# Patient Record
Sex: FEMALE | Race: BLACK OR AFRICAN AMERICAN | NOT HISPANIC OR LATINO | Employment: FULL TIME | URBAN - METROPOLITAN AREA
[De-identification: names, ages, dates, MRNs, and addresses within clinical notes are randomized per-mention and may not be internally consistent; named-entity substitution may affect disease eponyms.]

---

## 2017-01-03 ENCOUNTER — OFFICE VISIT (OUTPATIENT)
Dept: INTERNAL MEDICINE | Facility: CLINIC | Age: 52
End: 2017-01-03
Attending: FAMILY MEDICINE
Payer: COMMERCIAL

## 2017-01-03 ENCOUNTER — TELEPHONE (OUTPATIENT)
Dept: SMOKING CESSATION | Facility: CLINIC | Age: 52
End: 2017-01-03

## 2017-01-03 VITALS
OXYGEN SATURATION: 98 % | BODY MASS INDEX: 31.89 KG/M2 | SYSTOLIC BLOOD PRESSURE: 130 MMHG | DIASTOLIC BLOOD PRESSURE: 87 MMHG | HEART RATE: 67 BPM | HEIGHT: 66 IN | WEIGHT: 198.44 LBS

## 2017-01-03 DIAGNOSIS — M54.50 CHRONIC BILATERAL LOW BACK PAIN WITHOUT SCIATICA: ICD-10-CM

## 2017-01-03 DIAGNOSIS — M54.2 NECK PAIN: Primary | ICD-10-CM

## 2017-01-03 DIAGNOSIS — Z12.11 SCREEN FOR COLON CANCER: ICD-10-CM

## 2017-01-03 DIAGNOSIS — M77.10 LATERAL EPICONDYLITIS  OF ELBOW: ICD-10-CM

## 2017-01-03 DIAGNOSIS — G89.29 CHRONIC BILATERAL LOW BACK PAIN WITHOUT SCIATICA: ICD-10-CM

## 2017-01-03 DIAGNOSIS — M54.9 UPPER BACK PAIN: ICD-10-CM

## 2017-01-03 PROCEDURE — 3075F SYST BP GE 130 - 139MM HG: CPT | Mod: S$GLB,,, | Performed by: FAMILY MEDICINE

## 2017-01-03 PROCEDURE — 3079F DIAST BP 80-89 MM HG: CPT | Mod: S$GLB,,, | Performed by: FAMILY MEDICINE

## 2017-01-03 PROCEDURE — 99213 OFFICE O/P EST LOW 20 MIN: CPT | Mod: S$GLB,,, | Performed by: FAMILY MEDICINE

## 2017-01-03 PROCEDURE — 1159F MED LIST DOCD IN RCRD: CPT | Mod: S$GLB,,, | Performed by: FAMILY MEDICINE

## 2017-01-03 PROCEDURE — 99999 PR PBB SHADOW E&M-EST. PATIENT-LVL V: CPT | Mod: PBBFAC,,, | Performed by: FAMILY MEDICINE

## 2017-01-03 RX ORDER — TIZANIDINE 2 MG/1
2-4 TABLET ORAL EVERY 8 HOURS PRN
Qty: 30 TABLET | Refills: 1 | Status: SHIPPED | OUTPATIENT
Start: 2017-01-03 | End: 2017-01-13

## 2017-01-03 RX ORDER — MELOXICAM 15 MG/1
15 TABLET ORAL DAILY PRN
Qty: 30 TABLET | Refills: 1 | Status: SHIPPED | OUTPATIENT
Start: 2017-01-03 | End: 2018-08-23 | Stop reason: ALTCHOICE

## 2017-01-03 NOTE — MR AVS SNAPSHOT
[facility]  [address]  [facility] [address]  Phone: [phone]  Fax: [phone]                  Radha Valle   1/3/2017 7:40 AM   Office Visit    Description:  Female : 1965   Provider:  Melba Caro MD   Department:  [facility]           Reason for Visit     Back Pain     Generalized Body Aches           Diagnoses this Visit        Comments    Neck pain    -  Primary     Upper back pain         Chronic bilateral low back pain without sciatica         Lateral epicondylitis  of elbow         Screen for colon cancer                To Do List           Future Appointments        Provider Department Dept Phone    2017 7:00 AM LAB, [facility] [phone]      Goals (5 Years of Data)     None       These Medications        Disp Refills Start End    meloxicam (MOBIC) 15 MG tablet 30 tablet 1 1/3/2017     Take 1 tablet (15 mg total) by mouth daily as needed for Pain. - Oral    Pharmacy: [facility] [address] [place] [address] [id number] [facility] Ph #: [phone]       tizanidine (ZANAFLEX) 2 MG tablet 30 tablet 1 1/3/2017 2017    Take 1-2 tablets (2-4 mg total) by mouth every 8 (eight) hours as needed. - Oral    Pharmacy: [facility] [address] [place] [address] [address] [facility] Ph #: [phone]       arm brace [facility] 1 each 0 1/3/2017     1 application by Misc.(Non-Drug; Combo Route) route daily as needed. Lateral epicondylitis - Misc.(Non-Drug; Combo Route)    Pharmacy: [facility] [address] [place] [address] [address] [facility] Ph #: [phone]         [facility]     [facility] -  Assistance  Registered nurses in the [facility] provide clinical advisement, health education, appointment booking, and other advisory services.  Call for this free service at [phone].             Medications           Message regarding  Medications     Verify the changes and/or additions to your medication regime listed below are the same as discussed with your clinician today.  If any of these changes or additions are incorrect, please notify your healthcare provider.        START taking these NEW medications        Refills    meloxicam (MOBIC) 15 MG tablet 1    Sig: Take 1 tablet (15 mg total) by mouth daily as needed for Pain.    Class: Normal    Route: Oral    tizanidine (ZANAFLEX) 2 MG tablet 1    Sig: Take 1-2 tablets (2-4 mg total) by mouth every 8 (eight) hours as needed.    Class: Normal    Route: Oral    arm brace Misc 0    Si application by Misc.(Non-Drug; Combo Route) route daily as needed. Lateral epicondylitis    Class: Print    Route: Misc.(Non-Drug; Combo Route)      STOP taking these medications     naproxen (EC NAPROSYN) 500 MG EC tablet Take 1 tablet (500 mg total) by mouth 2 (two) times daily.           Verify that the below list of medications is an accurate representation of the medications you are currently taking.  If none reported, the list may be blank. If incorrect, please contact your healthcare provider. Carry this list with you in case of emergency.           Current Medications     aspirin 81 MG Chew Take 81 mg by mouth once daily.    arm brace Misc 1 application by Misc.(Non-Drug; Combo Route) route daily as needed. Lateral epicondylitis    meloxicam (MOBIC) 15 MG tablet Take 1 tablet (15 mg total) by mouth daily as needed for Pain.    nicotine (NICODERM CQ) 7 mg/24 hr Place 1 patch onto the skin once daily.    tizanidine (ZANAFLEX) 2 MG tablet Take 1-2 tablets (2-4 mg total) by mouth every 8 (eight) hours as needed.    varenicline (CHANTIX) 1 mg Tab Take 1 tablet (1 mg total) by mouth 2 (two) times daily.           Clinical Reference Information           Vital Signs - Last Recorded  Most recent update: 1/3/2017  8:00 AM by Milagros Mitchell MA    BP Pulse Ht Wt SpO2 BMI    130/87 (BP Location: Left arm, Patient  "Position: Standing, BP Method: Automatic) 67 5' 6" (1.676 m) 90 kg (198 lb 6.6 oz) 98% 32.02 kg/m2      Blood Pressure          Most Recent Value    BP  130/87      Allergies as of 1/3/2017     No Known Allergies      Immunizations Administered on Date of Encounter - 1/3/2017     None      Orders Placed During Today's Visit      Normal Orders This Visit    Ambulatory Consult to [facility]     Ambulatory consult to [facility]     POCT HEMOCULT STOOL X3       Instructions      Treating Tennis Elbow    Your treatment will depend on how inflamed your tendon is. The goal is to relieve your symptoms and help you regain full use of your elbow.  Rest and medicine  Wearing a tennis elbow splint allows the inflamed tendon to rest. It must be worn properly. It should be placed down the arm past the painful area of the elbow. If it is directly over the inflamed tendon, it can worsen the symptoms. This brace can help the tendon heal. Using your other hand or changing your  also takes stress off the tendon. Oral nonsteroidal anti-inflammatory medicines (NSAIDs) and/or ice can relieve pain and reduce swelling.  Exercises and therapy  Your healthcare provider may give you an exercise program. He or she may refer you to a therapist. The therapist will teach you to gently stretch and then strengthen the muscles around your elbow.  Anti-inflammatory injections  Your healthcare provider may give you injections of an anti-inflammatory, such as cortisone. This helps reduce swelling. You may have more pain at first. But in a few days, your elbow should feel better.  If surgery is needed  If your symptoms persist for a long time, or other treatments dont work, your healthcare provider may recommend surgery. Surgery repairs the inflamed tendon.   © 8917-8835 [employer]. [address] [place] [address], [place] [address]. All rights reserved. This information is not intended as a substitute for professional " medical care. Always follow your healthcare professional's instructions.

## 2017-01-03 NOTE — TELEPHONE ENCOUNTER
Successful contact at [phone]. Following up with patient to check how she is doing using Chantix. Patient states she stop taking due to feeling bad. Asked patient when did she stop taking Chantix replied about two weeks. Also reported she started smoking again and does not know why because she wants to stop but she continue to smoke. Discussed with patient using strategies learned and also changing NRT to help her with smoking and offered an appointment to meet in office to get her back on track. Patient confirmed appointment on 1/4 at 4 pm.

## 2017-01-03 NOTE — PROGRESS NOTES
"Subjective:      Patient ID: Radha Valle is a 51 y.o. female.    Chief Complaint: Back Pain; Neck Pain; and Elbow Pain    HPI Comments: One month of neck pain, upper back pain and lower back pain. The pain feels like a burning sensation. The pain lasts all day everyday. The pain does not radiate. She denies any triggers prior to the pain. She denies falls or trauma. She does lift 40-50 pounds daily. She does this in a standing position. She has been taking flexeril and everyday. This is not helping with her pain.     Review of Systems   Constitutional: Negative.    Gastrointestinal: Negative.    Genitourinary: Negative for dysuria.     I personally reviewed Past Medical History, Past Surgical history,  Past Social History and Family History    Objective:     Visit Vitals    /87 (BP Location: Left arm, Patient Position: Standing, BP Method: Automatic)    Pulse 67    Ht 5' 6" (1.676 m)    Wt 90 kg (198 lb 6.6 oz)    SpO2 98%    BMI 32.02 kg/m2       Physical Exam   Constitutional: She appears well-developed and well-nourished. No distress.   HENT:   Head: Normocephalic and atraumatic.   Right Ear: External ear normal.   Left Ear: External ear normal.   Eyes: Conjunctivae are normal.   Neck: Normal range of motion. Neck supple.   Cardiovascular: Normal rate, regular rhythm, normal heart sounds and intact distal pulses.    Pulmonary/Chest: Effort normal and breath sounds normal.   Abdominal: Soft. Bowel sounds are normal.   Musculoskeletal:        Cervical back: She exhibits tenderness. She exhibits normal range of motion and no bony tenderness.        Lumbar back: She exhibits tenderness. She exhibits normal range of motion and no bony tenderness.   Positive maudsley's test    Skin: She is not diaphoretic.       Radha was seen today for back pain, neck pain and elbow pain.    Diagnoses and all orders for this visit:    Neck pain  Upper back pain  Chronic bilateral low back pain without " sciatica  -handout of exercises given to patient, she will call if no improvement in 2 weeks, consider imaging at that time   -     Ambulatory consult to [facility]  -     Ambulatory Consult to [facility]      Lateral epicondylitis  of elbow  - handout of exercises given to patient, she will call if no improvement in 2 weeks   -     arm brace Misc; 1 application by Misc.(Non-Drug; Combo Route) route daily as needed. Lateral epicondylitis    Screen for colon cancer  -completed     Other orders  -     Cancel: Ambulatory referral to Gastroenterology  -     meloxicam (MOBIC) 15 MG tablet; Take 1 tablet (15 mg total) by mouth daily as needed for Pain.  -     tizanidine (ZANAFLEX) 2 MG tablet; Take 1-2 tablets (2-4 mg total) by mouth every 8 (eight) hours as needed.

## 2017-01-03 NOTE — PATIENT INSTRUCTIONS
Treating Tennis Elbow    Your treatment will depend on how inflamed your tendon is. The goal is to relieve your symptoms and help you regain full use of your elbow.  Rest and medicine  Wearing a tennis elbow splint allows the inflamed tendon to rest. It must be worn properly. It should be placed down the arm past the painful area of the elbow. If it is directly over the inflamed tendon, it can worsen the symptoms. This brace can help the tendon heal. Using your other hand or changing your  also takes stress off the tendon. Oral nonsteroidal anti-inflammatory medicines (NSAIDs) and/or ice can relieve pain and reduce swelling.  Exercises and therapy  Your healthcare provider may give you an exercise program. He or she may refer you to a therapist. The therapist will teach you to gently stretch and then strengthen the muscles around your elbow.  Anti-inflammatory injections  Your healthcare provider may give you injections of an anti-inflammatory, such as cortisone. This helps reduce swelling. You may have more pain at first. But in a few days, your elbow should feel better.  If surgery is needed  If your symptoms persist for a long time, or other treatments dont work, your healthcare provider may recommend surgery. Surgery repairs the inflamed tendon.   © 7602-2876 [employer]. [address] [place] [address], [place] [address]. All rights reserved. This information is not intended as a substitute for professional medical care. Always follow your healthcare professional's instructions.

## 2017-01-25 ENCOUNTER — CLINICAL SUPPORT (OUTPATIENT)
Dept: REHABILITATION | Facility: OTHER | Age: 52
End: 2017-01-25
Attending: PHYSICAL MEDICINE & REHABILITATION
Payer: COMMERCIAL

## 2017-01-25 ENCOUNTER — CLINICAL SUPPORT (OUTPATIENT)
Dept: REHABILITATION | Facility: OTHER | Age: 52
End: 2017-01-25
Attending: FAMILY MEDICINE
Payer: COMMERCIAL

## 2017-01-25 VITALS
DIASTOLIC BLOOD PRESSURE: 104 MMHG | BODY MASS INDEX: 31.66 KG/M2 | SYSTOLIC BLOOD PRESSURE: 154 MMHG | HEIGHT: 66 IN | WEIGHT: 197 LBS | HEART RATE: 72 BPM

## 2017-01-25 DIAGNOSIS — M54.42 CHRONIC LEFT-SIDED LOW BACK PAIN WITH LEFT-SIDED SCIATICA: Primary | ICD-10-CM

## 2017-01-25 DIAGNOSIS — G89.29 CHRONIC LEFT-SIDED LOW BACK PAIN WITH LEFT-SIDED SCIATICA: Primary | ICD-10-CM

## 2017-01-25 PROCEDURE — 99204 OFFICE O/P NEW MOD 45 MIN: CPT | Mod: ,,, | Performed by: PHYSICAL MEDICINE & REHABILITATION

## 2017-01-25 NOTE — PROGRESS NOTES
Subjective:      Patient ID: Radha Valle is a 51 y.o. female.    Chief Complaint: Low-back Pain    Referred by: Melba Caro MD     HPI Comments: Ms Valle is a 52 yo female here  for evaluation for the healthy back lumbar program.  she has had low back pain for 2 years, and it is not changing.  The pain is left low back dominant, and with some left leg pain to the calf which comes and goes and is with activity.  She does have neck pain as well.  The pain is constant, from 4-7/10.  It is worse with bending, sitting, lifting, and lying on her back.  She feels best when she stands or lies on her side. She has not had any treatment for her back.  Her goals are to bend, sit, and lift.  She feels like the back pain is interfering with her work.  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  She has to take breaks.  She has HTN, and she has not taken her meds.  There is no numbness and no tingling. Pattern 1    Past Medical History:    Bulging lumbar disc                                           Bulging of cervical intervertebral disc                       Hyperlipidemia                                                Hypertension                                                  Obese                                                         Prediabetes                                                   Vitamin D deficiency                                          Past Surgical History:    TUBAL LIGATION                                                 WISDOM TOOTH EXTRACTION                                        Review of patient's family history indicates:    Diabetes                       Mother                    Hyperlipidemia                 Mother                    Hypertension                   Mother                    COPD                           Father                    Diabetes                       Sister                    Alcohol abuse                  Brother                    Cancer                         Brother                     Comment: liver    Diabetes                       Sister                    Diabetes                       Sister                    Hypertension                   Brother                   Hypertension                   Brother                   Stroke                         Brother                   Breast cancer                  Cousin                      Social History    Marital status:             Spouse name:                       Years of education:                 Number of children:               Occupational History  Occupation          Employer            Comment               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*                         Social History Main Topics    Smoking status: Former Smoker                                                                Packs/day: 0.50      Years: 30.00          Types: Cigarettes       Quit date: 2016    Smokeless status: Never Used                        Alcohol use: Yes           0.0 oz/week       0 Standard drinks or equivalent per week       Comment: social    Drug use: No              Sexual activity: Yes               Partners with: Male       Birth control/protection: Surgical       Comment: Tubal ligation    Social History Narrative    Pt's son lives with her.  He has autism and is 20 yrs old.  Another son also had autism and is .          Current Outpatient Prescriptions:  arm brace Misc, 1 application by Misc.(Non-Drug; Combo Route) route daily as needed. Lateral epicondylitis, Disp: 1 each, Rfl: 0  aspirin 81 MG Chew, Take 81 mg by mouth once daily., Disp: , Rfl:   meloxicam (MOBIC) 15 MG tablet, Take 1 tablet (15 mg total) by mouth daily as needed for Pain., Disp: 30 tablet, Rfl: 1  nicotine (NICODERM CQ) 7 mg/24 hr, Place 1 patch onto the skin once daily., Disp: 14 patch, Rfl: 0  varenicline (CHANTIX) 1 mg Tab, Take 1 tablet (1 mg total) by mouth 2 (two) times daily., Disp: 60  tablet, Rfl: 0    No current facility-administered medications for this visit.       Review of patient's allergies indicates:  No Known Allergies          Review of Systems   Constitution: Negative for weight gain and weight loss.   Cardiovascular: Negative for chest pain.   Respiratory: Negative for shortness of breath.    Musculoskeletal: Positive for back pain. Negative for joint pain and joint swelling.   Gastrointestinal: Negative for abdominal pain and bowel incontinence.   Genitourinary: Negative for bladder incontinence.   Neurological: Negative for numbness.           Objective:          General    Vitals reviewed.  Constitutional: She is oriented to person, place, and time. She appears well-developed and well-nourished.   HENT:   Head: Normocephalic and atraumatic.   Pulmonary/Chest: Effort normal.   Neurological: She is alert and oriented to person, place, and time.   Psychiatric: She has a normal mood and affect. Her behavior is normal. Judgment and thought content normal.     General Musculoskeletal Exam   Gait: normal     Right Ankle/Foot Exam     Tests   Heel Walk: able to perform  Tiptoe Walk: able to perform    Left Ankle/Foot Exam     Tests   Heel Walk: able to perform  Tiptoe Walk: able to perform  Back (L-Spine & T-Spine) / Neck (C-Spine) Exam     Tenderness Left paramedian tenderness of the Sacrum and Lower L-Spine.     Back (L-Spine & T-Spine) Range of Motion   Extension: 20   Flexion: 80   Lateral Bend Right: 10   Lateral Bend Left: 10   Rotation Right: 30   Rotation Left: 30     Spinal Sensation   Right Side Sensation  C-Spine Level: normal   L-Spine Level: normal  S-Spine Level: normal  Left Side Sensation  C-Spine Level: normal  L-Spine Level: normal  S-Spine Level: normal    Back (L-Spine & T-Spine) Tests   Right Side Tests  Straight leg raise:      Sitting SLR: > 70 degrees      Left Side Tests  Straight leg raise:     Sitting SLR: > 70 degrees          Other She has no scoliosis  .  Spinal Kyphosis:  Absent    Comments:  Neg MICA bilaterally      Muscle Strength   Right Upper Extremity   Biceps: 5/5/5   Deltoid:  5/5  Triceps:  5/5  Wrist Extension: 5/5/5   Finger Flexors:  5/5  Left Upper Extremity  Biceps: 5/5/5   Deltoid:  5/5  Triceps:  5/5  Wrist Extension: 5/5/5   Finger Flexors:  5/5  Right Lower Extremity   Hip Flexion: 5/5   Quadriceps:  5/5   Anterior tibial:  5/5/5  EHL:  5/5  Left Lower Extremity   Hip Flexion: 5/5   Quadriceps:  5/5   Anterior tibial:  5/5/5   EHL:  5/5    Reflexes     Left Side  Biceps:  2+  Triceps:  2+  Brachioradialis:  2+  Quadriceps:  2+  Achilles:  2+  Left Rios's Sign:  Absent  Babinski Sign:  absent    Right Side   Biceps:  2+  Triceps:  2+  Brachioradialis:  2+  Quadriceps:  2+  Achilles:  2+  Right Rios's Sign:  absent  Babinski Sign:  absent    Vascular Exam     Right Pulses        Carotid:                  2+    Left Pulses        Carotid:                  2+        Assessment:       Encounter Diagnosis   Name Primary?    Chronic left-sided low back pain with left-sided sciatica Yes         Plan:       Radha was seen today for low-back pain.    Diagnoses and all orders for this visit:    Chronic left-sided low back pain with left-sided sciatica  -     Ambulatory Referral to Physical/Occupational Therapy         The patient has had a history of low back pain with limitations in there activities of Daily living    Previous treatment has not provided relief.    The situation was discussed at length with the patient.  More than 50% of the total time of 45 minutes was spent in counseling.  We discussed different causes of back pain and different treatment options.  We discussed the importance of stretching and strengthening.  We discussed posture.  We discussed the pros and cons of further diagnostic testing, alternative treatment and Medications    Based on the history, physical exam, and functional index, an active physical therapy program  is recommended.  The goal is to restore the patients function and reduce pain.  A program of progressive resistance exercises, biomechanical, and mobility maneuvers, instructions in proper body mechanics, aerobic conditioning and HEP will be utilized. The program will continue as long as making improvements.    An assessment of patients progress will be made at each visit to document change in status.    The patient will be actively involved in there own treatment, and responsible for appointments and home program    The patient's lumbar isometric strength will be tested and they will be placed in a program of isolated strength training based on 50% of their total functional torque and advanced as clinically appropriate.      Directional preference of pain will further influence the patients active rehabilitation program    The patient was instructed there might be an initial increase in discomfort    They are enrolled with good prognosis  Pattern 1  She does have some neck pain and might be interested in adding the neck.

## 2017-01-25 NOTE — PROGRESS NOTES
Health  met with patient to complete initial outcomes for the Healthy Back lumbar program.  She is also having neck pain, but would like to focus on low back first. Questions were reviewed with patient and discussed with Dr. Terry. The patient will meet with Dr. Terry to determine program enrollment.   HealthyBack Questionnaire  1/25/2017   Please select the location of your worst pain:  Low back   Please select the location of any additional pain: (hold down the control key, and click to select multiple responses) Neck, Leg- Right   Did any event trigger your pain?  No   How long has this pain been going on?  2 years   Please indicate how the pain is changing.  No Change   What is the WORST level of pain that you have experienced in the last week?  7   What is the LEAST level of pain that you have experienced in the past week?  5   What can you NOT do not that you used to be able to do?  Working without sitting down, exercising   Are your limitations mainly due to your pain?  Yes   What are your additional complaints, if any? Burning   Is there ever a time during the day when your pain stops, even for a brief moment, before returning? No   If yes, when your pain stops, does it disappear completely? Is it totally gone? No   Does bending forward make your typical pain worse? Yes   Morning: Same   Afternoon: Same   Evening:  Same   Nighttime: Same   Standing:  Better   Lying on stomach: Worse   Lying on back: Worse   Sitting:  Worse   Walking: Same   Climbing stairs: Same   In the last seven days, have you had any changes in your bowel and/or bladder habits? No   Have all of your attempts to treat your back/leg pain resulted in failure?  Yes   Do you believe your doctor(s) do NOT understand how much pain you have?  No   Do you believe that you have one or more conditions that have not been diagnosed by your doctor(s)?  No   Do you believe that you deserve more understanding from others including your family  than you are getting?  No   Do you feel relatively calm about how your back/leg pain has impacted your life?  No   Are you OK with treatment taking a very long time (even years) before you feel relief from your back/leg pain?  Yes   Do you believe that your pain has caused you to be more forgetful?  No   Do you feel that you have not received enough treatment for your pain?  Yes   Do you believe that your doctor(s) do not have the right training to treat your back/leg pain effectively?  No   Do you believe you should not be allowed to work or attend school because of your back/leg pain?  No   When did this pain begin?  2 years   Did the pain begin after an injury or an accident? No   Is the pain work related?  No   Please agustin which of the following over-the-counter medicines you take. (hold down the control key, and click to select multiple responses) Ibuprofen   Please agustin which of the following prescription medicines you take. (hold down the control key, and click to select multiple responses) Muscle relaxer   Emergency department  No   Health care providers (hold down the control key, and click to select multiple responses) Family doctor   Investigations done (hold down the control key, and click to select multiple responses) None   Procedures (hold down the control key, and click to select multiple responses) None   Emergency department  No   Health care providers (hold down the control key, and click to select multiple responses) Family doctor   Investigations done (hold down the control key, and click to select multiple responses) None   Procedures (hold down the control key, and click to select multiple responses) None   Have you had any surgery on your back?  No   Please agustin what you are experiencing. (hold down the control key, and click to select multiple responses) Problems with bowel functions, Palpitations   First activity you would like to perform better:  Bending   Current ability score to perform  first activity: 6   Second activity you would like to perform better: Lifting   Current ability score to perform second activity: 6   Third activity you would like to perform better: Sitting   Current ability score to perform third activity: 5   Pain intensity:  The pain comes and goes and is very severe.   Personal care (washing, dressing, etc.):  I would not have to change my way of washing or dressing in order to avoid pain.   Lifting: I can lift heavy weights, but it causes pain.   Walking: I have some pain walking, but it does not increase with distance.   Sitting: Pain prevents me from sitting more than one hour.   Standing: I have some pain while standing, but it does not increase with time.   Sleeping: I get pain in bed, but it does not prevent me from sleeping well.   Social life: My social life is normal but increases the degree of pain.   Traveling: I get some pain while traveling, but none of my usual forms of travel make it any worse.   Changing degree of pain: My pain is neither getting better nor worse.   Do you need any help looking after yourself? I need no help at all.   When doing household tasks, e.g., preparing food, gardening, using the video recorder, radio, telephone, or washing the car: Occasionally I need some help with household tasks.   Thinking about how easily you can get around your home and community: I get around my home and community by myself without any difficulty.   Because of your health, your relationships, e.g., your friends, partner or parents, generally: Are very close and warm   Thinking about your relationships with other people: I have plenty of friends and am never lonely.   Thinking about your health and my relationship with my family:  My role in the family is unaffected by my health.   Thinking about your vision, including when using your glasses or contact lenses if needed: I see normally.   Thinking about your hearing, including using your hearing aid if needed: I  hear normally.   When you communicate with others, e.g., talking, listening, writing, or signing: I have no trouble speaking to them or understanding what they are saying.   Thinking about how you sleep: I am able to sleep without difficulty most of the time.   Thinking about how you generally feel: I do not feel anxious, worried or depressed.   How much pain or discomfort do you experience? I suffer from severe pain.   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 What are your hobbies? out with friends/family   How do you spend your leisure time? What are your hobbies? out with friends/family   What is your highest level of education? High school/GED   What is your work status? Employed   How did you hear about the Healthyback program?  Physician   When did this pain begin?  2 years

## 2017-02-15 ENCOUNTER — CLINICAL SUPPORT (OUTPATIENT)
Dept: REHABILITATION | Facility: OTHER | Age: 52
End: 2017-02-15
Attending: PHYSICAL MEDICINE & REHABILITATION
Payer: COMMERCIAL

## 2017-02-15 DIAGNOSIS — M51.36 DDD (DEGENERATIVE DISC DISEASE), LUMBAR: Primary | ICD-10-CM

## 2017-02-15 PROCEDURE — 97110 THERAPEUTIC EXERCISES: CPT | Performed by: PHYSICAL MEDICINE & REHABILITATION

## 2017-02-15 PROCEDURE — 97162 PT EVAL MOD COMPLEX 30 MIN: CPT | Performed by: PHYSICAL MEDICINE & REHABILITATION

## 2017-02-15 NOTE — PROGRESS NOTES
[facility]   LUMBAR SPINE EVALUATION    Date:  2/15/17, 12:30-2:00 pm    Precautions:  Neck pain    Pattern:  1, PEN  Movement responder -  Flexion in lie and z lie started at eval          Eval date:  2/15/17  Reassessment due:  3/17/17    POC Requested....2/15/17  Next POC due...        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 She has had low back pain for 2 years, it is always there, never a day without pain, but it changes in intensity with periods of worse pain.   Over time she feels it is not over all  changing. The pain is left low back dominant, less on the right.   Intermittent but daily back pain, back pain dominant.   Intermittent left  leg sharp pain  From buttock to the back of  the calf which comes.   She is worse with housework, activities, and lifting at work.   The pain is intermittent from 0-7/10. It is worse with bending, sitting, lifting, and lying on her back. She feels best when she stands or lies on her side. She has not had any treatment for her back. Her goals are to bend, sit, and lift. She feels like the back pain is interfering with her work. 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 She has to take breaks. She has HTN, and she has not taken her meds. There is no numbness and no tingling. Pattern 1        Worse: Bending   Sitting > 20 min   Rising   Standing > 1 hour   Moderate in the morning, worse as the day progresses if she is active    Better:  better if not active   Rubbing her back      Disturbed sleep: sleeping ok      Previous treatments:  nil      SPECIFIC QUESTIONS  Cough  Sneeze  Strain neg  Imaging: no  Night pain:  neg  Accidents: neg  Unexplained weight loss:neg  Bowel/bladder: neg    Pattern of pain questions:  1.  Where is your pain the worst?  back  2.  Is your pain constant or intermittent?  intermittent  3. Does bending forward make your typical pain worse?  yes  4. Since the start of your back pain, has there been a change in your bowel or bladder?  no  5.   Patient Goals: reduce pain, be more active          Objective       No environmental, cultural, spiritual, developmental or education needs expressed or noted    POSTURE  Sitting: poor  Standing: fair  Lordosis: fair  Lateral shift: not noted    Correction of posture: improved with slim line  Relevant: yes    Other observations:  ROM hips and knees WFL  Walks on toes, walks on heels  Neg aberrant motions with low back movements  Prone instability test neg  Spring test  normal           NEUROLOGICAL  Motor deficit:  -hip flexion (L2)  WFL and equal bilat  -knee extension (L3-4)WFL and equal bilat  -dorsiflexion seated (L4)WFL and equal bilat  -EHL (L5)  WFL and equal bilat  -walk on toes (S1)  WFL and equal bilat  -walk on heels (L4)WFL and equal bilat  -SLS (trendelenberg L4)WFL and equal bilat      Sensory deficit: intact  Reflexes: equal bilat  SLR:neg  Femoral nerve stretch test: neg  Saddle Sensation:  intact  Upper motor test:  Neg clonus     MOVEMENT LOSS : back  Flexion:     Mod loss, fingers to knees, poor curve reversal  Extension:      Mod loss, 14 degrees  Side gliding RIGHT:       No loss  Side gliding LEFT:      No loss    TEST MOVEMENTS:   Pre test pain level:  5/10 back  Repeated flexion in standing:   Slowly reduced back pain but minimal improvement  Repeated extension in standing:   No change  Repeated flexion in lying:  Reduced with ball  Repeated extensions prone:    "Increased and worse back  Z lie better      Pt performed baseline isometric testing using the Med X equipment.  The test was conducted by the physical therapist.    Health[facility]Back Therapy 2/15/2017   Visit Number 1   VAS Pain Rating 5   Treadmill Time (in min.) 10   Speed 2   Flexion in Lying 10   Lumbar Extension Seat Pad 0   Femur Restraint 5   Top Dead Center 24   Counterweight 236   Lumbar Flexion 48   Lumbar Extension 0   Lumbar Peak Torque 123   Ice - Z Lie (in min.) 10         Baseline IM Testing Results:  ROM:  0-48  Max Peak Torque:  123 ft lbs  Min Peak Torque:   50 ft lbs  Flex/ext ratio:   2.2/1  % below normative data:   49% below normative data          Treatment       Patient received a handout regarding anticipated muscular soreness following the isometric test and strategies for management were reviewed with patient including using ice and rest.     Patient received education on the healthy back program, purpose of the isometric test, progression of back strengthening program, as well as wellness approach and general body strengthening.  Details of the    program were discussed as well.  Discussed that patient should feel support/pressure from med ex supports but no discomfort and patient expressed understanding.  Patient informed they should tell us of any joint pain or symptoms when exercising, and expressed understanding.    HEP started as follows:  -handouts given regarding back care, with information regarding posture, body mech, ergonomics, and components of good exercise program  -Patient received education regarding proper posture and body mechanics.  Gold ahumada tried, recommended, and purchase information was provided.  -discussed concept of developing "tool box" or "strategies, using positions or exercises to reduce symptoms.  Discussed using these tools to reduce symptoms, and also to prevent symptoms if able.         -Started HEP of   -gave top 10 tips handouts on back and neck care and " discusses sitting posture, use of lumbar roll, standing  Posture, correct lifting techniques, need to exercise and encouraged walking, drinking water, healthy diet, regular sleep  -she has [employer] membership, encouraged walking on treadmill 2/week 10 min - revisit walking program in future  -flexion in lie 3/day 10 reps, she plans to buy ball and info given  -z lie with ball or chair 10 min     ..... And encouraged patient to note effect           (patient has handouts and demonstrated and expressed understanding of)        Assessment     PT diagnosis: dysfunction      Pattern:  1, PEN  Movement responder -  Flexion in lie and z lie started at eval              Medical necessity is demonstrated by the following problem list.    Poor posture and body mech  Poor strength on med ex isometric stress test  Reduced ROM on med ex lumbar isometric test  Low back pain    History  Co-morbidities and personal factors that may impact the plan of care Examination  Body Structures and Functions, activity limitations and participation restrictions that may impact the plan of care Clinical Presentation   Decision Making/ Complexity Score   Co-morbidities:   Neck pain    Poor compliance with exercise    Sedentary                Body Regions:back, LE    Body Systems: musculoskeletal and neuromuscular    -Reduced lumbar ROM actively and with function  -reduced back strength  -poor curve reversal with back ROM  -poor lumbar motor control  -med ex strength 49% below normative data        Activity limitations:   Reduced ability to stand/walk/sit        Participation Restrictions:  Doesn't go out as she use to with friends               Evolving clinical presentation with changing clinical characteristics               Moderate               Based on the above history, physical examination, and baseline IM testing, an active physical therapy program is recommended.    Prognosis is: good    GOALS: Pt is in agreement with the  following goals.      Short term goals: 5 weeks or 10 visits  1.  Pt will demonstrate increased lumbar ROM as measured by med ex by at least 3 degrees from the initial ROM value with improvements noted in functional ROM and ability to perform ADL  2.  Pt will demonstrate increased maximum isometric torque value by 5% when compared to the initial value resulting in improved ability to function, stand, walk, lift items.  3.  Pt will tolerate regular 5% increases in dynamic weight loads on all machines  4.  Patient report a reduction in worst pain score by 1-2 points for improved tolerance during work and recreational activities  5.  Pt able to perform HEP correctly with minimal cueing or supervision for therapist  6. Pt will demonstrate improvement in flexion/extension strength ratio compared in initial value    Long term goals: 10 weeks or 20 visits  1. Pt will demonstrate increased lumbar ROM by at least 6 degrees from initial ROM value, resulting in improved ability to perform functional fwd bending while standing and sitting.    2. Pt will demonstrate increased maximum isometric torque value by 10% when compared to the initial value, resulting in improved ability to perform bending, lifting, and carrying activities safely, confidently, and 2/10 pain or less.   3. Pt will be able to ambulate community distances safely, confidently, and 2/10 pain or less.  4. Pt to demonstrate ability to independently control and reduce their pain through posture positioning and mechanical movements throughout typical work day.  5. Pt able to sleep through the night without waking with c/o pain.   6. Pt able to perform household cooking/cleaning ADLS safely, confidently, and 2/10 pain or less.  7. Pt to be able to perform self care and grooming ADLs safely, confidently, independently, and 2/10 pain or less.   8. Pt able to resume their preferred exercise regimen safely, confidently, and 2/10 pain or less.    9. Pt will be able to  "ascend/descend 1 flight of stairs reciprocally with use of unilateral handrail for safety, confidently and 2/10 pain or less.      Additional Specific patient expressed goals:  1.  Reduce pain, intermittent and less intense, go a day without pain  2.  Be more active, go to gym  3. Less pain at work        PLAN   Outpatient physical therapy 2x/weekly for 13 weeks or 20 visits to include:   -a program of progressive, resisted exercises to strengthen spine musculature based on the pt's initial IM maximum torque value  -biomechanical and mobility maneuvers   -instruction in proper posture and body mechanics   -aerobic exercises  -home maintenance program  -strengthening of supporting musculature  -any other treatment deemed necessary for pt achieve established goals which may include: ice, joint mobilization, soft tissue mobilization, and modalities prn.      Patient may be seen by PTA as part of plan of care.  Face to face conferences will be held.        "I certify the need for these services furnished under this plan of treatment and while under my care."    ____________________________________  Physician/Referring Practitioner    _______________  Date of Signature      "

## 2017-02-15 NOTE — PLAN OF CARE
[facility]   LUMBAR SPINE EVALUATION    Date:  2/15/17, 12:30-2:00 pm    Precautions:  Neck pain    Pattern:  1, PEN  Movement responder -  Flexion in lie and z lie started at eval          Eval date:  2/15/17  Reassessment due:  3/17/17    POC Requested....2/15/17  Next POC due...        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 She has had low back pain for 2 years, it is always there, never a day without pain, but it changes in intensity with periods of worse pain.   Over time she feels it is not over all  changing. The pain is left low back dominant, less on the right.   Intermittent but daily back pain, back pain dominant.   Intermittent left  leg sharp pain  From buttock to the back of  the calf which comes.   She is worse with housework, activities, and lifting at work.   The pain is intermittent from 0-7/10. It is worse with bending, sitting, lifting, and lying on her back. She feels best when she stands or lies on her side. She has not had any treatment for her back. Her goals are to bend, sit, and lift. She feels like the back pain is interfering with her work. 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 She has to take breaks. She has HTN, and she has not taken her meds. There is no numbness and no tingling. Pattern 1        Worse: Bending   Sitting > 20 min   Rising   Standing > 1 hour   Moderate in the morning, worse as the day progresses if she is active    Better:  better if not active   Rubbing her back      Disturbed sleep: sleeping ok      Previous treatments:  nil      SPECIFIC QUESTIONS  Cough  Sneeze  Strain neg  Imaging: no  Night pain:  neg  Accidents: neg  Unexplained weight loss:neg  Bowel/bladder: neg    Pattern of pain questions:  1.  Where is your pain the worst?  back  2.  Is your pain constant or intermittent?  intermittent  3. Does bending forward make your typical pain worse?  yes  4. Since the start of your back pain, has there been a change in your bowel or bladder?  no  5.   Patient Goals: reduce pain, be more active          Objective       No environmental, cultural, spiritual, developmental or education needs expressed or noted    POSTURE  Sitting: poor  Standing: fair  Lordosis: fair  Lateral shift: not noted    Correction of posture: improved with slim line  Relevant: yes    Other observations:  ROM hips and knees WFL  Walks on toes, walks on heels  Neg aberrant motions with low back movements  Prone instability test neg  Spring test  normal           NEUROLOGICAL  Motor deficit:  -hip flexion (L2)  WFL and equal bilat  -knee extension (L3-4)WFL and equal bilat  -dorsiflexion seated (L4)WFL and equal bilat  -EHL (L5)  WFL and equal bilat  -walk on toes (S1)  WFL and equal bilat  -walk on heels (L4)WFL and equal bilat  -SLS (trendelenberg L4)WFL and equal bilat      Sensory deficit: intact  Reflexes: equal bilat  SLR:neg  Femoral nerve stretch test: neg  Saddle Sensation:  intact  Upper motor test:  Neg clonus     MOVEMENT LOSS : back  Flexion:     Mod loss, fingers to knees, poor curve reversal  Extension:      Mod loss, 14 degrees  Side gliding RIGHT:       No loss  Side gliding LEFT:      No loss    TEST MOVEMENTS:   Pre test pain level:  5/10 back  Repeated flexion in standing:   Slowly reduced back pain but minimal improvement  Repeated extension in standing:   No change  Repeated flexion in lying:  Reduced with ball  Repeated extensions prone:    "Increased and worse back  Z lie better      Pt performed baseline isometric testing using the Med X equipment.  The test was conducted by the physical therapist.    [facility] 2/15/2017   Visit Number 1   VAS Pain Rating 5   Treadmill Time (in min.) 10   Speed 2   Flexion in Lying 10   Lumbar Extension Seat Pad 0   Femur Restraint 5   Top Dead Center 24   Counterweight 236   Lumbar Flexion 48   Lumbar Extension 0   Lumbar Peak Torque 123   Ice - Z Lie (in min.) 10         Baseline IM Testing Results:  ROM:  0-48  Max Peak Torque:  123 ft lbs  Min Peak Torque:   50 ft lbs  Flex/ext ratio:   2.2/1  % below normative data:   49% below normative data          Treatment       Patient received a handout regarding anticipated muscular soreness following the isometric test and strategies for management were reviewed with patient including using ice and rest.     Patient received education on the healthy back program, purpose of the isometric test, progression of back strengthening program, as well as wellness approach and general body strengthening.  Details of the    program were discussed as well.  Discussed that patient should feel support/pressure from med ex supports but no discomfort and patient expressed understanding.  Patient informed they should tell us of any joint pain or symptoms when exercising, and expressed understanding.    HEP started as follows:  -handouts given regarding back care, with information regarding posture, body mech, ergonomics, and components of good exercise program  -Patient received education regarding proper posture and body mechanics.  Gold ahumada tried, recommended, and purchase information was provided.  -discussed concept of developing "tool box" or "strategies, using positions or exercises to reduce symptoms.  Discussed using these tools to reduce symptoms, and also to prevent symptoms if able.         -Started HEP of   -gave top 10 tips handouts on back and neck care and " discusses sitting posture, use of lumbar roll, standing  Posture, correct lifting techniques, need to exercise and encouraged walking, drinking water, healthy diet, regular sleep  -she has [employer] membership, encouraged walking on treadmill 2/week 10 min - revisit walking program in future  -flexion in lie 3/day 10 reps, she plans to buy ball and info given  -z lie with ball or chair 10 min     ..... And encouraged patient to note effect           (patient has handouts and demonstrated and expressed understanding of)        Assessment     PT diagnosis: dysfunction      Pattern:  1, PEN  Movement responder -  Flexion in lie and z lie started at eval              Medical necessity is demonstrated by the following problem list.    Poor posture and body mech  Poor strength on med ex isometric stress test  Reduced ROM on med ex lumbar isometric test  Low back pain    History  Co-morbidities and personal factors that may impact the plan of care Examination  Body Structures and Functions, activity limitations and participation restrictions that may impact the plan of care Clinical Presentation   Decision Making/ Complexity Score   Co-morbidities:   Neck pain    Poor compliance with exercise    Sedentary                Body Regions:back, LE    Body Systems: musculoskeletal and neuromuscular    -Reduced lumbar ROM actively and with function  -reduced back strength  -poor curve reversal with back ROM  -poor lumbar motor control  -med ex strength 49% below normative data        Activity limitations:   Reduced ability to stand/walk/sit        Participation Restrictions:  Doesn't go out as she use to with friends               Evolving clinical presentation with changing clinical characteristics               Moderate               Based on the above history, physical examination, and baseline IM testing, an active physical therapy program is recommended.    Prognosis is: good    GOALS: Pt is in agreement with the  following goals.      Short term goals: 5 weeks or 10 visits  1.  Pt will demonstrate increased lumbar ROM as measured by med ex by at least 3 degrees from the initial ROM value with improvements noted in functional ROM and ability to perform ADL  2.  Pt will demonstrate increased maximum isometric torque value by 5% when compared to the initial value resulting in improved ability to function, stand, walk, lift items.  3.  Pt will tolerate regular 5% increases in dynamic weight loads on all machines  4.  Patient report a reduction in worst pain score by 1-2 points for improved tolerance during work and recreational activities  5.  Pt able to perform HEP correctly with minimal cueing or supervision for therapist  6. Pt will demonstrate improvement in flexion/extension strength ratio compared in initial value    Long term goals: 10 weeks or 20 visits  1. Pt will demonstrate increased lumbar ROM by at least 6 degrees from initial ROM value, resulting in improved ability to perform functional fwd bending while standing and sitting.    2. Pt will demonstrate increased maximum isometric torque value by 10% when compared to the initial value, resulting in improved ability to perform bending, lifting, and carrying activities safely, confidently, and 2/10 pain or less.   3. Pt will be able to ambulate community distances safely, confidently, and 2/10 pain or less.  4. Pt to demonstrate ability to independently control and reduce their pain through posture positioning and mechanical movements throughout typical work day.  5. Pt able to sleep through the night without waking with c/o pain.   6. Pt able to perform household cooking/cleaning ADLS safely, confidently, and 2/10 pain or less.  7. Pt to be able to perform self care and grooming ADLs safely, confidently, independently, and 2/10 pain or less.   8. Pt able to resume their preferred exercise regimen safely, confidently, and 2/10 pain or less.    9. Pt will be able to  "ascend/descend 1 flight of stairs reciprocally with use of unilateral handrail for safety, confidently and 2/10 pain or less.      Additional Specific patient expressed goals:  1.  Reduce pain, intermittent and less intense, go a day without pain  2.  Be more active, go to gym  3. Less pain at work        PLAN   Outpatient physical therapy 2x/weekly for 13 weeks or 20 visits to include:   -a program of progressive, resisted exercises to strengthen spine musculature based on the pt's initial IM maximum torque value  -biomechanical and mobility maneuvers   -instruction in proper posture and body mechanics   -aerobic exercises  -home maintenance program  -strengthening of supporting musculature  -any other treatment deemed necessary for pt achieve established goals which may include: ice, joint mobilization, soft tissue mobilization, and modalities prn.      Patient may be seen by PTA as part of plan of care.  Face to face conferences will be held.        "I certify the need for these services furnished under this plan of treatment and while under my care."    ____________________________________  Physician/Referring Practitioner    _______________  Date of Signature        "

## 2017-02-24 ENCOUNTER — HOSPITAL ENCOUNTER (EMERGENCY)
Facility: OTHER | Age: 52
Discharge: HOME | End: 2017-02-25
Attending: EMERGENCY MEDICINE
Payer: COMMERCIAL

## 2017-02-24 DIAGNOSIS — R55 NEAR SYNCOPE: Primary | ICD-10-CM

## 2017-02-24 LAB — POCT GLUCOSE: 131 MG/DL (ref 70–110)

## 2017-02-24 PROCEDURE — 99284 EMERGENCY DEPT VISIT MOD MDM: CPT | Mod: 25

## 2017-02-24 PROCEDURE — 93005 ELECTROCARDIOGRAM TRACING: CPT

## 2017-02-24 PROCEDURE — 82962 GLUCOSE BLOOD TEST: CPT

## 2017-02-24 PROCEDURE — 96360 HYDRATION IV INFUSION INIT: CPT

## 2017-02-24 PROCEDURE — 93010 ELECTROCARDIOGRAM REPORT: CPT | Mod: ,,, | Performed by: INTERNAL MEDICINE

## 2017-02-24 RX ORDER — LISINOPRIL 10 MG/1
10 TABLET ORAL DAILY
COMMUNITY
End: 2017-08-01 | Stop reason: SDUPTHER

## 2017-02-24 RX ORDER — CHOLECALCIFEROL (VITAMIN D3) 25 MCG
185 TABLET ORAL DAILY
COMMUNITY
End: 2018-08-23 | Stop reason: ALTCHOICE

## 2017-02-24 NOTE — ED AVS SNAPSHOT
[facility]  [address]  [facility] [address]               Radha Valle   2017 11:03 PM   ED    Description:  Female : 1965   Department:  [facility]           Your Care was Coordinated By:     Provider Role From To    Margo Shay MD Attending Provider 17 1991 --      Reason for Visit     Dizziness           Diagnoses this Visit        Comments    Near syncope    -  Primary       ED Disposition     None           To Do List           Follow-up Information     Schedule an appointment as soon as possible for a visit with Melba Caro MD.    Specialty:  Family Medicine    Contact information:    [address]  [facility] [address]  [phone]          Follow up with [facility].    Specialty:  Emergency Medicine    Why:  As needed, If symptoms worsen    Contact information:    [address]  [facility] [id number] [phone]      [facility]     [facility] -  Assistance  Registered nurses in the [facility] provide clinical advisement, health education, appointment booking, and other advisory services.  Call for this free service at [phone].             Medications           Message regarding Medications     Verify the changes and/or additions to your medication regime listed below are the same as discussed with your clinician today.  If any of these changes or additions are incorrect, please notify your healthcare provider.        These medications were administered today        Dose Freq    sodium chloride 0.9% bolus 1,000 mL 1,000 mL ED 1 Time    Sig: Inject 1,000 mLs into the vein ED 1 Time.    Class: Normal    Route: Intravenous      STOP taking these medications     varenicline (CHANTIX) 1 mg Tab Take 1 tablet (1 mg total) by mouth 2 (two) times daily.           Verify that the below list of medications is an accurate  representation of the medications you are currently taking.  If none reported, the list may be blank. If incorrect, please contact your healthcare provider. Carry this list with you in case of emergency.           Current Medications     aspirin 81 MG Chew Take 81 mg by mouth once daily.    lisinopril 10 MG tablet Take 10 mg by mouth once daily.    vitamin D 1000 units Tab Take 185 mg by mouth once daily.    arm brace Misc 1 application by Misc.(Non-Drug; Combo Route) route daily as needed. Lateral epicondylitis    meloxicam (MOBIC) 15 MG tablet Take 1 tablet (15 mg total) by mouth daily as needed for Pain.    nicotine (NICODERM CQ) 7 mg/24 hr Place 1 patch onto the skin once daily.           Clinical Reference Information           Your Vitals Were     BP                   134/66           Allergies as of 2/25/2017     No Known Allergies      Immunizations Administered on Date of Encounter - 2/25/2017     None      ED Micro, Lab, POCT     Start Ordered       Status Ordering Provider    02/25/17 0000 02/25/17 0000  CBC auto differential  Once      Final result     02/25/17 0000 02/25/17 0000  Comprehensive metabolic panel  Once      Final result     02/25/17 0000 02/25/17 0000  Brain natriuretic peptide  Once      Final result     02/25/17 0000 02/25/17 0000  Troponin I  Once      Final result     02/25/17 0000 02/25/17 0000  Protime-INR  Once      Final result     02/24/17 2312 02/24/17 2312  POCT glucose  Once      Final result     02/24/17 2308 02/24/17 2307  POCT glucose  Once      Acknowledged       ED Imaging Orders     Start Ordered       Status Ordering Provider    02/25/17 0000 02/25/17 0000  X-Ray Chest PA And Lateral  1 time imaging      Final result       Discharge References/Attachments     NEAR SYNCOPE, UNKNOWN (ENGLISH)      Your Scheduled Appointments     Mar 02, 2017  3:30 PM CST   Established Physical Therapy with Flavia Cleveland, LAKISHA   [facility] ([facility])    [address]  [facility] [address]   [phone]            Mar 06, 2017  3:30 PM CST   Established Physical Therapy with Flavia Cleveland PTA   [facility]    [address]  [facility] [address]   [phone]            Mar 09, 2017 10:00 AM CST   Established Physical Therapy with Henny Em, PT   [facility]    [address]  [facility] [address]   [phone]              Smoking Cessation     If you would like to quit smoking:   You may be eligible for free services if you are a [place] resident and started smoking cigarettes before September 1, 1988.  Call the Smoking [employer]essation [employer] (SCT) toll free at (234) 149-3200 or (909) 664-5524.   Call 1-800-QUIT-NOW if you do not meet the above criteria.             [facility] complies with applicable Federal civil rights laws and does not discriminate on the basis of race, color, national origin, age, disability, or sex.        Language Assistance Services     ATTENTION: Language assistance services are available, free of charge. Please call [phone].      ATENCIÓN: Si habla troy, tiene a acuña disposición servicios gratuitos de asistencia lingüística. Llame al [phone].     CHÚ Ý: N?u b?n nói Ti?ng Vi?t, có các d?ch v? h? tr? ngôn ng? mi?n phí dành cho b?n. G?i s? [phone].

## 2017-02-25 VITALS
TEMPERATURE: 98 F | WEIGHT: 197 LBS | OXYGEN SATURATION: 97 % | BODY MASS INDEX: 31.66 KG/M2 | HEART RATE: 68 BPM | RESPIRATION RATE: 16 BRPM | HEIGHT: 66 IN | DIASTOLIC BLOOD PRESSURE: 66 MMHG | SYSTOLIC BLOOD PRESSURE: 134 MMHG

## 2017-02-25 LAB
ALBUMIN SERPL BCP-MCNC: 3.7 G/DL
ALP SERPL-CCNC: 126 U/L
ALT SERPL W/O P-5'-P-CCNC: 22 U/L
ANION GAP SERPL CALC-SCNC: 8 MMOL/L
AST SERPL-CCNC: 22 U/L
BASOPHILS # BLD AUTO: 0.01 K/UL
BASOPHILS NFR BLD: 0.1 %
BILIRUB SERPL-MCNC: 0.1 MG/DL
BNP SERPL-MCNC: <10 PG/ML
BUN SERPL-MCNC: 16 MG/DL
CALCIUM SERPL-MCNC: 10 MG/DL
CHLORIDE SERPL-SCNC: 106 MMOL/L
CO2 SERPL-SCNC: 26 MMOL/L
CREAT SERPL-MCNC: 0.8 MG/DL
DIFFERENTIAL METHOD: ABNORMAL
EOSINOPHIL # BLD AUTO: 0.1 K/UL
EOSINOPHIL NFR BLD: 0.6 %
ERYTHROCYTE [DISTWIDTH] IN BLOOD BY AUTOMATED COUNT: 15.9 %
EST. GFR  (AFRICAN AMERICAN): >60 ML/MIN/1.73 M^2
EST. GFR  (NON AFRICAN AMERICAN): >60 ML/MIN/1.73 M^2
GLUCOSE SERPL-MCNC: 155 MG/DL
HCT VFR BLD AUTO: 36.3 %
HGB BLD-MCNC: 11.3 G/DL
INR PPP: 1
LYMPHOCYTES # BLD AUTO: 1.1 K/UL
LYMPHOCYTES NFR BLD: 14.7 %
MCH RBC QN AUTO: 23.9 PG
MCHC RBC AUTO-ENTMCNC: 31.1 %
MCV RBC AUTO: 77 FL
MONOCYTES # BLD AUTO: 0.5 K/UL
MONOCYTES NFR BLD: 6 %
NEUTROPHILS # BLD AUTO: 6.1 K/UL
NEUTROPHILS NFR BLD: 78.5 %
PLATELET # BLD AUTO: 234 K/UL
PMV BLD AUTO: 10.4 FL
POTASSIUM SERPL-SCNC: 4 MMOL/L
PROT SERPL-MCNC: 7.7 G/DL
PROTHROMBIN TIME: 10.9 SEC
RBC # BLD AUTO: 4.73 M/UL
SODIUM SERPL-SCNC: 140 MMOL/L
TROPONIN I SERPL DL<=0.01 NG/ML-MCNC: <0.006 NG/ML
WBC # BLD AUTO: 7.71 K/UL

## 2017-02-25 PROCEDURE — 83880 ASSAY OF NATRIURETIC PEPTIDE: CPT

## 2017-02-25 PROCEDURE — 85025 COMPLETE CBC W/AUTO DIFF WBC: CPT

## 2017-02-25 PROCEDURE — 80053 COMPREHEN METABOLIC PANEL: CPT

## 2017-02-25 PROCEDURE — 85610 PROTHROMBIN TIME: CPT

## 2017-02-25 PROCEDURE — 84484 ASSAY OF TROPONIN QUANT: CPT

## 2017-02-25 PROCEDURE — 25000003 PHARM REV CODE 250: Performed by: EMERGENCY MEDICINE

## 2017-02-25 RX ADMIN — SODIUM CHLORIDE 1000 ML: 0.9 INJECTION, SOLUTION INTRAVENOUS at 01:02

## 2017-02-25 NOTE — ED NOTES
"Pt to ED c/o dizziness and nausea x a few hours. Pt reports hx of dizziness and  "room spinning" in the past. Pt denies vomiting, pain, SOB, and LOC. Pt AAOx4 and appropriate at this time. Respirations even and unlabored. No acute distress noted. Awaiting further orders. Pt updated on POC. Bed is locked and in lowest position with side rails up x2. Call bell within reach and pt oriented to use of call bell. Pt on continuous pulse ox, and continuous BP cuff. Will continue to monitor.     "

## 2017-02-25 NOTE — ED PROVIDER NOTES
"Encounter Date: 2/24/2017    SCRIBE #1 NOTE: I, Pily Bolanos, am scribing for, and in the presence of, Dr. Shay.       History     Chief Complaint   Patient presents with    Dizziness     pt reports having an episodes of dizziness tonight PTA denies LOC     Review of patient's allergies indicates:  No Known Allergies  HPI Comments: Time seen by provider: 12:55 AM    This is a 51 y.o. female who presents with complaint of presyncope that began PTA.  The patient reports that she began feeling dizzy while walking down a flight of stairs. After resting, she claims that her dizziness worsened and she had a brief episode of chest pain described as "tightness". She also states that she experienced lightheadedness and SOB. Upon arrival to the ED, she states that she feels improved. She mentions no fever, chills, nasal congestion, rhinorrhea, sore throat, cough, leg swelling, abdominal pain, N/V/D, decreased urine, dysuria, changes in urinary frequency or urgency, difficulty urinating, hematuria, or calf pain. She reports no identifying, alleviating, or exacerbating factors. The patient denies skipping meals and admits to proper hydration. She reports history of prediabetes, but she denies history of HTN, cancer, hepatitis, and GERD. She denies recent surgeries.    The history is provided by the patient.     Past Medical History:   Diagnosis Date    Bulging lumbar disc     Bulging of cervical intervertebral disc     Hyperlipidemia     Hypertension     Obese     Prediabetes     Vitamin D deficiency      Past Surgical History:   Procedure Laterality Date    TUBAL LIGATION      WISDOM TOOTH EXTRACTION       Family History   Problem Relation Age of Onset    Diabetes Mother     Hyperlipidemia Mother     Hypertension Mother     COPD Father     Diabetes Sister     Alcohol abuse Brother     Cancer Brother      liver    Diabetes Sister     Diabetes Sister     Hypertension Brother     Hypertension Brother     " Stroke Brother     Breast cancer Cousin      Social History   Substance Use Topics    Smoking status: Former Smoker     Packs/day: 0.50     Years: 30.00     Types: Cigarettes     Quit date: 11/17/2016    Smokeless tobacco: Never Used    Alcohol use 0.0 oz/week     0 Standard drinks or equivalent per week      Comment: social     Review of Systems   Constitutional: Negative for chills and fever.   HENT: Negative for congestion, facial swelling, rhinorrhea and sore throat.    Respiratory: Positive for chest tightness. Negative for cough and shortness of breath.    Cardiovascular: Positive for chest pain. Negative for leg swelling.   Gastrointestinal: Negative for abdominal pain, diarrhea, nausea and vomiting.   Endocrine: Negative for polyuria.   Genitourinary: Negative for decreased urine volume, difficulty urinating, dysuria, frequency, hematuria and urgency.   Musculoskeletal: Negative for myalgias.   Skin: Negative for rash.   Neurological: Positive for dizziness and light-headedness. Negative for syncope and headaches.        Positive for presyncope.        Physical Exam   Initial Vitals   BP Pulse Resp Temp SpO2   02/24/17 2201 02/24/17 2201 02/24/17 2201 02/24/17 2201 02/24/17 2201   148/90 114 18 98.4 °F (36.9 °C) 96 %     Vitals:    02/25/17 0021 02/25/17 0051 02/25/17 0151 02/25/17 0221   BP: 130/72 125/80 136/76 127/84   Pulse: 88 80 70 70   Resp: 17 19 18 18   Temp:       TempSrc:       SpO2: 98% 97% 98% 96%   Weight:       Height:        02/25/17 0251   BP: 134/66   Pulse: 68   Resp: 16   Temp:    TempSrc:    SpO2: 97%   Weight:    Height:        Physical Exam    Nursing note and vitals reviewed.  Constitutional: She appears well-developed and well-nourished. She is not diaphoretic. No distress.   HENT:   Head: Normocephalic and atraumatic.   Right Ear: External ear normal.   Left Ear: External ear normal.   Eyes: EOM are normal. Right eye exhibits no discharge. Left eye exhibits no discharge.   Neck:  Normal range of motion.   Cardiovascular: Normal rate, regular rhythm and normal heart sounds. Exam reveals no gallop and no friction rub.    No murmur heard.  Pulmonary/Chest: Breath sounds normal. No respiratory distress. She has no wheezes. She has no rhonchi. She has no rales.   Abdominal: Soft. There is no tenderness. There is no rebound and no guarding.   Musculoskeletal: Normal range of motion. She exhibits no edema or tenderness.   No lower extremity edema. No calf tenderness.    Neurological: She is alert and oriented to person, place, and time. She has normal strength. No sensory deficit.   Skin: Skin is warm and dry. No rash and no abscess noted. No erythema. No pallor.   Psychiatric: She has a normal mood and affect. Her behavior is normal. Judgment and thought content normal.         ED Course   Procedures  Labs Reviewed   CBC W/ AUTO DIFFERENTIAL - Abnormal; Notable for the following:        Result Value    Hemoglobin 11.3 (*)     Hematocrit 36.3 (*)     MCV 77 (*)     MCH 23.9 (*)     MCHC 31.1 (*)     RDW 15.9 (*)     Gran% 78.5 (*)     Lymph% 14.7 (*)     All other components within normal limits   COMPREHENSIVE METABOLIC PANEL - Abnormal; Notable for the following:     Glucose 155 (*)     All other components within normal limits   POCT GLUCOSE - Abnormal; Notable for the following:     POCT Glucose 131 (*)     All other components within normal limits   B-TYPE NATRIURETIC PEPTIDE   TROPONIN I   PROTIME-INR     Imaging Results         X-Ray Chest PA And Lateral (Final result) Result time:  02/25/17 00:43:53    Final result by Thomas Rbobins MD (02/25/17 00:43:53)    Impression:        Normal radiographic appearance of the chest.      Electronically signed by: THOMAS ROBBINS MD, MD  Date:     02/25/17  Time:    00:43     Narrative:    COMPARISON: None    FINDINGS: Two views of the chest. Monitoring leads overlie the chest.   Pulmonary vasculature and hilar regions are within normal limits.  The  bilateral lungs are well expanded and clear.  No pleural effusion or pneumothorax.  The heart and mediastinal contours are within normal limits for age.  Included osseous structures appear intact.              EKG Readings: (Independently Interpreted)   Initial Reading: No STEMI.   Sinus tachycardia at rate of 116 with no STEMI and no abnormal T wave inversions.         X-Rays:   Independently Interpreted Readings:   Chest X-Ray: X-Ray Chest PA And Lateral: No infiltrate, effusion, pneumothorax, or acute process.      Medical Decision Making:   Clinical Tests:   Lab Tests: Ordered and Reviewed  Radiological Study: Ordered and Reviewed  Medical Tests: Reviewed and Ordered  ED Management:  Emergent evaluation of 52-year-old female with complaint of dizziness and a near syncopal episode.  Although triage vital signs reveal tachycardia, this had resolved by time of my exam.  Physical exam is benign.  EKG shows no acute ischemic change or dysrhythmia.  Labs are reassuring with no major electrolyte disturbance, profound anemia, renal failure.  Screening troponin is negative.  I doubt acute MI.  Chest x-ray shows no acute process.  She was monitored in the ER on telemetry without any dysrhythmia and was discharged in good condition.  I've advised close follow-up with PCP for Holter monitor and return for any new or worsening symptoms.            Scribe Attestation:   Scribe #1: I performed the above scribed service and the documentation accurately describes the services I performed. I attest to the accuracy of the note.    Attending Attestation:           Physician Attestation for Scribe:  Physician Attestation Statement for Scribe #1: I, Dr. Shay, reviewed documentation, as scribed by Pily Bolanos in my presence, and it is both accurate and complete.                 ED Course     Clinical Impression:     1. Near syncope                Margo Shay MD  03/06/17 5601

## 2017-03-01 ENCOUNTER — TELEPHONE (OUTPATIENT)
Dept: SMOKING CESSATION | Facility: CLINIC | Age: 52
End: 2017-03-01

## 2017-03-01 NOTE — TELEPHONE ENCOUNTER
Successful contact at [phone]. Following up on patient quit status. States she is not doing to well and thanked me for checking on her and ended the call. Will attempt to contact patient again.

## 2017-03-30 ENCOUNTER — OFFICE VISIT (OUTPATIENT)
Dept: INTERNAL MEDICINE | Facility: CLINIC | Age: 52
End: 2017-03-30
Attending: INTERNAL MEDICINE
Payer: COMMERCIAL

## 2017-03-30 VITALS
WEIGHT: 188.94 LBS | DIASTOLIC BLOOD PRESSURE: 68 MMHG | HEART RATE: 79 BPM | SYSTOLIC BLOOD PRESSURE: 120 MMHG | BODY MASS INDEX: 30.36 KG/M2 | HEIGHT: 66 IN

## 2017-03-30 DIAGNOSIS — R21 RASH AND NONSPECIFIC SKIN ERUPTION: Primary | ICD-10-CM

## 2017-03-30 PROCEDURE — 3078F DIAST BP <80 MM HG: CPT | Mod: S$GLB,,, | Performed by: INTERNAL MEDICINE

## 2017-03-30 PROCEDURE — 3074F SYST BP LT 130 MM HG: CPT | Mod: S$GLB,,, | Performed by: INTERNAL MEDICINE

## 2017-03-30 PROCEDURE — 1160F RVW MEDS BY RX/DR IN RCRD: CPT | Mod: S$GLB,,, | Performed by: INTERNAL MEDICINE

## 2017-03-30 PROCEDURE — 99999 PR PBB SHADOW E&M-EST. PATIENT-LVL III: CPT | Mod: PBBFAC,,, | Performed by: INTERNAL MEDICINE

## 2017-03-30 PROCEDURE — 99213 OFFICE O/P EST LOW 20 MIN: CPT | Mod: S$GLB,,, | Performed by: INTERNAL MEDICINE

## 2017-03-30 RX ORDER — TRIAMCINOLONE ACETONIDE 1 MG/G
CREAM TOPICAL 2 TIMES DAILY
Qty: 15 G | Refills: 1 | Status: SHIPPED | OUTPATIENT
Start: 2017-03-30 | End: 2018-08-23 | Stop reason: ALTCHOICE

## 2017-03-30 NOTE — MR AVS SNAPSHOT
[facility]  [address]  [facility] [address]  Phone: [phone]  Fax: [phone]                  Radha Valle   3/30/2017 3:00 PM   Office Visit    Description:  Female : 1965   Provider:  Nakul Jin MD   Department:  [facility]           Reason for Visit     Varicella     bumps           Diagnoses this Visit        Comments    Rash and nonspecific skin eruption    -  Primary            To Do List           Goals (5 Years of Data)     None       These Medications        Disp Refills Start End    triamcinolone acetonide 0.1% (KENALOG) 0.1 % cream 15 g 1 3/30/2017 2017    Apply topically 2 (two) times daily. - Topical (Top)    Pharmacy: [facility] [address] [place] [address] #: [phone]         [facility]     [facility] Nurse Care Line -  Assistance  Unless otherwise directed by your provider, please contact [facility], our nurse care line that is available for  assistance.     Registered nurses in the [facility] provide: appointment scheduling, clinical advisement, health education, and other advisory services.  Call: [phone] (toll free)               Medications           Message regarding Medications     Verify the changes and/or additions to your medication regime listed below are the same as discussed with your clinician today.  If any of these changes or additions are incorrect, please notify your healthcare provider.        START taking these NEW medications        Refills    triamcinolone acetonide 0.1% (KENALOG) 0.1 % cream 1    Sig: Apply topically 2 (two) times daily.    Class: Normal    Route: Topical (Top)           Verify that the below list of medications is an accurate representation of the medications you are currently taking.  If none reported, the list may be blank. If incorrect, please contact your healthcare provider. Carry  "this list with you in case of emergency.           Current Medications     arm brace Misc 1 application by Misc.(Non-Drug; Combo Route) route daily as needed. Lateral epicondylitis    aspirin 81 MG Chew Take 81 mg by mouth once daily.    lisinopril 10 MG tablet Take 10 mg by mouth once daily.    meloxicam (MOBIC) 15 MG tablet Take 1 tablet (15 mg total) by mouth daily as needed for Pain.    nicotine (NICODERM CQ) 7 mg/24 hr Place 1 patch onto the skin once daily.    triamcinolone acetonide 0.1% (KENALOG) 0.1 % cream Apply topically 2 (two) times daily.    vitamin D 1000 units Tab Take 185 mg by mouth once daily.           Clinical Reference Information           Your Vitals Were     BP Pulse Height Weight BMI    120/68 (BP Location: Left arm, Patient Position: Sitting, BP Method: Manual) 79 5' 6" (1.676 m) 85.7 kg (188 lb 15 oz) 30.49 kg/m2      Blood Pressure          Most Recent Value    BP  120/68      Allergies as of 3/30/2017     No Known Allergies      Immunizations Administered on Date of Encounter - 3/30/2017     None      Orders Placed During Today's Visit      Normal Orders This Visit    Ambulatory referral to Dermatology       Language Assistance Services     ATTENTION: Language assistance services are available, free of charge. Please call [phone].      ATENCIÓN: Si maurice troy, tiene a acuña disposición servicios gratuitos de asistencia lingüística. Llame al [phone].     [facility] Ý: N?u b?n nói Ti?ng Vi?t, có các d?ch v? h? tr? ngôn ng? mi?n phí dành cho b?n. G?i s? [phone].         [facility] complies with applicable Federal civil rights laws and does not discriminate on the basis of race, color, national origin, age, disability, or sex.        "

## 2017-03-30 NOTE — PROGRESS NOTES
"Subjective:       Patient ID: Radha Valle is a 52 y.o. female.    Chief Complaint: Varicella (poss chicken pox) and bumps (general body)    HPI Comments: Here for urgent visit    4 days ago developed a red rash on her face. She was starting her menstrual cycle and this rash is common around this time but then she developed occasional bumps on arms, chest, back, buttocks, and legs. Lesions are itchy, non painful. She denies any new hair or cleaning products or clothing. She has 2 small dogs at home and reports strict adherence with flee regimen. She denies swelling of lips, tongue, and throat.       Review of Systems    Objective:      Vitals:    03/30/17 1459   BP: 120/68   BP Location: Left arm   Patient Position: Sitting   BP Method: Manual   Pulse: 79   Weight: 85.7 kg (188 lb 15 oz)   Height: 5' 6" (1.676 m)      Physical Exam   Constitutional: She is oriented to person, place, and time. She appears well-developed and well-nourished. She does not have a sickly appearance. No distress.   HENT:   Head: Normocephalic and atraumatic.   Eyes: Conjunctivae and EOM are normal. Right eye exhibits no discharge. Left eye exhibits no discharge. No scleral icterus.   Pulmonary/Chest: Effort normal. No respiratory distress.   Abdominal: Normal appearance. She exhibits no distension.   Neurological: She is alert and oriented to person, place, and time.   Skin: Skin is warm and dry. She is not diaphoretic.   Scarce, individual erythematous pustules (no groupings or clusters). No vesicles. No concentration around fingers or folds.    Psychiatric: She has a normal mood and affect. Her speech is normal.       Assessment:       1. Rash and nonspecific skin eruption        Plan:       Radha was seen today for varicella and bumps.    Diagnoses and all orders for this visit:    Rash and nonspecific skin eruption  -unclear etiology. Some type of bites?  -     Ambulatory referral to Dermatology  -     triamcinolone " acetonide 0.1% (KENALOG) 0.1 % cream; Apply topically 2 (two) times daily.             Side effects of medication(s) were discussed in detail and patient voiced understanding.  Patient will call back for any issues or complications.

## 2017-05-04 ENCOUNTER — CLINICAL SUPPORT (OUTPATIENT)
Dept: SMOKING CESSATION | Facility: CLINIC | Age: 52
End: 2017-05-04
Payer: COMMERCIAL

## 2017-05-04 DIAGNOSIS — F17.200 NICOTINE DEPENDENCE: Primary | ICD-10-CM

## 2017-05-04 PROCEDURE — 99407 BEHAV CHNG SMOKING > 10 MIN: CPT | Mod: S$GLB,,, | Performed by: INTERNAL MEDICINE

## 2017-06-20 ENCOUNTER — DOCUMENTATION ONLY (OUTPATIENT)
Dept: REHABILITATION | Facility: HOSPITAL | Age: 52
End: 2017-06-20

## 2017-06-20 NOTE — PROGRESS NOTES
DC FROM [facility] PT    Pt was treated one time on 2/15/17 for initial evaluation only.  She did not return for any further follow up.  Goals of PT not met.  Pt had several no shows and cancellations.  DC from [facility] as pt did not return after the intial evaluation.

## 2017-08-01 ENCOUNTER — TELEPHONE (OUTPATIENT)
Dept: INTERNAL MEDICINE | Facility: CLINIC | Age: 52
End: 2017-08-01

## 2017-08-01 ENCOUNTER — OFFICE VISIT (OUTPATIENT)
Dept: INTERNAL MEDICINE | Facility: CLINIC | Age: 52
End: 2017-08-01
Attending: FAMILY MEDICINE
Payer: COMMERCIAL

## 2017-08-01 ENCOUNTER — LAB VISIT (OUTPATIENT)
Dept: LAB | Facility: OTHER | Age: 52
End: 2017-08-01
Attending: FAMILY MEDICINE
Payer: COMMERCIAL

## 2017-08-01 VITALS
HEART RATE: 70 BPM | WEIGHT: 175.5 LBS | DIASTOLIC BLOOD PRESSURE: 76 MMHG | OXYGEN SATURATION: 98 % | HEIGHT: 66 IN | BODY MASS INDEX: 28.21 KG/M2 | SYSTOLIC BLOOD PRESSURE: 118 MMHG

## 2017-08-01 DIAGNOSIS — Z00.00 ANNUAL PHYSICAL EXAM: ICD-10-CM

## 2017-08-01 DIAGNOSIS — R73.03 PREDIABETES: ICD-10-CM

## 2017-08-01 DIAGNOSIS — R68.81 EARLY SATIETY: ICD-10-CM

## 2017-08-01 DIAGNOSIS — F17.200 TOBACCO USE DISORDER: ICD-10-CM

## 2017-08-01 DIAGNOSIS — Z00.00 ANNUAL PHYSICAL EXAM: Primary | ICD-10-CM

## 2017-08-01 DIAGNOSIS — R63.4 WEIGHT LOSS: ICD-10-CM

## 2017-08-01 DIAGNOSIS — E87.5 HYPERKALEMIA: Primary | ICD-10-CM

## 2017-08-01 LAB
25(OH)D3+25(OH)D2 SERPL-MCNC: 28 NG/ML
ALBUMIN SERPL BCP-MCNC: 3.4 G/DL
ALP SERPL-CCNC: 119 U/L
ALT SERPL W/O P-5'-P-CCNC: 14 U/L
ANION GAP SERPL CALC-SCNC: 9 MMOL/L
AST SERPL-CCNC: 20 U/L
BASOPHILS # BLD AUTO: 0.01 K/UL
BASOPHILS NFR BLD: 0.2 %
BILIRUB SERPL-MCNC: 0.2 MG/DL
BUN SERPL-MCNC: 13 MG/DL
CALCIUM SERPL-MCNC: 9.6 MG/DL
CHLORIDE SERPL-SCNC: 106 MMOL/L
CHOLEST/HDLC SERPL: 3.5 {RATIO}
CO2 SERPL-SCNC: 25 MMOL/L
CREAT SERPL-MCNC: 0.8 MG/DL
DIFFERENTIAL METHOD: ABNORMAL
EOSINOPHIL # BLD AUTO: 0.1 K/UL
EOSINOPHIL NFR BLD: 2.1 %
ERYTHROCYTE [DISTWIDTH] IN BLOOD BY AUTOMATED COUNT: 16.2 %
EST. GFR  (AFRICAN AMERICAN): >60 ML/MIN/1.73 M^2
EST. GFR  (NON AFRICAN AMERICAN): >60 ML/MIN/1.73 M^2
GLUCOSE SERPL-MCNC: 84 MG/DL
HCT VFR BLD AUTO: 37.9 %
HDL/CHOLESTEROL RATIO: 28.8 %
HDLC SERPL-MCNC: 250 MG/DL
HDLC SERPL-MCNC: 72 MG/DL
HGB BLD-MCNC: 12 G/DL
LDLC SERPL CALC-MCNC: 158.8 MG/DL
LYMPHOCYTES # BLD AUTO: 2.1 K/UL
LYMPHOCYTES NFR BLD: 35.7 %
MCH RBC QN AUTO: 24.7 PG
MCHC RBC AUTO-ENTMCNC: 31.7 G/DL
MCV RBC AUTO: 78 FL
MONOCYTES # BLD AUTO: 0.4 K/UL
MONOCYTES NFR BLD: 6.7 %
NEUTROPHILS # BLD AUTO: 3.2 K/UL
NEUTROPHILS NFR BLD: 55.1 %
NONHDLC SERPL-MCNC: 178 MG/DL
PLATELET # BLD AUTO: 259 K/UL
PMV BLD AUTO: 10.7 FL
POTASSIUM SERPL-SCNC: 5.4 MMOL/L
PROT SERPL-MCNC: 7.4 G/DL
RBC # BLD AUTO: 4.85 M/UL
SODIUM SERPL-SCNC: 140 MMOL/L
TRIGL SERPL-MCNC: 96 MG/DL
WBC # BLD AUTO: 5.8 K/UL

## 2017-08-01 PROCEDURE — 85025 COMPLETE CBC W/AUTO DIFF WBC: CPT

## 2017-08-01 PROCEDURE — 36415 COLL VENOUS BLD VENIPUNCTURE: CPT

## 2017-08-01 PROCEDURE — 80053 COMPREHEN METABOLIC PANEL: CPT

## 2017-08-01 PROCEDURE — 83036 HEMOGLOBIN GLYCOSYLATED A1C: CPT

## 2017-08-01 PROCEDURE — 82306 VITAMIN D 25 HYDROXY: CPT

## 2017-08-01 PROCEDURE — 99396 PREV VISIT EST AGE 40-64: CPT | Mod: S$GLB,,, | Performed by: FAMILY MEDICINE

## 2017-08-01 PROCEDURE — 80061 LIPID PANEL: CPT

## 2017-08-01 PROCEDURE — 99999 PR PBB SHADOW E&M-EST. PATIENT-LVL IV: CPT | Mod: PBBFAC,,, | Performed by: FAMILY MEDICINE

## 2017-08-01 RX ORDER — LISINOPRIL 10 MG/1
10 TABLET ORAL DAILY
Qty: 90 TABLET | Refills: 0 | Status: CANCELLED | OUTPATIENT
Start: 2017-08-01

## 2017-08-01 RX ORDER — LISINOPRIL 10 MG/1
10 TABLET ORAL DAILY
Qty: 90 TABLET | Refills: 2 | Status: SHIPPED | OUTPATIENT
Start: 2017-08-01 | End: 2018-08-23

## 2017-08-01 NOTE — TELEPHONE ENCOUNTER
Please inform potassium is elevated, need patient to be well hydrated with at least 90 fl oz water and to minimize amount of potassium in your diet--bananas, oranges, potatoes, spinach, tomatoes, etc.    Need recheck of potassium in 2-3 days

## 2017-08-01 NOTE — PROGRESS NOTES
"Subjective:      Patient ID: Radha Valle is a 52 y.o. female.    Chief Complaint: Pre-diabetes (loss of apetite, dehydration and thyroid problem)    [place]o has been fine, interest in hobbies there, started the gym this past week, sleep is ok, panic attacks once a month, no SI or HI. She will have an appetite around dinner. Her stress level is high at work during the day. She has noticed early satiety over the past month. She has a bowel movement every 3-4 days and it is soft. She denies blood or black tarry stools.       Review of Systems   Constitutional: Negative.    Respiratory: Negative.    Cardiovascular: Negative.    Gastrointestinal: Positive for constipation.   Genitourinary: Negative.    Neurological: Negative.      I personally reviewed Past Medical History, Past Surgical history,  Past Social History and Family History    Objective:   /76   Pulse 70   Ht 5' 6" (1.676 m)   Wt 79.6 kg (175 lb 7.8 oz)   SpO2 98%   BMI 28.32 kg/m²     Physical Exam   Constitutional: She is oriented to person, place, and time. She appears well-developed and well-nourished. No distress.   HENT:   Head: Normocephalic.   Right Ear: External ear normal.   Left Ear: External ear normal.   Mouth/Throat: Oropharynx is clear and moist.   Eyes: Conjunctivae and EOM are normal. Pupils are equal, round, and reactive to light. Right eye exhibits no discharge. Left eye exhibits no discharge. No scleral icterus.   Neck: Normal range of motion. Neck supple.   Cardiovascular: Normal rate, regular rhythm, normal heart sounds and intact distal pulses.  Exam reveals no gallop.    No murmur heard.  Pulmonary/Chest: Effort normal and breath sounds normal. No respiratory distress. She has no wheezes. She has no rales. She exhibits no tenderness.   Abdominal: Soft. Bowel sounds are normal. She exhibits no distension and no mass. There is no tenderness. There is no rebound and no guarding.   Musculoskeletal: Normal range of " motion.   Neurological: She is alert and oriented to person, place, and time.   Skin: Skin is warm and dry.   Psychiatric: She has a normal mood and affect. Her behavior is normal. Judgment and thought content normal.   Vitals reviewed.      Radha was seen today for pre-diabetes.    Diagnoses and all orders for this visit:    Annual physical exam  Prediabetes  -     CBC auto differential; Future  -     Comprehensive metabolic panel; Future  -     Lipid panel; Future  -     Hemoglobin A1c; Future  -     Vitamin D; Future    Tobacco use disorder  -     Ambulatory referral to Smoking Cessation Program    Early satiety  Weight loss  -return in 4 weeks for nurse visit weight check   -patient to follow up with GI to discuss EGD/colonscopy which is due this year  -     Ambulatory referral to Gastroenterology      HTN  -controlled cont lisinopril   -     Cancel: lisinopril 10 MG tablet; Take 1 tablet (10 mg total) by mouth once daily.  -     lisinopril 10 MG tablet; Take 1 tablet (10 mg total) by mouth once daily.

## 2017-08-01 NOTE — PATIENT INSTRUCTIONS
90 fl oz water daily     Take fiber supplements such as Metamucil, Citrucel, Konsyl, etc. (Benefiber is less effective so avoid)  The goal is 1-2 nonstraining nonloose bowel movements per day.  In general we recommend about 25-30 grams of fiber daily or reaching the above bowel movement goal.    If staying above 140/90 with manual blood pressure check

## 2017-08-01 NOTE — TELEPHONE ENCOUNTER
Left message for pt in regards to important lab results and advice. Pt only had one number provided in chart. Will try again at a later time today.

## 2017-08-02 LAB
ESTIMATED AVG GLUCOSE: 123 MG/DL
HBA1C MFR BLD HPLC: 5.9 %

## 2017-08-02 NOTE — TELEPHONE ENCOUNTER
Informed pt of lab results and advice. Pt verbalized understanding and had no further questions or concerns.  appt has been scheduled.

## 2017-08-04 ENCOUNTER — LAB VISIT (OUTPATIENT)
Dept: LAB | Facility: OTHER | Age: 52
End: 2017-08-04
Attending: FAMILY MEDICINE
Payer: COMMERCIAL

## 2017-08-04 DIAGNOSIS — E87.5 HYPERKALEMIA: ICD-10-CM

## 2017-08-04 LAB — POTASSIUM SERPL-SCNC: 4.7 MMOL/L

## 2017-08-04 PROCEDURE — 84132 ASSAY OF SERUM POTASSIUM: CPT

## 2017-08-04 PROCEDURE — 36415 COLL VENOUS BLD VENIPUNCTURE: CPT

## 2017-11-08 ENCOUNTER — CLINICAL SUPPORT (OUTPATIENT)
Dept: SMOKING CESSATION | Facility: CLINIC | Age: 52
End: 2017-11-08
Payer: COMMERCIAL

## 2017-11-08 DIAGNOSIS — F17.200 NICOTINE DEPENDENCE: Primary | ICD-10-CM

## 2017-11-08 PROCEDURE — 99406 BEHAV CHNG SMOKING 3-10 MIN: CPT | Mod: S$GLB,,,

## 2018-01-30 ENCOUNTER — TELEPHONE (OUTPATIENT)
Dept: OBSTETRICS AND GYNECOLOGY | Facility: CLINIC | Age: 53
End: 2018-01-30

## 2018-01-30 DIAGNOSIS — Z12.31 VISIT FOR SCREENING MAMMOGRAM: Primary | ICD-10-CM

## 2018-07-18 ENCOUNTER — PATIENT OUTREACH (OUTPATIENT)
Dept: ADMINISTRATIVE | Facility: HOSPITAL | Age: 53
End: 2018-07-18

## 2018-07-18 NOTE — PROGRESS NOTES
Patient was contacted to scheduled visit with Melba Caro MD. Attempt unsuccessful,will follow up with patient at a later time.     Left message for patient to contact office to schedule appointment. Letter mailed in efforts to reach patient.

## 2018-08-23 ENCOUNTER — OFFICE VISIT (OUTPATIENT)
Dept: OBSTETRICS AND GYNECOLOGY | Facility: CLINIC | Age: 53
End: 2018-08-23
Attending: OBSTETRICS & GYNECOLOGY
Payer: MEDICAID

## 2018-08-23 VITALS
WEIGHT: 190.25 LBS | HEIGHT: 66 IN | DIASTOLIC BLOOD PRESSURE: 80 MMHG | BODY MASS INDEX: 30.58 KG/M2 | SYSTOLIC BLOOD PRESSURE: 140 MMHG

## 2018-08-23 DIAGNOSIS — Z01.419 WELL FEMALE EXAM WITH ROUTINE GYNECOLOGICAL EXAM: Primary | ICD-10-CM

## 2018-08-23 PROCEDURE — 99213 OFFICE O/P EST LOW 20 MIN: CPT | Mod: PBBFAC | Performed by: OBSTETRICS & GYNECOLOGY

## 2018-08-23 PROCEDURE — 88175 CYTOPATH C/V AUTO FLUID REDO: CPT

## 2018-08-23 PROCEDURE — 99396 PREV VISIT EST AGE 40-64: CPT | Mod: S$PBB,,, | Performed by: OBSTETRICS & GYNECOLOGY

## 2018-08-23 PROCEDURE — 99999 PR PBB SHADOW E&M-EST. PATIENT-LVL III: CPT | Mod: PBBFAC,,, | Performed by: OBSTETRICS & GYNECOLOGY

## 2018-08-23 NOTE — PROGRESS NOTES
SUBJECTIVE:   53 y.o. female   for routine gyn exam. Patient's last menstrual period was 2018 (approximate)..  She reports light periods.  Some hot flashes.          Past Medical History:   Diagnosis Date    Bulging lumbar disc     Bulging of cervical intervertebral disc     Hyperlipidemia     Hypertension     Obese     Prediabetes     Vitamin D deficiency      Past Surgical History:   Procedure Laterality Date    TUBAL LIGATION      WISDOM TOOTH EXTRACTION       Social History     Socioeconomic History    Marital status:      Spouse name: Not on file    Number of children: Not on file    Years of education: Not on file    Highest education level: Not on file   Social Needs    Financial resource strain: Not on file    Food insecurity - worry: Not on file    Food insecurity - inability: Not on file    Transportation needs - medical: Not on file    Transportation needs - non-medical: Not on file   Occupational History    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  He has autism and is 20 yrs old.  Another son also had autism and is .       Family History   Problem Relation Age of Onset    Diabetes Mother     Hyperlipidemia Mother     Hypertension Mother     COPD Father     Diabetes Sister     Alcohol abuse Brother     Cancer Brother         liver    Diabetes Sister     Diabetes Sister     Hypertension Brother      "Hypertension Brother     Stroke Brother     Breast cancer Cousin     Colon cancer Neg Hx     Ovarian cancer Neg Hx      OB History    Para Term  AB Living   4 3     1 3   SAB TAB Ectopic Multiple Live Births           3      # Outcome Date GA Lbr Paul/2nd Weight Sex Delivery Anes PTL Lv   4 AB            3 Para            2 Para            1 Para                     Current Outpatient Medications   Medication Sig Dispense Refill    aspirin 81 MG Chew Take 81 mg by mouth once daily.       No current facility-administered medications for this visit.      Allergies: Patient has no known allergies.     ROS:  Constitutional: no weight loss, weight gain, fever, fatigue  Eyes:  No vision changes, glasses/contacts  ENT/Mouth: No ulcers, sinus problems, ears ringing, headache  Cardiovascular: No inability to lie flat, chest pain, exercise intolerance, swelling, heart palpitations  Respiratory: No wheezing, coughing blood, shortness of breath, or cough  Gastrointestinal: No diarrhea, bloody stool, nausea/vomiting, constipation, gas, hemorrhoids  Genitourinary: No blood in urine, painful urination, urgency of urination, frequency of urination, incomplete emptying, incontinence, abnormal bleeding, painful periods, heavy periods, vaginal discharge, vaginal odor, painful intercourse, sexual problems, bleeding after intercourse.  Musculoskeletal: No muscle weakness  Skin/Breast: No painful breasts, nipple discharge, masses, rash, ulcers  Neurological: No passing out, seizures, numbness, headache  Endocrine: No diabetes, hypothyroid, hyperthyroid, +hot flashes, hair loss, abnormal hair growth, ance  Psychiatric: No depression, crying  Hematologic: No bruises, bleeding, swollen lymph nodes, anemia.      OBJECTIVE:   The patient appears well, alert, oriented x 3, in no distress.  BP (!) 140/80 (BP Location: Right arm, Patient Position: Sitting, BP Method: Large (Manual))   Ht 5' 6" (1.676 m)   Wt 86.3 kg (190 lb " 4.1 oz)   LMP 08/20/2018 (Approximate)   BMI 30.71 kg/m²   NECK: no thyromegaly, trachea midline  SKIN: no acne, striae, hirsutism  CHEST: CTAB  CV: RRR  BREAST EXAM: breasts appear normal, no suspicious masses, no skin or nipple changes or axillary nodes  ABDOMEN: no hernias, masses, or hepatosplenomegaly  GENITALIA: normal external genitalia, no erythema, no discharge  URETHRA: normal urethra, normal urethral meatus  VAGINA: Normal  CERVIX: no lesions or cervical motion tenderness  UTERUS: normal size, contour, position, consistency, mobility, non-tender  ADNEXA: no mass, fullness, tenderness      ASSESSMENT:   1. Well female exam with routine gynecological exam  Liquid-based pap smear, screening       PLAN:   Orders Placed This Encounter    Liquid-based pap smear, screening     Discussed perimenopause, menopause sx, tx options.  Handouts provided  Return to clinic in 1 year

## 2018-08-28 ENCOUNTER — CLINICAL SUPPORT (OUTPATIENT)
Dept: SMOKING CESSATION | Facility: CLINIC | Age: 53
End: 2018-08-28
Payer: COMMERCIAL

## 2018-08-28 ENCOUNTER — DOCUMENTATION ONLY (OUTPATIENT)
Dept: INTERNAL MEDICINE | Facility: CLINIC | Age: 53
End: 2018-08-28

## 2018-08-28 ENCOUNTER — HOSPITAL ENCOUNTER (OUTPATIENT)
Dept: RADIOLOGY | Facility: OTHER | Age: 53
Discharge: HOME | End: 2018-08-28
Attending: OBSTETRICS & GYNECOLOGY
Payer: MEDICAID

## 2018-08-28 ENCOUNTER — OFFICE VISIT (OUTPATIENT)
Dept: INTERNAL MEDICINE | Facility: CLINIC | Age: 53
End: 2018-08-28
Attending: FAMILY MEDICINE
Payer: MEDICAID

## 2018-08-28 VITALS
BODY MASS INDEX: 30.47 KG/M2 | OXYGEN SATURATION: 97 % | SYSTOLIC BLOOD PRESSURE: 144 MMHG | HEIGHT: 66 IN | DIASTOLIC BLOOD PRESSURE: 86 MMHG | HEART RATE: 65 BPM | WEIGHT: 189.63 LBS

## 2018-08-28 DIAGNOSIS — Z12.31 VISIT FOR SCREENING MAMMOGRAM: ICD-10-CM

## 2018-08-28 DIAGNOSIS — Z12.11 SCREENING FOR COLORECTAL CANCER: ICD-10-CM

## 2018-08-28 DIAGNOSIS — Z12.12 SCREENING FOR COLORECTAL CANCER: ICD-10-CM

## 2018-08-28 DIAGNOSIS — I10 ESSENTIAL HYPERTENSION: ICD-10-CM

## 2018-08-28 DIAGNOSIS — R73.03 PREDIABETES: ICD-10-CM

## 2018-08-28 DIAGNOSIS — G62.9 NEUROPATHY: ICD-10-CM

## 2018-08-28 DIAGNOSIS — Z00.00 ANNUAL PHYSICAL EXAM: Primary | ICD-10-CM

## 2018-08-28 DIAGNOSIS — F17.200 TOBACCO USE DISORDER: ICD-10-CM

## 2018-08-28 DIAGNOSIS — F17.200 NICOTINE DEPENDENCE: Primary | ICD-10-CM

## 2018-08-28 PROCEDURE — 77067 SCR MAMMO BI INCL CAD: CPT | Mod: TC

## 2018-08-28 PROCEDURE — 99999 PR PBB SHADOW E&M-EST. PATIENT-LVL IV: CPT | Mod: PBBFAC,,, | Performed by: FAMILY MEDICINE

## 2018-08-28 PROCEDURE — 99214 OFFICE O/P EST MOD 30 MIN: CPT | Mod: PBBFAC | Performed by: FAMILY MEDICINE

## 2018-08-28 PROCEDURE — 99999 PR PBB SHADOW E&M-EST. PATIENT-LVL I: CPT | Mod: PBBFAC,,,

## 2018-08-28 PROCEDURE — 99214 OFFICE O/P EST MOD 30 MIN: CPT | Mod: S$PBB,,, | Performed by: FAMILY MEDICINE

## 2018-08-28 PROCEDURE — 77063 BREAST TOMOSYNTHESIS BI: CPT | Mod: 26,,, | Performed by: RADIOLOGY

## 2018-08-28 PROCEDURE — 99403 PREV MED CNSL INDIV APPRX 45: CPT | Mod: S$GLB,,,

## 2018-08-28 PROCEDURE — 77067 SCR MAMMO BI INCL CAD: CPT | Mod: 26,,, | Performed by: RADIOLOGY

## 2018-08-28 RX ORDER — IBUPROFEN 200 MG
1 TABLET ORAL DAILY
Qty: 14 PATCH | Refills: 0 | Status: SHIPPED | OUTPATIENT
Start: 2018-08-28 | End: 2018-10-18 | Stop reason: SDUPTHER

## 2018-08-28 RX ORDER — LOSARTAN POTASSIUM 50 MG/1
50 TABLET ORAL DAILY
Qty: 30 TABLET | Refills: 2 | Status: SHIPPED | OUTPATIENT
Start: 2018-08-28 | End: 2019-02-18 | Stop reason: SDUPTHER

## 2018-08-28 RX ORDER — GABAPENTIN 100 MG/1
100 CAPSULE ORAL NIGHTLY
Qty: 30 CAPSULE | Refills: 1 | Status: SHIPPED | OUTPATIENT
Start: 2018-08-28 | End: 2019-02-18

## 2018-08-28 NOTE — PROGRESS NOTES
Patient presents to clinic for Quit attempt #3. Pt is smoking about 1/2 ppd of cigarettes. She reports not smoking cigarettes in a few days, but is now smoking about 2 black and mild cigars per day. Patient will be participating in biweekly tobacco cessation meetings and will begin the prescribed tobacco cessation medication regimen of the 21 mg nicotine QAM. Pt is to f/u in 2 weeks.

## 2018-08-28 NOTE — PROGRESS NOTES
"Subjective:      Patient ID: Radha Valle is a 53 y.o. female.    Chief Complaint: Pre-diabetes (f/u) and Annual Exam    HPI   This patient has a past medical history of prediabetes with 14 pound weight gain in the last year. She does not like lisinopril because of the cough. She reports complete resolution of early satiety. In the last 3 months, 3 days out of 5 days she will have numbness tingling in her hands and feet. She denies dropping items. No neck or back injury. No pain radiating from the neck or back to the extremities.     Review of Systems   Constitutional: Negative for activity change, appetite change, chills, diaphoresis, fatigue, fever and unexpected weight change.   HENT: Negative for congestion, ear discharge, ear pain, hearing loss, postnasal drip, rhinorrhea, sinus pressure and sore throat.    Respiratory: Negative for cough, shortness of breath and wheezing.    Cardiovascular: Negative for chest pain.   Gastrointestinal: Negative for abdominal pain, constipation, diarrhea, nausea and vomiting.   Genitourinary: Negative for dysuria and frequency.   Musculoskeletal: Negative.    Psychiatric/Behavioral: Negative for suicidal ideas.     I personally reviewed Past Medical History, Past Surgical history,  Past Social History and Family History      Objective:   BP (!) 144/86   Pulse 65   Ht 5' 6" (1.676 m)   Wt 86 kg (189 lb 9.5 oz)   LMP 08/20/2018 (Approximate)   SpO2 97%   BMI 30.60 kg/m²     Physical Exam   Constitutional: She is oriented to person, place, and time. She appears well-developed and well-nourished. No distress.   HENT:   Head: Normocephalic and atraumatic.   Right Ear: Hearing, tympanic membrane, external ear and ear canal normal.   Left Ear: Hearing, tympanic membrane, external ear and ear canal normal.   Nose: Nose normal.   Mouth/Throat: Uvula is midline and oropharynx is clear and moist. No oropharyngeal exudate.   Eyes: Conjunctivae and EOM are normal. Pupils are " equal, round, and reactive to light. Right eye exhibits no discharge. Left eye exhibits no discharge. No scleral icterus.   Neck: Normal range of motion. Neck supple.   Cardiovascular: Normal rate, regular rhythm, normal heart sounds and intact distal pulses. Exam reveals no gallop.   No murmur heard.  Pulses:       Dorsalis pedis pulses are 2+ on the right side, and 2+ on the left side.        Posterior tibial pulses are 2+ on the right side, and 2+ on the left side.   Pulmonary/Chest: Effort normal and breath sounds normal. No respiratory distress. She has no wheezes. She has no rales. She exhibits no tenderness.   Abdominal: Soft. Bowel sounds are normal. She exhibits no distension and no mass. There is no tenderness. There is no rebound and no guarding.   Musculoskeletal:        Right hand: Normal.        Left hand: Normal.        Right foot: There is normal range of motion and no deformity.        Left foot: There is normal range of motion and no deformity.   Feet:   Right Foot:   Protective Sensation: 6 sites tested. 6 sites sensed.   Skin Integrity: Negative for ulcer or blister.   Left Foot:   Protective Sensation: 6 sites tested. 6 sites sensed.   Skin Integrity: Negative for ulcer or blister.   Neurological: She is alert and oriented to person, place, and time.   Skin: Skin is warm and dry.   Vitals reviewed.      1. Annual physical exam    2. Screening for colorectal cancer    3. Essential hypertension    4. Tobacco use disorder    5. Neuropathy    6. Prediabetes        1.labs ordered   2. Fit kit ordered  3.uncontrolled, patient non compliant with lisinopril, start losartan and return in 2 weeks  4. Referral placed for cessation    5. Trial gabapentin and check labs    6. Will check labs and dicussed dietary changes

## 2018-08-31 ENCOUNTER — LAB VISIT (OUTPATIENT)
Dept: LAB | Facility: HOSPITAL | Age: 53
End: 2018-08-31
Attending: FAMILY MEDICINE
Payer: MEDICAID

## 2018-08-31 DIAGNOSIS — Z00.00 ANNUAL PHYSICAL EXAM: ICD-10-CM

## 2018-08-31 DIAGNOSIS — Z12.12 SCREENING FOR COLORECTAL CANCER: ICD-10-CM

## 2018-08-31 DIAGNOSIS — Z12.11 SCREENING FOR COLORECTAL CANCER: ICD-10-CM

## 2018-08-31 LAB — HEMOCCULT STL QL IA: NEGATIVE

## 2018-08-31 PROCEDURE — 82274 ASSAY TEST FOR BLOOD FECAL: CPT

## 2018-09-06 ENCOUNTER — PATIENT MESSAGE (OUTPATIENT)
Dept: INTERNAL MEDICINE | Facility: CLINIC | Age: 53
End: 2018-09-06

## 2018-09-06 DIAGNOSIS — E78.5 HYPERLIPIDEMIA, UNSPECIFIED HYPERLIPIDEMIA TYPE: Primary | ICD-10-CM

## 2018-09-06 RX ORDER — PRAVASTATIN SODIUM 20 MG/1
20 TABLET ORAL NIGHTLY
Qty: 30 TABLET | Refills: 3 | Status: SHIPPED | OUTPATIENT
Start: 2018-09-06 | End: 2019-02-18 | Stop reason: SDUPTHER

## 2018-09-06 NOTE — TELEPHONE ENCOUNTER
Informed pt of lab results and advice. Pt verbalized understanding and had no further questions or concerns.

## 2018-09-13 ENCOUNTER — OFFICE VISIT (OUTPATIENT)
Dept: INTERNAL MEDICINE | Facility: CLINIC | Age: 53
End: 2018-09-13
Attending: FAMILY MEDICINE
Payer: MEDICAID

## 2018-09-13 ENCOUNTER — CLINICAL SUPPORT (OUTPATIENT)
Dept: SMOKING CESSATION | Facility: CLINIC | Age: 53
End: 2018-09-13
Payer: COMMERCIAL

## 2018-09-13 VITALS
WEIGHT: 190.5 LBS | HEIGHT: 66 IN | SYSTOLIC BLOOD PRESSURE: 130 MMHG | HEART RATE: 75 BPM | DIASTOLIC BLOOD PRESSURE: 60 MMHG | OXYGEN SATURATION: 98 % | BODY MASS INDEX: 30.62 KG/M2

## 2018-09-13 DIAGNOSIS — R73.03 PREDIABETES: ICD-10-CM

## 2018-09-13 DIAGNOSIS — G62.9 NEUROPATHY: Primary | ICD-10-CM

## 2018-09-13 DIAGNOSIS — E78.5 HYPERLIPIDEMIA, UNSPECIFIED HYPERLIPIDEMIA TYPE: ICD-10-CM

## 2018-09-13 DIAGNOSIS — F17.200 NICOTINE DEPENDENCE: Primary | ICD-10-CM

## 2018-09-13 PROCEDURE — 99402 PREV MED CNSL INDIV APPRX 30: CPT | Mod: S$GLB,,,

## 2018-09-13 PROCEDURE — 99214 OFFICE O/P EST MOD 30 MIN: CPT | Mod: S$PBB,,, | Performed by: FAMILY MEDICINE

## 2018-09-13 PROCEDURE — 99213 OFFICE O/P EST LOW 20 MIN: CPT | Mod: PBBFAC | Performed by: FAMILY MEDICINE

## 2018-09-13 PROCEDURE — 99999 PR PBB SHADOW E&M-EST. PATIENT-LVL I: CPT | Mod: PBBFAC,,,

## 2018-09-13 PROCEDURE — 99999 PR PBB SHADOW E&M-EST. PATIENT-LVL III: CPT | Mod: PBBFAC,,, | Performed by: FAMILY MEDICINE

## 2018-09-13 NOTE — PROGRESS NOTES
"Subjective:      Patient ID: Radha Valle is a 53 y.o. female.    Chief Complaint: Hypertension    HPI   Patient here for HTN and neuropathy follow up. She is tolerating losartan and pravastatin no side effects. She is drinking 1-2 bottles water daily. She thinks the gabapentin maybe helping for numbness and tingling.     Breakfast   decaff coffee-sugar/cream    Lunch   Fried chicken  Baked chicken/potatoes       Dinner   Fried chicken  Baked chicken/potatoes         Review of Systems   Constitutional: Negative.    Respiratory: Negative.    Cardiovascular: Negative.    Gastrointestinal: Negative.      I personally reviewed Past Medical History, Past Surgical history,  Past Social History and Family History      Objective:   /60   Pulse 75   Ht 5' 6" (1.676 m)   Wt 86.4 kg (190 lb 7.6 oz)   LMP 08/20/2018 (Approximate)   SpO2 98%   BMI 30.74 kg/m²     Physical Exam   Constitutional: She is oriented to person, place, and time. She appears well-developed and well-nourished. No distress.   HENT:   Head: Normocephalic and atraumatic.   Right Ear: Hearing, tympanic membrane, external ear and ear canal normal.   Left Ear: Hearing, tympanic membrane, external ear and ear canal normal.   Nose: Nose normal.   Mouth/Throat: Uvula is midline and oropharynx is clear and moist. No oropharyngeal exudate.   Eyes: Conjunctivae and EOM are normal. Pupils are equal, round, and reactive to light. Right eye exhibits no discharge. Left eye exhibits no discharge. No scleral icterus.   Neck: Normal range of motion. Neck supple.   Cardiovascular: Normal rate, regular rhythm, normal heart sounds and intact distal pulses. Exam reveals no gallop.   No murmur heard.  Pulmonary/Chest: Effort normal and breath sounds normal. No respiratory distress. She has no wheezes. She has no rales. She exhibits no tenderness.   Abdominal: Soft. Bowel sounds are normal. She exhibits no distension and no mass. There is no tenderness. " There is no rebound and no guarding.   Neurological: She is alert and oriented to person, place, and time.   Skin: Skin is warm and dry.   Vitals reviewed.      1. Neuropathy    2. Prediabetes    3. Hyperlipidemia, unspecified hyperlipidemia type        1.cont gabapentin, if no improvement will check EMG    2. Start jardiance, if not covered will start metformin, declined nutritionist   3. stable, cont pravastatin  -return in 2 months       Orders Placed This Encounter   Procedures    EMG W/ ULTRASOUND AND NERVE CONDUCTION TEST 4 Extremities     Medications Ordered This Encounter   Medications    empagliflozin (JARDIANCE) 10 mg Tab     Sig: Take 10 mg by mouth once daily.     Dispense:  30 tablet     Refill:  3

## 2018-09-13 NOTE — Clinical Note
Patient is back to smoking 3 cpd,but is no longer smoking the black and mild cigars. Patient has not started using the prescribed tobacco cessation medication regimen of the 21 mg patch. Pt stated she is really not sure why she hasn't started them. Advised and encouraged patient to start the patches tomorrow and limit herself to 1-2 cpd. Pt's goal for the week is to start using the patch. Will see pt back in 2 weeks for individual session.

## 2018-09-13 NOTE — PROGRESS NOTES
Individual Follow-Up Form    9/13/2018    Quit Date: TBD    Clinical Status of Patient: Outpatient    Length of Service: 30 minutes    Continuing Medication: no    Other Medications: none     Target Symptoms: Withdrawal and medication side effects. The following were  rated moderate (3) to severe (4) on TCRS:  · Moderate (3): N/A   · Severe (4): N/A    Comments:  Patient is back to smoking 3 cpd,but is no longer smoking the black and mild cigars. Patient has not started using the prescribed tobacco cessation medication regimen of the 21 mg patch. Pt stated she is really not sure why she hasn't started them. Advised and encouraged patient to start the patches tomorrow and limit herself to 1-2 cpd. Discussed rate reduction with her and encouraged her to wait 15 min prior to smoking, move location of where she keeps her cigarettes, and to stay distracted and develop new habits oppose to smoking. Pt's goal for the week is to start using the patch. Will see pt back in 2 weeks for individual session.          Diagnosis: F17.200    Next Visit: 2 weeks

## 2018-09-27 ENCOUNTER — CLINICAL SUPPORT (OUTPATIENT)
Dept: SMOKING CESSATION | Facility: CLINIC | Age: 53
End: 2018-09-27
Payer: COMMERCIAL

## 2018-09-27 DIAGNOSIS — F17.200 NICOTINE DEPENDENCE: Primary | ICD-10-CM

## 2018-09-27 PROCEDURE — 99407 BEHAV CHNG SMOKING > 10 MIN: CPT | Mod: S$GLB,,,

## 2018-10-18 ENCOUNTER — CLINICAL SUPPORT (OUTPATIENT)
Dept: SMOKING CESSATION | Facility: CLINIC | Age: 53
End: 2018-10-18
Payer: COMMERCIAL

## 2018-10-18 DIAGNOSIS — F17.200 NICOTINE DEPENDENCE: Primary | ICD-10-CM

## 2018-10-18 PROCEDURE — 99402 PREV MED CNSL INDIV APPRX 30: CPT | Mod: S$GLB,,,

## 2018-10-18 PROCEDURE — 99999 PR PBB SHADOW E&M-EST. PATIENT-LVL II: CPT | Mod: PBBFAC,,,

## 2018-10-18 RX ORDER — IBUPROFEN 200 MG
1 TABLET ORAL DAILY
Qty: 14 PATCH | Refills: 0 | Status: SHIPPED | OUTPATIENT
Start: 2018-10-18 | End: 2018-11-19 | Stop reason: DRUGHIGH

## 2018-10-18 NOTE — Clinical Note
Patient seen in clinic today for a follow up, she states tobacco free since 10/11/18. Commended patient on the accomplishment. Patient remains on prescribed tobacco cessation medication regimen of 21 mg patch without any negative side effects at this time. Refill sent to pharmacy, discussed a decrease to 14 mg patch next follow up. The patient denies any abnormal behavioral or mental changes at this time. Discussed and reviewed strategies, cues, triggers, along with high risk and stressful situations. The patient will follow up with individual sessions and medication monitoring by CTTS. Prescribed medication management will be by physician.

## 2018-10-18 NOTE — PROGRESS NOTES
Individual Follow-Up Form    10/18/2018    Quit Date: 10/11/2018    Clinical Status of Patient: Outpatient    Length of Service: 30 minutes    Continuing Medication: yes  Patches    Other Medications:      Target Symptoms: Withdrawal and medication side effects. The following were  rated moderate (3) to severe (4) on TCRS:  · Moderate (3): none  · Severe (4): none    Comments: Patient seen in clinic today for a follow up, she states tobacco free since 10/11/18. Commended patient on the accomplishment. Patient remains on prescribed tobacco cessation medication regimen of 21 mg patch without any negative side effects at this time. Refill sent to pharmacy, discussed a decrease to 14 mg patch next follow up. The patient denies any abnormal behavioral or mental changes at this time. Discussed and reviewed strategies, cues, triggers, along with high risk and stressful situations. The patient will follow up with individual sessions and medication monitoring by CTTS. Prescribed medication management will be by physician.           Diagnosis: F17.200    Next Visit: 11/6/18

## 2018-11-05 ENCOUNTER — TELEPHONE (OUTPATIENT)
Dept: INTERNAL MEDICINE | Facility: CLINIC | Age: 53
End: 2018-11-05

## 2018-11-05 NOTE — TELEPHONE ENCOUNTER
----- Message from Carol Casanova sent at 11/5/2018  9:19 AM CST -----  Contact: pt   Name of Who is Calling: KT GERARDO [[id number]]    What is the request in detail:Patient is requesting to be seen for a follow up appointment after stop smoking.....Please contact to further discuss and advise      Can the clinic reply by [facility]: No     What Number to Call Back if not in [facility]: [phone].

## 2018-11-06 ENCOUNTER — CLINICAL SUPPORT (OUTPATIENT)
Dept: SMOKING CESSATION | Facility: CLINIC | Age: 53
End: 2018-11-06
Payer: COMMERCIAL

## 2018-11-06 DIAGNOSIS — F17.210 NICOTINE DEPENDENCE, CIGARETTES, UNCOMPLICATED: Primary | ICD-10-CM

## 2018-11-06 PROCEDURE — 99407 BEHAV CHNG SMOKING > 10 MIN: CPT | Mod: S$GLB,,,

## 2018-11-06 PROCEDURE — 99999 PR PBB SHADOW E&M-EST. PATIENT-LVL I: CPT | Mod: PBBFAC,,,

## 2018-11-19 ENCOUNTER — CLINICAL SUPPORT (OUTPATIENT)
Dept: SMOKING CESSATION | Facility: CLINIC | Age: 53
End: 2018-11-19
Payer: COMMERCIAL

## 2018-11-19 DIAGNOSIS — F17.210 CIGARETTE NICOTINE DEPENDENCE, UNCOMPLICATED: Primary | ICD-10-CM

## 2018-11-19 PROCEDURE — 99999 PR PBB SHADOW E&M-EST. PATIENT-LVL I: CPT | Mod: PBBFAC,,,

## 2018-11-19 PROCEDURE — 99402 PREV MED CNSL INDIV APPRX 30: CPT | Mod: S$GLB,,,

## 2018-11-19 RX ORDER — IBUPROFEN 200 MG
1 TABLET ORAL DAILY
Qty: 14 PATCH | Refills: 0 | Status: SHIPPED | OUTPATIENT
Start: 2018-11-19 | End: 2019-02-18

## 2018-11-19 NOTE — Clinical Note
Patient remains tobacco free since 10/17/18.  Patient is no longer using nicotine patches but would like to have some on hand just in case she needs them.  She reports having some urges and cravings but is able to overcome them and refrain from tobacco use.  We reviewed high risk situations, alcohol use, lapses and relapses.  I commended patient on her achievement.  She seems confident about her quit.  Patient will follow up in clinic 3 weeks.

## 2018-11-19 NOTE — PROGRESS NOTES
Individual Follow-Up Form    11/19/2018    Quit Date: 10/17/18    Clinical Status of Patient: Outpatient    Length of Service: 60 minutes    Continuing Medication: no    Other Medications: none     Target Symptoms: Withdrawal and medication side effects. The following were  rated moderate (3) to severe (4) on TCRS:  · Moderate (3): none  · Severe (4): none  Comments: Patient remains tobacco free since 10/17/18.  Patient is no longer using nicotine patches but would like to have some on hand just in case she needs them.  She reports having some urges and cravings but is able to overcome them and refrain from tobacco use.  We reviewed high risk situations, alcohol use, lapses and relapses.  I commended patient on her achievement.  She seems confident about her quit.  Patient will follow up in clinic 3 weeks.       Diagnosis: F17.210    Next Visit: 1 week

## 2019-01-17 ENCOUNTER — TELEPHONE (OUTPATIENT)
Dept: SMOKING CESSATION | Facility: CLINIC | Age: 54
End: 2019-01-17

## 2019-01-24 ENCOUNTER — TELEPHONE (OUTPATIENT)
Dept: SMOKING CESSATION | Facility: CLINIC | Age: 54
End: 2019-01-24

## 2019-02-11 ENCOUNTER — TELEPHONE (OUTPATIENT)
Dept: INTERNAL MEDICINE | Facility: CLINIC | Age: 54
End: 2019-02-11

## 2019-02-11 NOTE — TELEPHONE ENCOUNTER
Spoke to Ms. Valle to confirm date and time for appt. Patient states understanding with no further questions.

## 2019-02-11 NOTE — TELEPHONE ENCOUNTER
----- Message from Raquel Smith sent at 2/7/2019  8:41 AM CST -----  Contact: Pt   Name of Who is Calling: KT GERARDO [[id number]]    What is the request in detail: Pt need a refill of her losartan (COZAAR) 50 MG tablet and was advised she cannot get a refill until she see Dr. Caro. There are no appts available due to her insurance and the pt states she took her last pill today. Please call to further discuss and advise.     Can the clinic reply by [facility]: No     [facility]: [phone]

## 2019-02-13 ENCOUNTER — TELEPHONE (OUTPATIENT)
Dept: INTERNAL MEDICINE | Facility: CLINIC | Age: 54
End: 2019-02-13

## 2019-02-13 NOTE — TELEPHONE ENCOUNTER
Spoke w/ pt regarding appt she has scheduled with Dr. Caro tomorrow for 3:00pm. Asked pt if she could come in for around noon, but pt said she couldn't due to work, then stated she would like to r/s for Monday 2/18 when pt is off of work.

## 2019-02-18 ENCOUNTER — OFFICE VISIT (OUTPATIENT)
Dept: INTERNAL MEDICINE | Facility: CLINIC | Age: 54
End: 2019-02-18
Attending: FAMILY MEDICINE
Payer: MEDICAID

## 2019-02-18 VITALS
BODY MASS INDEX: 31.36 KG/M2 | WEIGHT: 195.13 LBS | DIASTOLIC BLOOD PRESSURE: 88 MMHG | HEIGHT: 66 IN | SYSTOLIC BLOOD PRESSURE: 138 MMHG

## 2019-02-18 DIAGNOSIS — Z72.0 TOBACCO USE: ICD-10-CM

## 2019-02-18 DIAGNOSIS — E66.9 OBESITY, UNSPECIFIED CLASSIFICATION, UNSPECIFIED OBESITY TYPE, UNSPECIFIED WHETHER SERIOUS COMORBIDITY PRESENT: ICD-10-CM

## 2019-02-18 DIAGNOSIS — R73.03 PREDIABETES: Primary | ICD-10-CM

## 2019-02-18 DIAGNOSIS — I10 HYPERTENSION, UNSPECIFIED TYPE: ICD-10-CM

## 2019-02-18 DIAGNOSIS — Z00.00 ANNUAL PHYSICAL EXAM: ICD-10-CM

## 2019-02-18 DIAGNOSIS — E78.5 HYPERLIPIDEMIA, UNSPECIFIED HYPERLIPIDEMIA TYPE: ICD-10-CM

## 2019-02-18 PROCEDURE — 99214 PR OFFICE/OUTPT VISIT, EST, LEVL IV, 30-39 MIN: ICD-10-PCS | Mod: S$PBB,,, | Performed by: FAMILY MEDICINE

## 2019-02-18 PROCEDURE — 99999 PR PBB SHADOW E&M-EST. PATIENT-LVL III: CPT | Mod: PBBFAC,,, | Performed by: FAMILY MEDICINE

## 2019-02-18 PROCEDURE — 99214 OFFICE O/P EST MOD 30 MIN: CPT | Mod: S$PBB,,, | Performed by: FAMILY MEDICINE

## 2019-02-18 PROCEDURE — 99213 OFFICE O/P EST LOW 20 MIN: CPT | Mod: PBBFAC | Performed by: FAMILY MEDICINE

## 2019-02-18 PROCEDURE — 99999 PR PBB SHADOW E&M-EST. PATIENT-LVL III: ICD-10-PCS | Mod: PBBFAC,,, | Performed by: FAMILY MEDICINE

## 2019-02-18 RX ORDER — PRAVASTATIN SODIUM 20 MG/1
20 TABLET ORAL NIGHTLY
Qty: 90 TABLET | Refills: 3 | Status: SHIPPED | OUTPATIENT
Start: 2019-02-18 | End: 2019-04-02

## 2019-02-18 RX ORDER — BUPROPION HYDROCHLORIDE 150 MG/1
150 TABLET ORAL DAILY
Qty: 30 TABLET | Refills: 1 | Status: SHIPPED | OUTPATIENT
Start: 2019-02-18 | End: 2019-04-01

## 2019-02-18 RX ORDER — METFORMIN HYDROCHLORIDE 500 MG/1
500 TABLET ORAL
Qty: 30 TABLET | Refills: 3 | Status: SHIPPED | OUTPATIENT
Start: 2019-02-18 | End: 2019-05-02 | Stop reason: SDUPTHER

## 2019-02-18 RX ORDER — LOSARTAN POTASSIUM 50 MG/1
50 TABLET ORAL DAILY
Qty: 90 TABLET | Refills: 2 | Status: SHIPPED | OUTPATIENT
Start: 2019-02-18 | End: 2019-06-19

## 2019-02-18 NOTE — PROGRESS NOTES
"Subjective:      Patient ID: Radha Valle is a 53 y.o. female.    Chief Complaint: Medication Refill    HPI   Patient here today for prediabetes follow up. She has increased weight by 5 pounds in 5 months. She reports neuropathy has resolved. She has not taken losartan in months. She does drink vodka/juice on the weekends three in a setting. She just started smoking.     Breakfast  Dominguez/biscuit    Lunch   Fried fish   Fried chicken     Dinner   Fried fish   Fried chicken     Review of Systems   Constitutional: Negative for activity change, appetite change, chills, diaphoresis, fatigue, fever and unexpected weight change.   HENT: Negative for congestion, ear discharge, ear pain, hearing loss, postnasal drip, rhinorrhea, sinus pressure and sore throat.    Respiratory: Negative for cough, shortness of breath and wheezing.    Cardiovascular: Negative for chest pain.   Gastrointestinal: Negative for abdominal pain, constipation, diarrhea, nausea and vomiting.   Genitourinary: Negative for dysuria and frequency.   Musculoskeletal: Negative.    Psychiatric/Behavioral: Negative for suicidal ideas.     I personally reviewed Past Medical History, Past Surgical history,  Past Social History and Family History      Objective:   /88   Ht 5' 6" (1.676 m)   Wt 88.5 kg (195 lb 1.7 oz)   BMI 31.49 kg/m²     Physical Exam   Constitutional: She is oriented to person, place, and time. She appears well-developed and well-nourished. No distress.   HENT:   Head: Normocephalic and atraumatic.   Right Ear: Hearing, tympanic membrane, external ear and ear canal normal.   Left Ear: Hearing, tympanic membrane, external ear and ear canal normal.   Nose: Nose normal.   Mouth/Throat: Uvula is midline and oropharynx is clear and moist. No oropharyngeal exudate.   Eyes: Conjunctivae and EOM are normal. Pupils are equal, round, and reactive to light. Right eye exhibits no discharge. Left eye exhibits no discharge. No scleral " icterus.   Neck: Normal range of motion. Neck supple.   Cardiovascular: Normal rate, regular rhythm, normal heart sounds and intact distal pulses. Exam reveals no gallop.   No murmur heard.  Pulmonary/Chest: Effort normal and breath sounds normal. No respiratory distress. She has no wheezes. She has no rales. She exhibits no tenderness.   Abdominal: Soft. Bowel sounds are normal. She exhibits no distension and no mass. There is no tenderness. There is no rebound and no guarding.   Neurological: She is alert and oriented to person, place, and time.   Skin: Skin is warm and dry.   Vitals reviewed.      1. Prediabetes    2. Hypertension, unspecified type    3. Tobacco use    4. Hyperlipidemia, unspecified hyperlipidemia type            1-. Start metformin, discussed dietary changes   2. Borderline, restart losartan  3. Start wellbutrin for smoking cessation, return in 6 weeks   4. stable, cont current regimen, needs recheck LDL      No orders of the defined types were placed in this encounter.    Medications Ordered This Encounter   Medications    buPROPion (WELLBUTRIN XL) 150 MG TB24 tablet     Sig: Take 1 tablet (150 mg total) by mouth once daily.     Dispense:  30 tablet     Refill:  1    losartan (COZAAR) 50 MG tablet     Sig: Take 1 tablet (50 mg total) by mouth once daily.     Dispense:  90 tablet     Refill:  2    metFORMIN (GLUCOPHAGE) 500 MG tablet     Sig: Take 1 tablet (500 mg total) by mouth daily with breakfast.     Dispense:  30 tablet     Refill:  3    pravastatin (PRAVACHOL) 20 MG tablet     Sig: Take 1 tablet (20 mg total) by mouth every evening.     Dispense:  90 tablet     Refill:  3

## 2019-02-18 NOTE — PATIENT INSTRUCTIONS
100 fl oz water daily   7.5 hours sleep nightly       Bupropion extended-release tablets (Depression/Mood Disorders)  What is this medicine?  BUPROPION (byoo PROE pee on) is used to treat depression.  How should I use this medicine?  Take this medicine by mouth with a glass of water. Follow the directions on the prescription label. You can take it with or without food. If it upsets your stomach, take it with food. Do not crush, chew, or cut these tablets. This medicine is taken once daily at the same time each day. Do not take your medicine more often than directed. Do not stop taking this medicine suddenly except upon the advice of your doctor. Stopping this medicine too quickly may cause serious side effects or your condition may worsen.  A special MedGuide will be given to you by the pharmacist with each prescription and refill. Be sure to read this information carefully each time.  Talk to your pediatrician regarding the use of this medicine in children. Special care may be needed.  What side effects may I notice from receiving this medicine?  Side effects that you should report to your doctor or health care professional as soon as possible:  · allergic reactions like skin rash, itching or hives, swelling of the face, lips, or tongue  · breathing problems  · changes in vision  · confusion  · fast or irregular heartbeat  · hallucinations  · increased blood pressure  · redness, blistering, peeling or loosening of the skin, including inside the mouth  · seizures  · suicidal thoughts or other mood changes  · unusually weak or tired  · vomiting  Side effects that usually do not require medical attention (report to your doctor or health care professional if they continue or are bothersome):  · change in sex drive or performance  · constipation  · headache  · loss of appetite  · nausea  · tremors  · weight loss  What may interact with this medicine?  Do not take this medicine with any of the following  medications:  · linezolid  · MAOIs like Azilect, Carbex, Eldepryl, Marplan, Nardil, and Parnate  · methylene blue (injected into a vein)  · other medicines that contain bupropion like Zyban  This medicine may also interact with the following medications:  · alcohol  · certain medicines for anxiety or sleep  · certain medicines for blood pressure like metoprolol, propranolol  · certain medicines for depression or psychotic disturbances  · certain medicines for HIV or AIDS like efavirenz, lopinavir, nelfinavir, ritonavir  · certain medicines for irregular heart beat like propafenone, flecainide  · certain medicines for Parkinson's disease like amantadine, levodopa  · certain medicines for seizures like carbamazepine, phenytoin, phenobarbital  · cimetidine  · clopidogrel  · cyclophosphamide  · furazolidone  · isoniazid  · nicotine  · orphenadrine  · procarbazine  · steroid medicines like prednisone or cortisone  · stimulant medicines for attention disorders, weight loss, or to stay awake  · tamoxifen  · theophylline  · thiotepa  · ticlopidine  · tramadol  · warfarin  What if I miss a dose?  If you miss a dose, skip the missed dose and take your next tablet at the regular time. Do not take double or extra doses.  Where should I keep my medicine?  Keep out of the reach of children.  Store at room temperature between 15 and 30 degrees C (59 and 86 degrees F). Throw away any unused medicine after the expiration date.  What should I tell my health care provider before I take this medicine?  They need to know if you have any of these conditions:  · an eating disorder, such as anorexia or bulimia  · bipolar disorder or psychosis  · diabetes or high blood sugar, treated with medication  · glaucoma  · head injury or brain tumor  · heart disease, previous heart attack, or irregular heart beat  · high blood pressure  · kidney or liver disease  · seizures (convulsions)  · suicidal thoughts or a previous suicide  attempt  · Tourette's syndrome  · weight loss  · an unusual or allergic reaction to bupropion, other medicines, foods, dyes, or preservatives  · breast-feeding  · pregnant or trying to become pregnant  What should I watch for while using this medicine?  Tell your doctor if your symptoms do not get better or if they get worse. Visit your doctor or health care professional for regular checks on your progress. Because it may take several weeks to see the full effects of this medicine, it is important to continue your treatment as prescribed by your doctor.  Patients and their families should watch out for new or worsening thoughts of suicide or depression. Also watch out for sudden changes in feelings such as feeling anxious, agitated, panicky, irritable, hostile, aggressive, impulsive, severely restless, overly excited and hyperactive, or not being able to sleep. If this happens, especially at the beginning of treatment or after a change in dose, call your health care professional.  Avoid alcoholic drinks while taking this medicine. Drinking large amounts of alcoholic beverages, using sleeping or anxiety medicines, or quickly stopping the use of these agents while taking this medicine may increase your risk for a seizure.  Do not drive or use heavy machinery until you know how this medicine affects you. This medicine can impair your ability to perform these tasks.  Do not take this medicine close to bedtime. It may prevent you from sleeping.  Your mouth may get dry. Chewing sugarless gum or sucking hard candy, and drinking plenty of water may help. Contact your doctor if the problem does not go away or is severe.  The tablet shell for some brands of this medicine does not dissolve. This is normal. The tablet shell may appear whole in the stool. This is not a cause for concern.  NOTE:This sheet is a summary. It may not cover all possible information. If you have questions about this medicine, talk to your doctor,  pharmacist, or health care provider. Copyright© 2017 [employer]

## 2019-02-21 ENCOUNTER — CLINICAL SUPPORT (OUTPATIENT)
Dept: SMOKING CESSATION | Facility: CLINIC | Age: 54
End: 2019-02-21
Payer: COMMERCIAL

## 2019-02-21 DIAGNOSIS — F17.200 NICOTINE DEPENDENCE: Primary | ICD-10-CM

## 2019-02-21 PROCEDURE — 99407 PR TOBACCO USE CESSATION INTENSIVE >10 MINUTES: ICD-10-PCS | Mod: S$GLB,,,

## 2019-02-21 PROCEDURE — 99407 BEHAV CHNG SMOKING > 10 MIN: CPT | Mod: S$GLB,,,

## 2019-02-21 NOTE — PROGRESS NOTES
Successful contact with patient regarding tobacco cessation quit #3. Pt states, she was unable to become tobacco free, she currently smoke two small cigars per day, and she is not ready to return to the program at this time.  Pt informed of her benefit status, future telephone follow ups, and contact information to schedule an appointment when she is ready. Will update the tobacco cessation smart form for 3-6 months on quit #3.

## 2019-02-25 ENCOUNTER — TELEPHONE (OUTPATIENT)
Dept: INTERNAL MEDICINE | Facility: CLINIC | Age: 54
End: 2019-02-25

## 2019-02-25 NOTE — TELEPHONE ENCOUNTER
What type of allergy shots? Is she seeing an allergist and needs follow up ?  Please schedule allergy

## 2019-02-25 NOTE — TELEPHONE ENCOUNTER
She doesn't know the name of the shot, not seeing allergist. But she did make an appointment with  for Wednesday at 1:20p.m.

## 2019-02-28 ENCOUNTER — TELEPHONE (OUTPATIENT)
Dept: INTERNAL MEDICINE | Facility: CLINIC | Age: 54
End: 2019-02-28

## 2019-02-28 DIAGNOSIS — J30.9 ALLERGIC RHINITIS, UNSPECIFIED SEASONALITY, UNSPECIFIED TRIGGER: Primary | ICD-10-CM

## 2019-02-28 NOTE — TELEPHONE ENCOUNTER
Spoke w/ pt to schedule allergy appt, but she will call us back when she gets her schedule.    ----- Message from Lo Luque sent at 2/28/2019 11:16 AM CST -----  Contact: KT GERARDO [[id number]]  Name of Who is Calling: KT GERARDO [[id number]]      What is the request in detail:  Patient called requesting to reschedule her appointment she missed on yesterday Wednesday 02/27/2019 with this office. I did attempt to reschedule per patient's request but was unsuccessful.     Please give a call back at your earliest convenience.      Thanks!      Can the clinic reply by [facility]:  no      Number to Call Back:  KT GERARDO / ph#

## 2019-04-01 ENCOUNTER — LAB VISIT (OUTPATIENT)
Dept: LAB | Facility: OTHER | Age: 54
End: 2019-04-01
Attending: FAMILY MEDICINE
Payer: MEDICAID

## 2019-04-01 ENCOUNTER — OFFICE VISIT (OUTPATIENT)
Dept: INTERNAL MEDICINE | Facility: CLINIC | Age: 54
End: 2019-04-01
Attending: FAMILY MEDICINE
Payer: MEDICAID

## 2019-04-01 VITALS
BODY MASS INDEX: 29.31 KG/M2 | OXYGEN SATURATION: 99 % | HEIGHT: 67 IN | DIASTOLIC BLOOD PRESSURE: 80 MMHG | HEART RATE: 66 BPM | SYSTOLIC BLOOD PRESSURE: 122 MMHG | WEIGHT: 186.75 LBS

## 2019-04-01 DIAGNOSIS — Z72.0 TOBACCO USE: ICD-10-CM

## 2019-04-01 DIAGNOSIS — E78.5 HYPERLIPIDEMIA, UNSPECIFIED HYPERLIPIDEMIA TYPE: ICD-10-CM

## 2019-04-01 DIAGNOSIS — R73.03 PREDIABETES: ICD-10-CM

## 2019-04-01 DIAGNOSIS — E66.9 OBESITY WITH SERIOUS COMORBIDITY, UNSPECIFIED CLASSIFICATION, UNSPECIFIED OBESITY TYPE: ICD-10-CM

## 2019-04-01 DIAGNOSIS — I10 ESSENTIAL HYPERTENSION: Primary | ICD-10-CM

## 2019-04-01 LAB
ALBUMIN SERPL BCP-MCNC: 3.6 G/DL (ref 3.5–5.2)
ALP SERPL-CCNC: 130 U/L (ref 55–135)
ALT SERPL W/O P-5'-P-CCNC: 13 U/L (ref 10–44)
ANION GAP SERPL CALC-SCNC: 8 MMOL/L (ref 8–16)
AST SERPL-CCNC: 17 U/L (ref 10–40)
BILIRUB SERPL-MCNC: 0.2 MG/DL (ref 0.1–1)
BUN SERPL-MCNC: 13 MG/DL (ref 6–20)
CALCIUM SERPL-MCNC: 10 MG/DL (ref 8.7–10.5)
CHLORIDE SERPL-SCNC: 105 MMOL/L (ref 95–110)
CHOLEST SERPL-MCNC: 253 MG/DL (ref 120–199)
CHOLEST/HDLC SERPL: 3.6 {RATIO} (ref 2–5)
CO2 SERPL-SCNC: 27 MMOL/L (ref 23–29)
CREAT SERPL-MCNC: 0.8 MG/DL (ref 0.5–1.4)
EST. GFR  (AFRICAN AMERICAN): >60 ML/MIN/1.73 M^2
EST. GFR  (NON AFRICAN AMERICAN): >60 ML/MIN/1.73 M^2
ESTIMATED AVG GLUCOSE: 134 MG/DL (ref 68–131)
GLUCOSE SERPL-MCNC: 93 MG/DL (ref 70–110)
HBA1C MFR BLD HPLC: 6.3 % (ref 4–5.6)
HDLC SERPL-MCNC: 71 MG/DL (ref 40–75)
HDLC SERPL: 28.1 % (ref 20–50)
LDLC SERPL CALC-MCNC: 164.8 MG/DL (ref 63–159)
NONHDLC SERPL-MCNC: 182 MG/DL
POTASSIUM SERPL-SCNC: 4.7 MMOL/L (ref 3.5–5.1)
PROT SERPL-MCNC: 7.7 G/DL (ref 6–8.4)
SODIUM SERPL-SCNC: 140 MMOL/L (ref 136–145)
TRIGL SERPL-MCNC: 86 MG/DL (ref 30–150)

## 2019-04-01 PROCEDURE — 80053 COMPREHEN METABOLIC PANEL: CPT

## 2019-04-01 PROCEDURE — 99999 PR PBB SHADOW E&M-EST. PATIENT-LVL III: CPT | Mod: PBBFAC,,, | Performed by: FAMILY MEDICINE

## 2019-04-01 PROCEDURE — 83036 HEMOGLOBIN GLYCOSYLATED A1C: CPT

## 2019-04-01 PROCEDURE — 99214 PR OFFICE/OUTPT VISIT, EST, LEVL IV, 30-39 MIN: ICD-10-PCS | Mod: S$PBB,,, | Performed by: FAMILY MEDICINE

## 2019-04-01 PROCEDURE — 99214 OFFICE O/P EST MOD 30 MIN: CPT | Mod: S$PBB,,, | Performed by: FAMILY MEDICINE

## 2019-04-01 PROCEDURE — 99213 OFFICE O/P EST LOW 20 MIN: CPT | Mod: PBBFAC | Performed by: FAMILY MEDICINE

## 2019-04-01 PROCEDURE — 36415 COLL VENOUS BLD VENIPUNCTURE: CPT

## 2019-04-01 PROCEDURE — 80061 LIPID PANEL: CPT

## 2019-04-01 PROCEDURE — 99999 PR PBB SHADOW E&M-EST. PATIENT-LVL III: ICD-10-PCS | Mod: PBBFAC,,, | Performed by: FAMILY MEDICINE

## 2019-04-01 NOTE — PROGRESS NOTES
"Subjective:      Patient ID: Radha Valle is a 54 y.o. female.    Chief Complaint: Follow-up    HPI   Patient here today for no side effects with metformin. She is getting about 4 bottles daily. She is not exercising. Smoking 2 cigars a day. She did not start the wellbutrin and is amenable to meeting with the smoking cessation counselor.     Breakfast  Boiled egg    Lunch   Turkey sandwich     Dinner  Chicken breast/salad      Review of Systems   Constitutional: Negative for activity change, appetite change, chills, diaphoresis, fatigue, fever and unexpected weight change.   HENT: Negative for congestion, ear discharge, ear pain, hearing loss, postnasal drip, rhinorrhea, sinus pressure and sore throat.    Respiratory: Negative for cough, shortness of breath and wheezing.    Cardiovascular: Negative for chest pain.   Gastrointestinal: Negative for abdominal pain, constipation, diarrhea, nausea and vomiting.   Genitourinary: Negative for dysuria and frequency.   Musculoskeletal: Negative.    Psychiatric/Behavioral: Negative for suicidal ideas.     I personally reviewed Past Medical History, Past Surgical history,  Past Social History and Family History      Objective:   /80 (BP Location: Left arm, Patient Position: Sitting)   Pulse 66   Ht 5' 7" (1.702 m)   Wt 84.7 kg (186 lb 11.7 oz)   SpO2 99%   BMI 29.25 kg/m²     Physical Exam   Constitutional: She is oriented to person, place, and time. She appears well-developed and well-nourished. No distress.   HENT:   Head: Normocephalic and atraumatic.   Right Ear: Hearing, tympanic membrane, external ear and ear canal normal.   Left Ear: Hearing, tympanic membrane, external ear and ear canal normal.   Nose: Nose normal.   Mouth/Throat: Uvula is midline and oropharynx is clear and moist. No oropharyngeal exudate.   Eyes: Pupils are equal, round, and reactive to light. Conjunctivae and EOM are normal. Right eye exhibits no discharge. Left eye exhibits no " discharge. No scleral icterus.   Neck: Normal range of motion. Neck supple.   Cardiovascular: Normal rate, regular rhythm, normal heart sounds and intact distal pulses. Exam reveals no gallop.   No murmur heard.  Pulmonary/Chest: Effort normal and breath sounds normal. No respiratory distress. She has no wheezes. She has no rales. She exhibits no tenderness.   Abdominal: Soft. Bowel sounds are normal. She exhibits no distension and no mass. There is no tenderness. There is no rebound and no guarding.   Neurological: She is alert and oriented to person, place, and time.   Skin: Skin is warm and dry.   Vitals reviewed.      1. Essential hypertension    2. Prediabetes    3. Hyperlipidemia, unspecified hyperlipidemia type    4. Tobacco use    5. Obesity with serious comorbidity, unspecified classification, unspecified obesity type        1. stable, cont current regimen   2. stable, cont current regimen   3. stable, cont current regimen, labs scheduled for today  4.referral placed  5. 9 pound weight loss in about 2 months, continue current eating habits     Orders Placed This Encounter   Procedures    Hemoglobin A1c    Ambulatory referral to Smoking Cessation Program

## 2019-04-02 ENCOUNTER — PATIENT MESSAGE (OUTPATIENT)
Dept: INTERNAL MEDICINE | Facility: CLINIC | Age: 54
End: 2019-04-02

## 2019-04-02 RX ORDER — PRAVASTATIN SODIUM 40 MG/1
40 TABLET ORAL DAILY
Qty: 30 TABLET | Refills: 3 | Status: SHIPPED | OUTPATIENT
Start: 2019-04-02 | End: 2021-03-01 | Stop reason: SDUPTHER

## 2019-04-02 NOTE — TELEPHONE ENCOUNTER
Bad cholesterol remains elevated we will need to change you pravastatin dose to 40 mg  Blood sugar control is worsening, please let me know if you would like me to increase your metformin or add a new medication

## 2019-04-03 NOTE — TELEPHONE ENCOUNTER
Spoke with pt and she had a verbal understanding of labs. She wants to do what Dr. Caro feels is best for blood glucose.

## 2019-04-10 ENCOUNTER — TELEPHONE (OUTPATIENT)
Dept: INTERNAL MEDICINE | Facility: CLINIC | Age: 54
End: 2019-04-10

## 2019-04-10 ENCOUNTER — TELEPHONE (OUTPATIENT)
Dept: SMOKING CESSATION | Facility: CLINIC | Age: 54
End: 2019-04-10

## 2019-04-10 NOTE — TELEPHONE ENCOUNTER
----- Message from Agus Perez sent at 4/10/2019  4:41 PM CDT -----  Contact: KT GERARDO [[id number]]  Type:  Patient Returning Call    Who Called:KT GERARDO [[id number]]    Who Left Message for Patient: Gladys Horan    Does the patient know what this is regarding?:N/A    Best Call Back Number:[phone]    Additional Information:

## 2019-04-10 NOTE — TELEPHONE ENCOUNTER
Telephoned patient regarding missed [facility] appointment.  Patient states she will call us back to reschedule.

## 2019-04-11 ENCOUNTER — OFFICE VISIT (OUTPATIENT)
Dept: INTERNAL MEDICINE | Facility: CLINIC | Age: 54
End: 2019-04-11
Attending: FAMILY MEDICINE
Payer: MEDICAID

## 2019-04-11 VITALS
SYSTOLIC BLOOD PRESSURE: 110 MMHG | DIASTOLIC BLOOD PRESSURE: 70 MMHG | HEART RATE: 61 BPM | HEIGHT: 67 IN | WEIGHT: 183.63 LBS | BODY MASS INDEX: 28.82 KG/M2 | TEMPERATURE: 99 F | OXYGEN SATURATION: 98 %

## 2019-04-11 DIAGNOSIS — I10 ESSENTIAL HYPERTENSION: ICD-10-CM

## 2019-04-11 DIAGNOSIS — R10.9 ABDOMINAL PAIN, UNSPECIFIED ABDOMINAL LOCATION: ICD-10-CM

## 2019-04-11 DIAGNOSIS — R73.03 PREDIABETES: Primary | ICD-10-CM

## 2019-04-11 DIAGNOSIS — E78.5 HYPERLIPIDEMIA, UNSPECIFIED HYPERLIPIDEMIA TYPE: ICD-10-CM

## 2019-04-11 PROCEDURE — 99214 PR OFFICE/OUTPT VISIT, EST, LEVL IV, 30-39 MIN: ICD-10-PCS | Mod: S$PBB,,, | Performed by: FAMILY MEDICINE

## 2019-04-11 PROCEDURE — 99999 PR PBB SHADOW E&M-EST. PATIENT-LVL III: ICD-10-PCS | Mod: PBBFAC,,, | Performed by: FAMILY MEDICINE

## 2019-04-11 PROCEDURE — 99214 OFFICE O/P EST MOD 30 MIN: CPT | Mod: S$PBB,,, | Performed by: FAMILY MEDICINE

## 2019-04-11 PROCEDURE — 99999 PR PBB SHADOW E&M-EST. PATIENT-LVL III: CPT | Mod: PBBFAC,,, | Performed by: FAMILY MEDICINE

## 2019-04-11 PROCEDURE — 99213 OFFICE O/P EST LOW 20 MIN: CPT | Mod: PBBFAC | Performed by: FAMILY MEDICINE

## 2019-04-11 RX ORDER — PANTOPRAZOLE SODIUM 40 MG/1
40 TABLET, DELAYED RELEASE ORAL DAILY
Qty: 30 TABLET | Refills: 0 | Status: SHIPPED | OUTPATIENT
Start: 2019-04-11 | End: 2019-09-05 | Stop reason: ALTCHOICE

## 2019-04-11 RX ORDER — SUCRALFATE 1 G/1
1 TABLET ORAL
Qty: 20 TABLET | Refills: 0 | Status: SHIPPED | OUTPATIENT
Start: 2019-04-11 | End: 2019-09-05 | Stop reason: ALTCHOICE

## 2019-04-11 NOTE — PATIENT INSTRUCTIONS
100 fl oz water daily     Take fiber supplements such as Metamucil, Citrucel, Konsyl, etc. (Benefiber is less effective so avoid)  The goal is 1-2 nonstraining nonloose bowel movements per day.  In general we recommend about 25-30 grams of fiber daily or reaching the above bowel movement goal.      Gastritis (Adult)    Gastritis is inflammation and irritation of the stomach lining. It can be present for a short time (acute) or be long lasting (chronic). Gastritis is often caused by infection with bacteria called H pylori. More than a third of people in the  have this bacteria in their bodies. In many cases, H pylori causes no problems or symptoms. In some people, though, the infection irritates the stomach lining and causes gastritis. Other causes of stomach irritation include drinking alcohol or taking pain-relieving medicines called NSAIDs (such as aspirin or ibuprofen).   Symptoms of gastritis can include:  · Abdominal pain or bloating  · Loss of appetite  · Nausea or vomiting  · Vomiting blood or having black stools  · Feeling more tired than usual  An inflamed and irritated stomach lining is more likely to develop a sore called an ulcer. To help prevent this, gastritis should be treated.  Home care  If needed, medicines may be prescribed. If you have H pylori infection, treating it will likely relieve your symptoms. Other changes can help reduce stomach irritation and help it heal.  · If you have been prescribed medicines for H pylori infection, take them as directed. Take all of the medicine until it is finished or your healthcare provider tells you to stop, even if you feel better.  · Your healthcare provider may recommend avoiding NSAIDs. If you take daily aspirin for your heart or other medical reasons, do not stop without talking to your healthcare provider first.  · Avoid drinking alcohol.  · Stop smoking. Smoking can irritate the stomach and delay healing. As much as possible, stay away from second  hand smoke.  Follow-up care  Follow up with your healthcare provider, or as advised by our staff. Testing may be needed to check for inflammation or an ulcer.  When to seek medical advice  Call your healthcare provider for any of the following:  · Stomach pain that gets worse or moves to the lower right abdomen (appendix area)  · Chest pain that appears or gets worse, or spreads to the back, neck, shoulder, or arm  · Frequent vomiting (cant keep down liquids)  · Blood in the stool or vomit (red or black in color)  · Feeling weak or dizzy  · Fever of 100.4ºF (38ºC) or higher, or as directed by your healthcare provider  Date Last Reviewed: 6/22/2015  © 7542-4152 [employer]. [address] [place] [address], [place] [address]. All rights reserved. This information is not intended as a substitute for professional medical care. Always follow your healthcare professional's instructions.        Treating Gastritis     Take your medicines as directed, even if your stomach pain goes away.    A medical evaluation will be done to find out the cause of your symptoms. The evaluation may include your health history, a physical exam, and some tests. Once your evaluation is done, treatment can begin. It may include taking certain medicines and making some lifestyle changes. Follow your healthcare providers advice.  Taking medicines  Your healthcare providers may prescribe some medicines to neutralize or reduce excess stomach acids. If tests show that H. pylori are in your stomach lining, antibiotics may be prescribed. H.pylori are a type of bacteria that can cause gastritis.  Avoiding certain things  Be sure to avoid:  · Aspirin. Avoid taking aspirin and other anti-inflammatory medicines, such as ibuprofen. They can irritate your stomach lining. Also, check with your healthcare provider before taking or stopping any medicines.  · Spicy foods and caffeine. Stay away from foods prepared with spices, especially black pepper.  Caffeine can also make your symptoms worse. So, avoid coffee, tea, cola drinks, and chocolate. Be sure to tell your healthcare provider about any other foods or liquids that bother your stomach.  · Tobacco and alcohol. Dont use tobacco or drink alcohol. Tobacco and alcohol can increase stomach acids and worsen your gastritis symptoms.  Reducing your stress  Stress may make your gastritis symptoms worse. Whenever you can, reduce the stress in your life. One way to do this is to start an exercise program--talk to your healthcare provider first. Also, try to get enough sleep, at least 8 hours a night.  Date Last Reviewed: 7/1/2016  © 8185-5364 [employer]. [address] [place] [address], [place] [address]. All rights reserved. This information is not intended as a substitute for professional medical care. Always follow your healthcare professional's instructions.

## 2019-04-11 NOTE — PROGRESS NOTES
"Subjective:      Patient ID: Radha Valle is a 54 y.o. female.    Chief Complaint: Abdominal Pain    HPI   Patient here today for follow up. She takes pravastatin every other day just because she forgets. She wants to take metformin twice a day to help with sugars. She feels abdominal pain in the upper side and does have nausea with it and this started 4 days ago. She has tried marty seltzer which has helped. The day it started she had a strawberry shake from [employer] and had some alcohol and the alcohol made it worse. She also had some loose stools with it. Her last bowel movement was 4 days ago and typically she a bowel movement once a week. No vomiting.  She is passing gas. She denies pain with urination, blood in the urine or vaginal discharge.       Review of Systems   Constitutional: Negative for activity change, appetite change, chills, diaphoresis, fatigue, fever and unexpected weight change.   HENT: Negative for congestion, ear discharge, ear pain, hearing loss, postnasal drip, rhinorrhea, sinus pressure and sore throat.    Respiratory: Negative for cough, shortness of breath and wheezing.    Cardiovascular: Negative for chest pain.   Gastrointestinal: Positive for abdominal pain and constipation. Negative for diarrhea, nausea and vomiting.   Genitourinary: Negative for dysuria and frequency.   Musculoskeletal: Negative.    Psychiatric/Behavioral: Negative for suicidal ideas.     I personally reviewed Past Medical History, Past Surgical history,  Past Social History and Family History      Objective:   /70 (BP Location: Left arm, Patient Position: Sitting)   Pulse 61   Temp 98.9 °F (37.2 °C)   Ht 5' 7" (1.702 m)   Wt 83.3 kg (183 lb 10.3 oz)   SpO2 98%   BMI 28.76 kg/m²     Physical Exam   Constitutional: She is oriented to person, place, and time. She appears well-developed and well-nourished. No distress.   HENT:   Head: Normocephalic and atraumatic.   Right Ear: Hearing, tympanic " membrane, external ear and ear canal normal.   Left Ear: Hearing, tympanic membrane, external ear and ear canal normal.   Nose: Nose normal.   Mouth/Throat: Uvula is midline and oropharynx is clear and moist. No oropharyngeal exudate.   Eyes: Pupils are equal, round, and reactive to light. Conjunctivae and EOM are normal. Right eye exhibits no discharge. Left eye exhibits no discharge. No scleral icterus.   Neck: Normal range of motion. Neck supple.   Cardiovascular: Normal rate, regular rhythm, normal heart sounds and intact distal pulses. Exam reveals no gallop.   No murmur heard.  Pulmonary/Chest: Effort normal and breath sounds normal. No respiratory distress. She has no wheezes. She has no rales. She exhibits no tenderness.   Abdominal: Soft. Bowel sounds are normal. She exhibits no distension and no mass. There is no tenderness. There is no rebound and no guarding.   Neurological: She is alert and oriented to person, place, and time.   Skin: Skin is warm and dry.   Vitals reviewed.      1. Prediabetes    2. Essential hypertension    3. Hyperlipidemia, unspecified hyperlipidemia type    4. Abdominal pain, unspecified abdominal location        1. Metformin increased to BID   2. stable, cont current regimen   3. Take pravastatin regularly, recheck in 3 months  4. Concern for gastritis vs constipation, trial 2 weeks protonix, increase fiber, water, miralax  -stop alcohol, caffeine, spicy, greasy and fried foods  Call if no improvement or worsening of symptoms   consider GI follow up if persists    Orders Placed This Encounter   Procedures    Hemoglobin A1c    Lipid panel     Medications Ordered This Encounter   Medications    pantoprazole (PROTONIX) 40 MG tablet     Sig: Take 1 tablet (40 mg total) by mouth once daily.     Dispense:  30 tablet     Refill:  0    sucralfate (CARAFATE) 1 gram tablet     Sig: Take 1 tablet (1 g total) by mouth 4 (four) times daily before meals and nightly.     Dispense:  20 tablet      Refill:  0

## 2019-05-02 RX ORDER — METFORMIN HYDROCHLORIDE 500 MG/1
500 TABLET ORAL 2 TIMES DAILY WITH MEALS
Qty: 180 TABLET | Refills: 3 | Status: SHIPPED | OUTPATIENT
Start: 2019-05-02 | End: 2019-12-05

## 2019-05-02 NOTE — TELEPHONE ENCOUNTER
Pt is in waiting room requesting that metformin orders be changed due to insurance not covering it  The insurance will cover it if it says take 2 tablet (1000 mg total) by mouth daily with breakfast.  msg routed to MD

## 2019-06-19 RX ORDER — IRBESARTAN 150 MG/1
150 TABLET ORAL NIGHTLY
Qty: 90 TABLET | Refills: 3 | Status: SHIPPED | OUTPATIENT
Start: 2019-06-19 | End: 2020-07-31 | Stop reason: SDUPTHER

## 2019-07-09 NOTE — TELEPHONE ENCOUNTER
Spoke with pt and scheduled appt with PCP for tomorrow.    ----- Message from Sophia Valentine sent at 4/10/2019  4:31 PM CDT -----  Contact: Self            Name of Who is Calling: KT GERARDO [[id number]]      What is the request in detail: Pt states she is experiencing extreme stomach pain/dizziness. Pt is established with Dr. Caro. No slots due to Medicaid insurance. Please contact to further discuss and advise.        Can the clinic reply by [facility]:  N      What Number to Call Back if not in [facility]: [phone]                                      
done

## 2019-08-13 ENCOUNTER — TELEPHONE (OUTPATIENT)
Dept: INTERNAL MEDICINE | Facility: CLINIC | Age: 54
End: 2019-08-13

## 2019-08-13 NOTE — TELEPHONE ENCOUNTER
Spoke to Ms. Valle and confirmed appt date and time.  Patient states understanding with no further questions.

## 2019-08-14 ENCOUNTER — TELEPHONE (OUTPATIENT)
Dept: OBSTETRICS AND GYNECOLOGY | Facility: CLINIC | Age: 54
End: 2019-08-14

## 2019-08-14 ENCOUNTER — OFFICE VISIT (OUTPATIENT)
Dept: PRIMARY CARE CLINIC | Facility: CLINIC | Age: 54
End: 2019-08-14
Attending: FAMILY MEDICINE
Payer: MEDICAID

## 2019-08-14 ENCOUNTER — LAB VISIT (OUTPATIENT)
Dept: LAB | Facility: HOSPITAL | Age: 54
End: 2019-08-14
Attending: FAMILY MEDICINE
Payer: MEDICAID

## 2019-08-14 VITALS
WEIGHT: 186.38 LBS | SYSTOLIC BLOOD PRESSURE: 138 MMHG | OXYGEN SATURATION: 98 % | BODY MASS INDEX: 29.25 KG/M2 | HEIGHT: 67 IN | HEART RATE: 65 BPM | DIASTOLIC BLOOD PRESSURE: 80 MMHG

## 2019-08-14 DIAGNOSIS — R73.03 PREDIABETES: ICD-10-CM

## 2019-08-14 DIAGNOSIS — I10 ESSENTIAL HYPERTENSION: Primary | ICD-10-CM

## 2019-08-14 DIAGNOSIS — E78.5 HYPERLIPIDEMIA, UNSPECIFIED HYPERLIPIDEMIA TYPE: ICD-10-CM

## 2019-08-14 DIAGNOSIS — I10 ESSENTIAL HYPERTENSION: ICD-10-CM

## 2019-08-14 DIAGNOSIS — Z12.31 BREAST CANCER SCREENING BY MAMMOGRAM: Primary | ICD-10-CM

## 2019-08-14 DIAGNOSIS — J02.9 SORE THROAT: ICD-10-CM

## 2019-08-14 DIAGNOSIS — J32.9 SINUSITIS, UNSPECIFIED CHRONICITY, UNSPECIFIED LOCATION: ICD-10-CM

## 2019-08-14 LAB
CHOLEST SERPL-MCNC: 215 MG/DL (ref 120–199)
CHOLEST/HDLC SERPL: 3.1 {RATIO} (ref 2–5)
ESTIMATED AVG GLUCOSE: 126 MG/DL (ref 68–131)
HBA1C MFR BLD HPLC: 6 % (ref 4–5.6)
HDLC SERPL-MCNC: 69 MG/DL (ref 40–75)
HDLC SERPL: 32.1 % (ref 20–50)
LDLC SERPL CALC-MCNC: 111.8 MG/DL (ref 63–159)
NONHDLC SERPL-MCNC: 146 MG/DL
TRIGL SERPL-MCNC: 171 MG/DL (ref 30–150)

## 2019-08-14 PROCEDURE — 99214 PR OFFICE/OUTPT VISIT, EST, LEVL IV, 30-39 MIN: ICD-10-PCS | Mod: S$PBB,,, | Performed by: FAMILY MEDICINE

## 2019-08-14 PROCEDURE — 99999 PR PBB SHADOW E&M-EST. PATIENT-LVL III: CPT | Mod: PBBFAC,,, | Performed by: FAMILY MEDICINE

## 2019-08-14 PROCEDURE — 99999 PR PBB SHADOW E&M-EST. PATIENT-LVL III: ICD-10-PCS | Mod: PBBFAC,,, | Performed by: FAMILY MEDICINE

## 2019-08-14 PROCEDURE — 80061 LIPID PANEL: CPT

## 2019-08-14 PROCEDURE — 99213 OFFICE O/P EST LOW 20 MIN: CPT | Mod: PBBFAC,PN | Performed by: FAMILY MEDICINE

## 2019-08-14 PROCEDURE — 83036 HEMOGLOBIN GLYCOSYLATED A1C: CPT

## 2019-08-14 PROCEDURE — 36415 COLL VENOUS BLD VENIPUNCTURE: CPT | Mod: PN

## 2019-08-14 PROCEDURE — 99214 OFFICE O/P EST MOD 30 MIN: CPT | Mod: S$PBB,,, | Performed by: FAMILY MEDICINE

## 2019-08-14 RX ORDER — AMOXICILLIN 875 MG/1
875 TABLET, FILM COATED ORAL EVERY 12 HOURS
Qty: 20 TABLET | Refills: 0 | Status: SHIPPED | OUTPATIENT
Start: 2019-08-14 | End: 2019-09-05 | Stop reason: ALTCHOICE

## 2019-08-14 RX ORDER — BENZONATATE 100 MG/1
100 CAPSULE ORAL 3 TIMES DAILY PRN
Qty: 30 CAPSULE | Refills: 0 | Status: SHIPPED | OUTPATIENT
Start: 2019-08-14 | End: 2019-08-24

## 2019-08-14 NOTE — LETTER
August 14, 2019    Radha Valle  [address]  [place] [address]         [facility] [facility]  [address] [facility] [address]  Phone: [phone]  Fax: [phone] August 14, 2019     Patient: Radha Valle   YOB: 1965   Date of Visit: 8/14/2019       To Whom It May Concern:    Patient seen in clinic, please excuse from missed days of work.     If you have any questions or concerns, please don't hesitate to call.    Sincerely,        Melba Caro MD

## 2019-08-14 NOTE — TELEPHONE ENCOUNTER
----- Message from Carol Casanova sent at 8/14/2019 12:56 PM CDT -----  Contact: KT GERARDO [[id number]]  Name of Who is Calling: KT GERARDO [[id number]]    What is the request in detail: Patient is requesting annual exam and mammogram orders... Please contact to further discuss and advise      Can the clinic reply by [facility]: No    What Number to Call Back if not in [facility]: [phone]

## 2019-08-14 NOTE — PROGRESS NOTES
"Subjective:      Patient ID: Radha Valle is a 54 y.o. female.    Chief Complaint: Diabetes (f/u) and Sinus Problem    HPI   Patient here today for 5 days of dry cough worse in the morning, left sided sinus pressure, generalized headache. She has tried nasal sprays and ibuprofen. She denies fevers, sore throat, wheezing, sob, neck stiffness. No sick contacts, travel or camping prior to onset.     Review of Systems   Constitutional: Negative.    HENT: Positive for congestion, sinus pressure and sinus pain.    Respiratory: Positive for cough. Negative for shortness of breath.    Cardiovascular: Negative.    Gastrointestinal: Negative.    Genitourinary: Negative.    Neurological: Negative.      I personally reviewed Past Medical History, Past Surgical history,  Past Social History and Family History      Objective:   /80   Pulse 65   Ht 5' 7" (1.702 m)   Wt 84.5 kg (186 lb 6.4 oz)   SpO2 98%   BMI 29.19 kg/m²     Physical Exam   Constitutional: She is oriented to person, place, and time. She appears well-developed and well-nourished. No distress.   HENT:   Head: Normocephalic and atraumatic.   Right Ear: Hearing, tympanic membrane, external ear and ear canal normal.   Left Ear: Hearing, tympanic membrane, external ear and ear canal normal.   Nose: Mucosal edema present. Right sinus exhibits no maxillary sinus tenderness and no frontal sinus tenderness. Left sinus exhibits maxillary sinus tenderness. Left sinus exhibits no frontal sinus tenderness.   Mouth/Throat: Uvula is midline and oropharynx is clear and moist. No oropharyngeal exudate.   Eyes: Pupils are equal, round, and reactive to light. Conjunctivae and EOM are normal. Right eye exhibits no discharge. Left eye exhibits no discharge. No scleral icterus.   Neck: Normal range of motion. Neck supple.   Cardiovascular: Normal rate, regular rhythm, normal heart sounds and intact distal pulses. Exam reveals no gallop.   No murmur " heard.  Pulmonary/Chest: Effort normal and breath sounds normal. No respiratory distress. She has no wheezes. She has no rales. She exhibits no tenderness.   Abdominal: Soft. Bowel sounds are normal. There is no rebound.   Neurological: She is alert and oriented to person, place, and time.   Vitals reviewed.      1. Essential hypertension    2. Prediabetes    3. Sinusitis, unspecified chronicity, unspecified location    4. Sore throat        1. stable, cont current regimen   2. stable, cont metformin, check hga1c  3. Amoxicillin, tessalon, call if no improvement   4. F/u throat culture        Orders Placed This Encounter   Procedures    Strep A culture, throat     Medications Ordered This Encounter   Medications    amoxicillin (AMOXIL) 875 MG tablet     Sig: Take 1 tablet (875 mg total) by mouth every 12 (twelve) hours.     Dispense:  20 tablet     Refill:  0    benzonatate (TESSALON) 100 MG capsule     Sig: Take 1 capsule (100 mg total) by mouth 3 (three) times daily as needed.     Dispense:  30 capsule     Refill:  0

## 2019-09-03 ENCOUNTER — PATIENT OUTREACH (OUTPATIENT)
Dept: ADMINISTRATIVE | Facility: OTHER | Age: 54
End: 2019-09-03

## 2019-09-03 DIAGNOSIS — Z12.11 ENCOUNTER FOR FIT (FECAL IMMUNOCHEMICAL TEST) SCREENING: Primary | ICD-10-CM

## 2019-09-05 ENCOUNTER — OFFICE VISIT (OUTPATIENT)
Dept: OBSTETRICS AND GYNECOLOGY | Facility: CLINIC | Age: 54
End: 2019-09-05
Attending: OBSTETRICS & GYNECOLOGY
Payer: MEDICAID

## 2019-09-05 ENCOUNTER — HOSPITAL ENCOUNTER (OUTPATIENT)
Dept: RADIOLOGY | Facility: OTHER | Age: 54
Discharge: HOME | End: 2019-09-05
Attending: OBSTETRICS & GYNECOLOGY
Payer: MEDICAID

## 2019-09-05 VITALS
BODY MASS INDEX: 29.58 KG/M2 | HEIGHT: 66 IN | SYSTOLIC BLOOD PRESSURE: 120 MMHG | DIASTOLIC BLOOD PRESSURE: 80 MMHG | WEIGHT: 184.06 LBS

## 2019-09-05 DIAGNOSIS — Z12.31 BREAST CANCER SCREENING BY MAMMOGRAM: ICD-10-CM

## 2019-09-05 DIAGNOSIS — Z01.419 WELL FEMALE EXAM WITH ROUTINE GYNECOLOGICAL EXAM: Primary | ICD-10-CM

## 2019-09-05 PROCEDURE — 99999 PR PBB SHADOW E&M-EST. PATIENT-LVL III: CPT | Mod: PBBFAC,,, | Performed by: OBSTETRICS & GYNECOLOGY

## 2019-09-05 PROCEDURE — 99999 PR PBB SHADOW E&M-EST. PATIENT-LVL III: ICD-10-PCS | Mod: PBBFAC,,, | Performed by: OBSTETRICS & GYNECOLOGY

## 2019-09-05 PROCEDURE — 99213 OFFICE O/P EST LOW 20 MIN: CPT | Mod: PBBFAC | Performed by: OBSTETRICS & GYNECOLOGY

## 2019-09-05 PROCEDURE — 99396 PR PREVENTIVE VISIT,EST,40-64: ICD-10-PCS | Mod: S$PBB,,, | Performed by: OBSTETRICS & GYNECOLOGY

## 2019-09-05 PROCEDURE — 77063 MAMMO DIGITAL SCREENING BILAT WITH TOMOSYNTHESIS_CAD: ICD-10-PCS | Mod: 26,,, | Performed by: RADIOLOGY

## 2019-09-05 PROCEDURE — 77067 SCR MAMMO BI INCL CAD: CPT | Mod: TC

## 2019-09-05 PROCEDURE — 99396 PREV VISIT EST AGE 40-64: CPT | Mod: S$PBB,,, | Performed by: OBSTETRICS & GYNECOLOGY

## 2019-09-05 PROCEDURE — 77063 BREAST TOMOSYNTHESIS BI: CPT | Mod: 26,,, | Performed by: RADIOLOGY

## 2019-09-05 PROCEDURE — 77067 MAMMO DIGITAL SCREENING BILAT WITH TOMOSYNTHESIS_CAD: ICD-10-PCS | Mod: 26,,, | Performed by: RADIOLOGY

## 2019-09-05 PROCEDURE — 77067 SCR MAMMO BI INCL CAD: CPT | Mod: 26,,, | Performed by: RADIOLOGY

## 2019-09-06 NOTE — PROGRESS NOTES
SUBJECTIVE:   54 y.o. female   for routine gyn exam. Patient's last menstrual period was 2018 (approximate)..  She has no unusual complaints.  No PMB.  No HRT.         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   Social Needs    Financial resource strain: Not on file    Food insecurity:     Worry: Not on file     Inability: Not on file    Transportation needs:     Medical: Not on file     Non-medical: Not on file   Tobacco Use    Smoking status: Former Smoker     Packs/day: 0.50     Years: 30.00     Pack years: 15.00     Types: Cigarettes     Last attempt to quit: 10/17/2018     Years since quittin.8    Smokeless tobacco: Former User   Substance and Sexual Activity    Alcohol use: Yes     Alcohol/week: 0.0 oz     Comment: social    Drug use: No    Sexual activity: Yes     Partners: Male     Birth control/protection: Surgical     Comment: Tubal ligation   Lifestyle    Physical activity:     Days per week: Not on file     Minutes per session: Not on file    Stress: Not on file   Relationships    Social connections:     Talks on phone: Not on file     Gets together: Not on file     Attends Faith service: Not on file     Active member of club or organization: Not on file     Attends meetings of clubs or organizations: Not on file     Relationship status: Not on file   Other Topics Concern    Not on file   Social History Narrative    Pt's son lives with her.   He has autism and is 20 yrs old.  Another son also had autism and is .       Family History   Problem Relation Age of Onset    Diabetes Mother     Hyperlipidemia Mother     Hypertension Mother     COPD Father     Diabetes Sister     Alcohol abuse Brother     Cancer Brother         liver    Diabetes Sister     Diabetes Sister     Hypertension Brother     Hypertension Brother     Stroke Brother     Breast cancer Cousin     Colon cancer Neg Hx     Ovarian cancer Neg Hx      OB History    Para Term  AB Living   4 3 3   1 3   SAB TAB Ectopic Multiple Live Births           3      # Outcome Date GA Lbr Paul/2nd Weight Sex Delivery Anes PTL Lv   4 AB            3 Term            2 Term            1 Term                  Current Outpatient Medications   Medication Sig Dispense Refill    irbesartan (AVAPRO) 150 MG tablet Take 1 tablet (150 mg total) by mouth every evening. 90 tablet 3    metFORMIN (GLUCOPHAGE) 500 MG tablet Take 1 tablet (500 mg total) by mouth 2 (two) times daily with meals. 180 tablet 3    pravastatin (PRAVACHOL) 40 MG tablet Take 1 tablet (40 mg total) by mouth once daily. 30 tablet 3     No current facility-administered medications for this visit.      Allergies: Patient has no known allergies.     ROS:  Constitutional: no weight loss, weight gain, fever, fatigue  Eyes:  No vision changes, glasses/contacts  ENT/Mouth: No ulcers, sinus problems, ears ringing, headache  Cardiovascular: No inability to lie flat, chest pain, exercise intolerance, swelling, heart palpitations  Respiratory: No wheezing, coughing blood, shortness of breath, or cough  Gastrointestinal: No diarrhea, bloody stool, nausea/vomiting, constipation, gas, hemorrhoids  Genitourinary: No blood in urine, painful urination, urgency of urination, frequency of urination, incomplete emptying, incontinence, abnormal bleeding, painful periods, heavy periods, vaginal discharge, vaginal odor, painful  "intercourse, sexual problems, bleeding after intercourse.  Musculoskeletal: No muscle weakness  Skin/Breast: No painful breasts, nipple discharge, masses, rash, ulcers  Neurological: No passing out, seizures, numbness, headache  Endocrine: No diabetes, hypothyroid, hyperthyroid, hot flashes, hair loss, abnormal hair growth, ance  Psychiatric: No depression, crying  Hematologic: No bruises, bleeding, swollen lymph nodes, anemia.      OBJECTIVE:   The patient appears well, alert, oriented x 3, in no distress.  /80 (BP Location: Right arm, Patient Position: Sitting, BP Method: Medium (Manual))   Ht 5' 6" (1.676 m)   Wt 83.5 kg (184 lb 1.4 oz)   LMP 08/16/2018 (Approximate)   BMI 29.71 kg/m²   NECK: no thyromegaly, trachea midline  SKIN: no acne, striae, hirsutism  CHEST: CTAB  CV: RRR  BREAST EXAM: breasts appear normal, no suspicious masses, no skin or nipple changes or axillary nodes  ABDOMEN: no hernias, masses, or hepatosplenomegaly  GENITALIA: normal external genitalia, no erythema, no discharge  URETHRA: normal urethra, normal urethral meatus  VAGINA: Normal  CERVIX: no lesions or cervical motion tenderness  UTERUS: normal size, contour, position, consistency, mobility, non-tender  ADNEXA: no mass, fullness, tenderness      ASSESSMENT:   1. Well female exam with routine gynecological exam         PLAN:   Discussed menopause.     Return to clinic in 1 year  "

## 2019-10-11 ENCOUNTER — TELEPHONE (OUTPATIENT)
Dept: INTERNAL MEDICINE | Facility: CLINIC | Age: 54
End: 2019-10-11

## 2019-10-11 NOTE — TELEPHONE ENCOUNTER
Spoke to MsPavel Leonard to confirm the her irbesartan was discontinued.  Informed the Patient that the Medication is still under the medication list.

## 2019-10-11 NOTE — TELEPHONE ENCOUNTER
----- Message from Maile Winn sent at 10/11/2019  9:37 AM CDT -----  Contact: KT GERARDO [[id number]]  Name of Who is Calling: HUMAIRA,KT PANIAGUA [[id number]]    What is the request in detail: Would like to inform provider that her blood pressure medication has been discontinued and she only has one pill left. Patient would like to know if she can get a new prescription or if she needs to be seen. Please contact to further discuss and advise      Can the clinic reply by [facility]: no    [facility]: [phone]

## 2019-12-05 ENCOUNTER — TELEPHONE (OUTPATIENT)
Dept: INTERNAL MEDICINE | Facility: CLINIC | Age: 54
End: 2019-12-05

## 2019-12-05 ENCOUNTER — OFFICE VISIT (OUTPATIENT)
Dept: INTERNAL MEDICINE | Facility: CLINIC | Age: 54
End: 2019-12-05
Attending: FAMILY MEDICINE
Payer: MEDICAID

## 2019-12-05 VITALS
SYSTOLIC BLOOD PRESSURE: 120 MMHG | HEIGHT: 67 IN | WEIGHT: 196.44 LBS | BODY MASS INDEX: 30.83 KG/M2 | HEART RATE: 72 BPM | OXYGEN SATURATION: 98 % | DIASTOLIC BLOOD PRESSURE: 82 MMHG

## 2019-12-05 DIAGNOSIS — R73.03 PREDIABETES: ICD-10-CM

## 2019-12-05 DIAGNOSIS — K59.00 CONSTIPATION, UNSPECIFIED CONSTIPATION TYPE: ICD-10-CM

## 2019-12-05 DIAGNOSIS — H11.30 SUBCONJUNCTIVAL HEMORRHAGE, UNSPECIFIED LATERALITY: Primary | ICD-10-CM

## 2019-12-05 PROCEDURE — 99999 PR PBB SHADOW E&M-EST. PATIENT-LVL III: CPT | Mod: PBBFAC,,, | Performed by: FAMILY MEDICINE

## 2019-12-05 PROCEDURE — 99999 PR PBB SHADOW E&M-EST. PATIENT-LVL III: ICD-10-PCS | Mod: PBBFAC,,, | Performed by: FAMILY MEDICINE

## 2019-12-05 PROCEDURE — 99213 OFFICE O/P EST LOW 20 MIN: CPT | Mod: PBBFAC | Performed by: FAMILY MEDICINE

## 2019-12-05 PROCEDURE — 99213 PR OFFICE/OUTPT VISIT, EST, LEVL III, 20-29 MIN: ICD-10-PCS | Mod: S$PBB,,, | Performed by: FAMILY MEDICINE

## 2019-12-05 PROCEDURE — 99213 OFFICE O/P EST LOW 20 MIN: CPT | Mod: S$PBB,,, | Performed by: FAMILY MEDICINE

## 2019-12-05 NOTE — PATIENT INSTRUCTIONS
Exenatide injection suspension, extended-release  What is this medicine?  EXENATIDE (ex EN a tide) is used to improve blood sugar control in adults with type 2 diabetes. This medicine may be used with other oral diabetes medicines.  How should I use this medicine?  This medicine is for injection under the skin of your upper leg, stomach area, or upper arm. It is usually given once every week (every 7 days). You will be taught how to prepare and give this medicine. Use exactly as directed. Take your medicine at regular intervals. Do not take it more often than directed.  It is important that you put your used needles and syringes in a special sharps container. Do not put them in a trash can. If you do not have a sharps container, call your pharmacist or healthcare provider to get one.  A special MedGuide will be given to you by the pharmacist with each prescription and refill. Be sure to read this information carefully each time.  Talk to your pediatrician regarding the use of this medicine in children. Special care may be needed.  What side effects may I notice from receiving this medicine?  Side effects that you should report to your doctor or health care professional as soon as possible:  · allergic reactions like skin rash, itching or hives, swelling of the face, lips, or tongue  · breathing problems  · signs and symptoms of low blood sugar such as feeling anxious, confusion, dizziness, increased hunger, unusually weak or tired, sweating, shakiness, cold, irritable, headache, blurred vision, fast heartbeat, loss of consciousness  · swelling of the ankles, feet, hands  · trouble passing urine or change in the amount of urine  · unusual stomach pain or upset  · unusually weak or tired  · vomiting  Side effects that usually do not require medical attention (report to your doctor or health care professional if they continue or are  bothersome):  · constipation  · diarrhea  · dizziness  · headache  · heartburn  · itching, irritation, or redness at site where injected.  · nausea  What may interact with this medicine?  Do not take this medicine with any of the following medications:  · gatifloxacin  This medicine may also interact with the following medications:  · acetaminophen  · birth control pills  · digoxin  · lisinopril  · lovastatin  · sulfonylureas  · warfarin  Many medications may cause changes in blood sugar, these include:  · alcohol containing beverages  · aspirin and aspirin-like drugs  · chloramphenicol  · chromium  · diuretics  · female hormones, such as estrogens or progestins, birth control pills  · heart medicines  · isoniazid  · male hormones or anabolic steroids  · medications for weight loss  · medicines for allergies, asthma, cold, or cough  · medicines for mental problems  · medicines called MAO inhibitors - Nardil, Parnate, Marplan, Eldepryl  · niacin  · NSAIDS, such as ibuprofen  · pentamidine  · phenytoin  · probenecid  · quinolone antibiotics such as ciprofloxacin, levofloxacin, ofloxacin  · some herbal dietary supplements  · steroid medicines such as prednisone or cortisone  · thyroid hormones  Some medications can hide the warning symptoms of low blood sugar (hypoglycemia). You may need to monitor your blood sugar more closely if you are taking one of these medications. These include:  · beta-blockers, often used for high blood pressure or heart problems (examples include atenolol, metoprolol, propranolol)  · clonidine  · guanethidine  · reserpine  What if I miss a dose?  If you miss a dose, take it as soon as you can, provided your next usual scheduled dose is due at least 3 days later. If you miss a dose and your next usual scheduled dose is due 1 or 2 days later, then do not take the missed dose. Take the next dose at your regular time. Do not take double or extra doses. If you have questions about a missed dose,  contact your health care provider for advice.  Where should I keep my medicine?  Keep out of the reach of children.  Store this medicine in a refrigerator between 2 and 8 degrees C (36 and 46 degrees F). Do not freeze or use if the medicine has been frozen. Protect from light and excessive heat. Each single-dose tray can be kept at a temperature not to exceed 25 degrees C (77 degrees F) for no more than a total of 4 weeks, if needed. Throw away any unused medicine after the expiration date.  What should I tell my health care provider before I take this medicine?  They need to know if you have any of these conditions:  · endocrine tumors (MEN 2) or if someone in your family had these tumors  · history of pancreatitis  · kidney disease or if you are on dialysis  · stomach problems  · thyroid cancer or if someone in your family had thyroid cancer  · an unusual or allergic reaction to exenatide, medicines, foods, dyes, or preservatives  · pregnant or trying to get pregnant  · breast-feeding  What should I watch for while using this medicine?  Visit your doctor or health care professional for regular checks on your progress.  A test called the HbA1C (A1C) will be monitored. This is a simple blood test. It measures your blood sugar control over the last 2 to 3 months. You will receive this test every 3 to 6 months.  Learn how to check your blood sugar. Learn the symptoms of low and high blood sugar and how to manage them.  Always carry a quick-source of sugar with you in case you have symptoms of low blood sugar. Examples include hard sugar candy or glucose tablets. Make sure others know that you can choke if you eat or drink when you develop serious symptoms of low blood sugar, such as seizures or unconsciousness. They must get medical help at once.  Tell your doctor or health care professional if you have high blood sugar. You might need to change the dose of your medicine. If you are sick or exercising more than usual,  you might need to change the dose of your medicine.  Do not skip meals. Ask your doctor or health care professional if you should avoid alcohol. Many nonprescription cough and cold products contain sugar or alcohol. These can affect blood sugar.  Exenatide pens and cartridges should never be shared. Even if the needle is changed, sharing may result in passing of viruses like hepatitis or HIV.  Wear a medical ID bracelet or chain, and carry a card that describes your disease and details of your medicine and dosage times.  NOTE:This sheet is a summary. It may not cover all possible information. If you have questions about this medicine, talk to your doctor, pharmacist, or health care provider. Copyright© 2017 [employer]        Subconjunctival Hemorrhage    A subconjunctival hemorrhage is when a blood vessel breaks open in the white of the eye. It causes a bright red patch in the white of the eye. It is similar to a bruise on the skin. This type of hemorrhage is common. It can look quite alarming, but it is usually harmless.  Understanding the conjunctiva  The conjunctiva is the thin layer that covers the inside of the eyelids and the surface of the eye. It has many tiny blood vessels that bring oxygen and nutrients to the eye. The sclera is the white part of the eye that lies beneath the conjunctiva. Sometimes a blood vessel in the conjunctiva breaks open and bleeds. The blood then collects under the conjunctiva and turns part of the eye red. Over several weeks, your body then absorbs the blood.  What causes subconjunctival hemorrhage?  In many cases the cause isnt known. But some health conditions may make it more likely. These include:  · Eye injury  · Eye surgery  · High blood pressure  · Inflammation of the conjunctiva  · Contact lens use  · Diabetes  · Arteriosclerosis  · Tumor of the conjunctiva  · Diseases that affect blood clotting  · Violent sneezing, coughing, or vomiting  · Certain medicines that can  increase bleeding, such as aspirin  · Pushing hard during childbirth  · Straining during constipation  Symptoms of subconjunctival hemorrhage  The main symptom is a red patch on the eye. You may notice it after waking up in the morning. In most cases just one eye will have a hemorrhage. It usually happens once and then goes away. But some health conditions may cause repeat hemorrhages. You may feel like you have something in your eye, but this is not common. The hemorrhage shouldnt affect your eyesight or cause any pain. If you do have pain, you may have another type of problem with your eye.  Diagnosing subconjunctival hemorrhage  Your healthcare provider will ask about your health history. You may have a physical exam. This includes a basic eye exam. Your provider will make sure you dont have other causes of red eye that may need other treatment.  You will need to see an eye doctor (ophthalmologist) if you have had an eye injury. This doctor might use a special lighted microscope to look closely at your eye. This helps show the doctor if the injury hurt the eye itself and not just its outer layer.  If this is not your first subconjunctival hemorrhage, your doctor may need to find the cause. For example, you may need blood tests to check for a blood clotting disorder.  Treatment for subconjunctival hemorrhage  In most cases you will not need treatment. The red patch will usually go away on its own in a few weeks. It will turn from red to brown and then yellow. There are no treatments to speed up this process. Your doctor may suggest you use a warm compress and artificial tears eye drops to help relieve some of the redness.  If your subconjunctival hemorrhage was caused by a health condition, that condition will be treated. For example, you may need a blood pressure medicine to treat high blood pressure.  When to call your healthcare provider  Call your healthcare provider right away if you have any of  these:  · Hemorrhage that doesnt go away in 2 to 3 weeks  · Eye pain  · Loss of eyesight  · Another subconjunctival hemorrhage    Date Last Reviewed: 2/1/2017  © 6382-5411 [employer]. [address] [place] [address], [place] [address]. All rights reserved. This information is not intended as a substitute for professional medical care. Always follow your healthcare professional's instructions.

## 2019-12-05 NOTE — TELEPHONE ENCOUNTER
Called patient and informed her that we do not have any openings throughout our clinic. Offered patient an appt at main [facility], but pt denied. Also advised urgent care, but pt denied.    ----- Message from Sophia Valentine sent at 12/5/2019  9:02 AM CST -----  Contact: Self      -------FST SAME DAY Request------      Name of Who is Calling: KT GERARDO [[id number]]      What is the request in detail: Pt states she needs to be seen today to be checked. Pt states she believes she has a blood vessel on her eye. Pt was offered the on call nurse. Pt requested to come in today to be seen. Due to the insurance, no appts. Please contact to further discuss and advise.        Can the clinic reply by [facility]: N      What Number to Call Back if not in [facility]: [phone]

## 2019-12-05 NOTE — PROGRESS NOTES
"Subjective:      Patient ID: Radha Valle is a 54 y.o. female.    Chief Complaint: Eye Problem (blood vessell)    HPI   Patient here today for rednes in the right eye started after she sneezed. She has been constipated as well. No pain in the eye, blurry vision, changes in vision, loss of vision. 10 pound weight gain in 2 months.     Review of Systems   Constitutional: Negative for activity change, appetite change, chills, diaphoresis, fatigue, fever and unexpected weight change.   HENT: Negative for congestion, ear discharge, ear pain, hearing loss, postnasal drip, rhinorrhea, sinus pressure and sore throat.    Respiratory: Negative for cough, shortness of breath and wheezing.    Cardiovascular: Negative for chest pain.   Gastrointestinal: Negative for abdominal pain, constipation, diarrhea, nausea and vomiting.   Genitourinary: Negative for dysuria and frequency.   Musculoskeletal: Negative.    Psychiatric/Behavioral: Negative for suicidal ideas.     I personally reviewed Past Medical History, Past Surgical history,  Past Social History and Family History      Objective:   /82 (BP Location: Left arm, Patient Position: Sitting)   Pulse 72   Ht 5' 6.5" (1.689 m)   Wt 89.1 kg (196 lb 6.9 oz)   SpO2 98%   BMI 31.23 kg/m²     Physical Exam   Constitutional: She is oriented to person, place, and time. She appears well-developed and well-nourished. No distress.   HENT:   Head: Normocephalic and atraumatic.   Right Ear: Hearing, tympanic membrane, external ear and ear canal normal.   Left Ear: Hearing, tympanic membrane, external ear and ear canal normal.   Nose: Nose normal.   Mouth/Throat: Uvula is midline and oropharynx is clear and moist. No oropharyngeal exudate.   Eyes: Pupils are equal, round, and reactive to light. EOM are normal. Right eye exhibits no discharge. Left eye exhibits no discharge. Right conjunctiva is not injected. Right conjunctiva has a hemorrhage. Left conjunctiva is not " injected. Left conjunctiva has no hemorrhage. No scleral icterus.   Neck: Normal range of motion. Neck supple.   Cardiovascular: Normal rate, regular rhythm, normal heart sounds and intact distal pulses. Exam reveals no gallop.   No murmur heard.  Pulmonary/Chest: Effort normal and breath sounds normal. No respiratory distress. She has no wheezes. She has no rales. She exhibits no tenderness.   Abdominal: Soft.   Neurological: She is alert and oriented to person, place, and time.   Vitals reviewed.      1. Subconjunctival hemorrhage, unspecified laterality    2. Constipation, unspecified constipation type    3. Prediabetes        1. Supportive care, no pain with eye movement and she will call if any worsening or no improvement   2. Trial of linzess   3. Stop metformin and try bydureon instead   No orders of the defined types were placed in this encounter.    Medications Ordered This Encounter   Medications    exenatide microspheres (BYDUREON) 2 mg/0.65 mL PnIj     Sig: Inject 2 mg into the skin every 7 days.     Dispense:  4 each     Refill:  3    linaCLOtide (LINZESS) 72 mcg Cap capsule     Sig: Take 1 capsule (72 mcg total) by mouth once daily.     Dispense:  30 capsule     Refill:  3

## 2019-12-16 ENCOUNTER — TELEPHONE (OUTPATIENT)
Dept: INTERNAL MEDICINE | Facility: CLINIC | Age: 54
End: 2019-12-16

## 2019-12-16 NOTE — TELEPHONE ENCOUNTER
Spoke to pt and scheduled her to see Dr. Jin since Dr. Caro is out of office.    ----- Message from Flavia Alvarado sent at 12/16/2019  8:42 AM CST -----  Contact: KT GERARDO  Type: Patient Call Back    Who called:KT GERARDO    What is the request in detail: Patient is requesting a call back. She states that she was advised to call the office if the blood vessel in her eye came back. She states that he would like to be seen regarding this.   Please contact to further advise.    Can the clinic reply by [facility]? No    Best call back number: [phone]    Additional Information: N/A

## 2019-12-19 ENCOUNTER — PATIENT OUTREACH (OUTPATIENT)
Dept: ADMINISTRATIVE | Facility: HOSPITAL | Age: 54
End: 2019-12-19

## 2019-12-19 ENCOUNTER — OFFICE VISIT (OUTPATIENT)
Dept: INTERNAL MEDICINE | Facility: CLINIC | Age: 54
End: 2019-12-19
Attending: INTERNAL MEDICINE
Payer: MEDICAID

## 2019-12-19 VITALS
WEIGHT: 194.25 LBS | HEART RATE: 71 BPM | DIASTOLIC BLOOD PRESSURE: 80 MMHG | BODY MASS INDEX: 30.49 KG/M2 | OXYGEN SATURATION: 99 % | HEIGHT: 67 IN | SYSTOLIC BLOOD PRESSURE: 134 MMHG

## 2019-12-19 DIAGNOSIS — Z12.11 SCREENING FOR COLORECTAL CANCER: Primary | ICD-10-CM

## 2019-12-19 DIAGNOSIS — H57.11 EYE PAIN, RIGHT: Primary | ICD-10-CM

## 2019-12-19 DIAGNOSIS — I10 ESSENTIAL HYPERTENSION: ICD-10-CM

## 2019-12-19 DIAGNOSIS — H11.31 CONJUNCTIVAL HEMORRHAGE OF RIGHT EYE: ICD-10-CM

## 2019-12-19 DIAGNOSIS — Z12.12 SCREENING FOR COLORECTAL CANCER: Primary | ICD-10-CM

## 2019-12-19 PROCEDURE — 99999 PR PBB SHADOW E&M-EST. PATIENT-LVL III: ICD-10-PCS | Mod: PBBFAC,,, | Performed by: INTERNAL MEDICINE

## 2019-12-19 PROCEDURE — 99214 OFFICE O/P EST MOD 30 MIN: CPT | Mod: S$PBB,,, | Performed by: INTERNAL MEDICINE

## 2019-12-19 PROCEDURE — 99214 PR OFFICE/OUTPT VISIT, EST, LEVL IV, 30-39 MIN: ICD-10-PCS | Mod: S$PBB,,, | Performed by: INTERNAL MEDICINE

## 2019-12-19 PROCEDURE — 99999 PR PBB SHADOW E&M-EST. PATIENT-LVL III: CPT | Mod: PBBFAC,,, | Performed by: INTERNAL MEDICINE

## 2019-12-19 PROCEDURE — 99213 OFFICE O/P EST LOW 20 MIN: CPT | Mod: PBBFAC | Performed by: INTERNAL MEDICINE

## 2019-12-19 NOTE — PROGRESS NOTES
"Subjective:       Patient ID: Radha Valle is a 54 y.o. female.    Chief Complaint: Eye Problem    Here for urgent visit  Pt normally cared for by my colleague Dr. Caro and patient is new to me. I have reviewed patient's past medical, surgical, and social history in addition to MAR and allergies.     2 episodes of bleeding of right eye. This has occurred in setting of cough with recent URI symptoms and taking OTC cold medicine with NSAIDS. Symptoms have resolved. No eye pain, blurry vision, trauma, recurrent epistaxis, vaginal bleeding, hemoptysis. She reports a Hx of occasional eye pain but not related to this event.       Review of Systems   Constitutional: Negative for chills, fatigue, fever and unexpected weight change.   HENT: Negative for ear pain, hearing loss, postnasal drip, tinnitus, trouble swallowing and voice change.    Respiratory: Negative for cough, chest tightness, shortness of breath and wheezing.    Cardiovascular: Negative for chest pain, palpitations and leg swelling.   Gastrointestinal: Negative for abdominal pain, blood in stool, diarrhea, nausea and vomiting.   Endocrine: Negative for polydipsia, polyphagia and polyuria.   Genitourinary: Negative for difficulty urinating, dysuria, hematuria and vaginal bleeding.   Skin: Negative for rash.   Allergic/Immunologic: Negative for food allergies.   Neurological: Negative for dizziness, numbness and headaches.   Hematological: Does not bruise/bleed easily.   Psychiatric/Behavioral: The patient is not nervous/anxious.        Objective:      Vitals:    12/19/19 0824   BP: 134/80   Pulse: 71   SpO2: 99%   Weight: 88.1 kg (194 lb 3.6 oz)   Height: 5' 6.5" (1.689 m)      Physical Exam   Constitutional: She is oriented to person, place, and time. She appears well-developed and well-nourished. No distress.   HENT:   Head: Normocephalic and atraumatic.   Mouth/Throat: Oropharynx is clear and moist. No oropharyngeal exudate.   Eyes: Pupils " are equal, round, and reactive to light. Conjunctivae and EOM are normal. No scleral icterus.       Neck: No thyromegaly present.   Cardiovascular: Normal rate, regular rhythm and normal heart sounds.   No murmur heard.  Pulmonary/Chest: Effort normal and breath sounds normal. She has no wheezes. She has no rales.   Abdominal: Soft. She exhibits no distension. There is no tenderness.   Musculoskeletal: She exhibits no edema or tenderness.   Lymphadenopathy:     She has no cervical adenopathy.   Neurological: She is alert and oriented to person, place, and time.   Skin: Skin is warm and dry.   Psychiatric: She has a normal mood and affect. Her behavior is normal.       Assessment:       1. Eye pain, right    2. Conjunctival hemorrhage of right eye    3. Essential hypertension        Plan:       Radha was seen today for eye problem.    Diagnoses and all orders for this visit:    Eye pain, right  -     Ambulatory Referral to Optometry    Conjunctival hemorrhage of right eye   No red flags. In setting of URI cough and NSAIDS. Office and Emergency Department prompts discussed.    Essential hypertension   controlled. Continue current regimen           Nakul Grady MD  Internal Medicine-[facility]        Side effects of medication(s) were discussed in detail and patient voiced understanding.  Patient will call back for any issues or complications.

## 2020-01-13 ENCOUNTER — PATIENT OUTREACH (OUTPATIENT)
Dept: ADMINISTRATIVE | Facility: OTHER | Age: 55
End: 2020-01-13

## 2020-01-13 RX ORDER — METFORMIN HYDROCHLORIDE 500 MG/1
500 TABLET ORAL 2 TIMES DAILY WITH MEALS
Qty: 180 TABLET | Refills: 3 | Status: SHIPPED | OUTPATIENT
Start: 2020-01-13 | End: 2020-01-13 | Stop reason: SDUPTHER

## 2020-01-13 RX ORDER — METFORMIN HYDROCHLORIDE 500 MG/1
500 TABLET ORAL 2 TIMES DAILY WITH MEALS
Qty: 180 TABLET | Refills: 3 | Status: SHIPPED | OUTPATIENT
Start: 2020-01-13 | End: 2022-12-30 | Stop reason: SDUPTHER

## 2020-01-13 NOTE — TELEPHONE ENCOUNTER
Lmovm letting pt know Rx was sent in.    ----- Message from Lo Luque sent at 1/13/2020 10:27 AM CST -----  Contact: KT GERARDO [[id number]]    MEDICATION  REFILL  REQUEST    Please refill the medication(s) listed below. Please call the patient when the prescription(s) is ready for  at this phone number   [phone]      Medication #1  metFORMIN (GLUCOPHAGE) 500 MG tablet  //  90 day supply        Preferred Pharmacy: [facility] [place] - [address] [facility]     [phone] (Phone)  [phone] (Fax)

## 2020-01-20 ENCOUNTER — PATIENT OUTREACH (OUTPATIENT)
Dept: ADMINISTRATIVE | Facility: OTHER | Age: 55
End: 2020-01-20

## 2020-01-22 ENCOUNTER — OFFICE VISIT (OUTPATIENT)
Dept: OPTOMETRY | Facility: CLINIC | Age: 55
End: 2020-01-22
Attending: INTERNAL MEDICINE
Payer: MEDICAID

## 2020-01-22 DIAGNOSIS — R73.03 PREDIABETES: Primary | ICD-10-CM

## 2020-01-22 DIAGNOSIS — H52.4 PRESBYOPIA: ICD-10-CM

## 2020-01-22 DIAGNOSIS — H57.13 PAIN OF BOTH EYES: ICD-10-CM

## 2020-01-22 DIAGNOSIS — H11.31 SUBCONJUNCTIVAL HEMORRHAGE OF RIGHT EYE: ICD-10-CM

## 2020-01-22 PROCEDURE — 92004 COMPRE OPH EXAM NEW PT 1/>: CPT | Mod: S$PBB,,, | Performed by: OPTOMETRIST

## 2020-01-22 PROCEDURE — 99999 PR PBB SHADOW E&M-EST. PATIENT-LVL II: ICD-10-PCS | Mod: PBBFAC,,, | Performed by: OPTOMETRIST

## 2020-01-22 PROCEDURE — 92004 PR EYE EXAM, NEW PATIENT,COMPREHESV: ICD-10-PCS | Mod: S$PBB,,, | Performed by: OPTOMETRIST

## 2020-01-22 PROCEDURE — 99999 PR PBB SHADOW E&M-EST. PATIENT-LVL II: CPT | Mod: PBBFAC,,, | Performed by: OPTOMETRIST

## 2020-01-22 PROCEDURE — 99212 OFFICE O/P EST SF 10 MIN: CPT | Mod: PBBFAC | Performed by: OPTOMETRIST

## 2020-01-22 NOTE — LETTER
January 22, 2020      Nakul Jin MD  [address]  [address]  [facility] [address]           [facility] [place] [address]  [address]  [facility] [address]  Phone: [phone]  Fax: [phone]          Patient: Radha Valle   MR Number: [id number]   YOB: 1965   Date of Visit: 1/22/2020       Dear Dr. Nakul Jin:    Thank you for referring Radha Valle to me for evaluation. Attached you will find relevant portions of my assessment and plan of care.    If you have questions, please do not hesitate to call me. I look forward to following Radha Valle along with you.    Sincerely,    Nereyda Hogue, OD    Enclosure  CC:  No Recipients    If you would like to receive this communication electronically, please contact [email or web address] or [phone] to request more information on [employer] access.    For providers and/or their staff who would like to refer a patient to [facility], please contact us through our one-stop-shop provider referral line, [facility], at [phone].    If you feel you have received this communication in error or would no longer like to receive these types of communications, please e-mail [email or web address]

## 2020-01-22 NOTE — PROGRESS NOTES
HPI     Last eye exam was over 5 years ago.      Pt states that she has been having occasional sharp pains that has been   going on for a couple months and it is not everyday but it comes and goes.  Pt states on two separate occasions she has had blood vessels pop and both   times it was in OD.  Pt states she does suffer with allergies and she does have tearing,   itching, and burning OU  Patient denies diplopia, headaches, flashes/floaters, and pain.  Pt uses visine for allergies PRN OU, and no eye sx.    Last edited by Krys Wyatt on 1/22/2020  2:41 PM. (History)            Assessment /Plan     For exam results, see Encounter Report.    Prediabetes    Pain of both eyes    Subconjunctival hemorrhage of right eye    Presbyopia            1.  No retinopathy--monitor yearly.  BS control.  2.  Educated pt random eye pain caused by dry eye.  Recommend artificial tears as needed.  Recommend Zaditor or Alaway bid OU for allergies.  3.  Reassured pt eye health normal.  Educated pt to check BP if TRESA occurs again.  4.   Continue w/ current rx

## 2020-02-11 ENCOUNTER — TELEPHONE (OUTPATIENT)
Dept: INTERNAL MEDICINE | Facility: CLINIC | Age: 55
End: 2020-02-11

## 2020-02-11 NOTE — TELEPHONE ENCOUNTER
Spoke to pt and scheduled appt.    ----- Message from Agus Perez sent at 2/11/2020  8:03 AM CST -----  Contact: KT GERARDO [[id number]]  Name of Who is Calling:KT GERARDO [[id number]]    What is the request in detail: Pt is calling to schedule Appointment For today   .Tried  to schedule but was unable done Insurance  Pt is requesting a to speak to clinical team.Please contact to further discuss and advise        Can the clinic reply by [facility]:      What Number to Call Back if not in [facility]:[phone]

## 2020-02-12 ENCOUNTER — APPOINTMENT (OUTPATIENT)
Dept: RADIOLOGY | Facility: OTHER | Age: 55
End: 2020-02-12
Attending: FAMILY MEDICINE
Payer: MEDICAID

## 2020-02-12 ENCOUNTER — OFFICE VISIT (OUTPATIENT)
Dept: PRIMARY CARE CLINIC | Facility: CLINIC | Age: 55
End: 2020-02-12
Attending: FAMILY MEDICINE
Payer: MEDICAID

## 2020-02-12 VITALS
HEIGHT: 67 IN | HEART RATE: 69 BPM | SYSTOLIC BLOOD PRESSURE: 132 MMHG | OXYGEN SATURATION: 96 % | WEIGHT: 196.44 LBS | DIASTOLIC BLOOD PRESSURE: 84 MMHG | BODY MASS INDEX: 30.83 KG/M2

## 2020-02-12 DIAGNOSIS — Z12.11 SCREEN FOR COLON CANCER: ICD-10-CM

## 2020-02-12 DIAGNOSIS — R05.9 COUGH: Primary | ICD-10-CM

## 2020-02-12 DIAGNOSIS — R05.9 COUGH: ICD-10-CM

## 2020-02-12 PROCEDURE — 99999 PR PBB SHADOW E&M-EST. PATIENT-LVL IV: ICD-10-PCS | Mod: PBBFAC,,, | Performed by: FAMILY MEDICINE

## 2020-02-12 PROCEDURE — 99214 OFFICE O/P EST MOD 30 MIN: CPT | Mod: S$PBB,,, | Performed by: FAMILY MEDICINE

## 2020-02-12 PROCEDURE — 99214 PR OFFICE/OUTPT VISIT, EST, LEVL IV, 30-39 MIN: ICD-10-PCS | Mod: S$PBB,,, | Performed by: FAMILY MEDICINE

## 2020-02-12 PROCEDURE — 99999 PR PBB SHADOW E&M-EST. PATIENT-LVL IV: CPT | Mod: PBBFAC,,, | Performed by: FAMILY MEDICINE

## 2020-02-12 PROCEDURE — 99214 OFFICE O/P EST MOD 30 MIN: CPT | Mod: PBBFAC,25,PN | Performed by: FAMILY MEDICINE

## 2020-02-12 PROCEDURE — 71046 X-RAY EXAM CHEST 2 VIEWS: CPT | Mod: TC

## 2020-02-12 PROCEDURE — 71046 XR CHEST PA AND LATERAL: ICD-10-PCS | Mod: 26,,, | Performed by: RADIOLOGY

## 2020-02-12 PROCEDURE — 71046 X-RAY EXAM CHEST 2 VIEWS: CPT | Mod: 26,,, | Performed by: RADIOLOGY

## 2020-02-12 RX ORDER — BENZONATATE 100 MG/1
100 CAPSULE ORAL 3 TIMES DAILY PRN
Qty: 30 CAPSULE | Refills: 3 | Status: SHIPPED | OUTPATIENT
Start: 2020-02-12 | End: 2020-02-22

## 2020-02-12 RX ORDER — PROMETHAZINE HYDROCHLORIDE AND DEXTROMETHORPHAN HYDROBROMIDE 6.25; 15 MG/5ML; MG/5ML
5 SYRUP ORAL EVERY 4 HOURS PRN
Qty: 240 ML | Refills: 0 | Status: SHIPPED | OUTPATIENT
Start: 2020-02-12 | End: 2020-02-22

## 2020-02-12 RX ORDER — AZITHROMYCIN 250 MG/1
TABLET, FILM COATED ORAL
Qty: 6 TABLET | Refills: 0 | Status: SHIPPED | OUTPATIENT
Start: 2020-02-12 | End: 2020-02-17

## 2020-02-12 NOTE — PROGRESS NOTES
"Subjective:      Patient ID: Radha Valle is a 54 y.o. female.    Chief Complaint: Cough (since sunday)    HPI   Patient here today for cold symptoms. The cough is intermittent dry and productive. She does have an ache like pain on the top of the head. She denies any sick contacts. She denies any travel or camping. She denies fevers, wheezing, sob, abdominal pain, vomiting.           Review of Systems   Constitutional: Negative for activity change, appetite change, chills, diaphoresis, fatigue, fever and unexpected weight change.   HENT: Positive for rhinorrhea. Negative for congestion, ear discharge, ear pain, hearing loss, postnasal drip, sinus pressure and sore throat.    Respiratory: Positive for cough. Negative for shortness of breath and wheezing.    Cardiovascular: Negative for chest pain.   Gastrointestinal: Negative for abdominal pain, constipation, diarrhea, nausea and vomiting.   Genitourinary: Negative for dysuria and frequency.   Musculoskeletal: Negative.    Psychiatric/Behavioral: Negative for suicidal ideas.     I personally reviewed Past Medical History, Past Surgical history,  Past Social History and Family History      Objective:   /84 (BP Location: Left arm, Patient Position: Sitting)   Pulse 69   Ht 5' 6.5" (1.689 m)   Wt 89.1 kg (196 lb 6.9 oz)   SpO2 96%   BMI 31.23 kg/m²     Physical Exam   Constitutional: She is oriented to person, place, and time. She appears well-developed and well-nourished. No distress.   HENT:   Head: Normocephalic and atraumatic.   Right Ear: Hearing, tympanic membrane, external ear and ear canal normal.   Left Ear: Hearing, tympanic membrane, external ear and ear canal normal.   Nose: Nose normal.   Mouth/Throat: Uvula is midline and oropharynx is clear and moist. No oropharyngeal exudate.   Eyes: Pupils are equal, round, and reactive to light. Conjunctivae and EOM are normal. Right eye exhibits no discharge. Left eye exhibits no discharge. No " scleral icterus.   Neck: Normal range of motion. Neck supple.   Cardiovascular: Normal rate, regular rhythm, normal heart sounds and intact distal pulses. Exam reveals no gallop.   No murmur heard.  Pulmonary/Chest: Effort normal. No respiratory distress. She has decreased breath sounds. She has no wheezes. She has no rales. She exhibits no tenderness.   Abdominal: Soft. Bowel sounds are normal. She exhibits no distension and no mass. There is no tenderness. There is no rebound and no guarding.   Neurological: She is alert and oriented to person, place, and time.   Skin: Skin is warm and dry.   Vitals reviewed.      1. Cough    2. Screen for colon cancer        1.cxr and start abx, call if no improvement or worsening  2. Patient to schedule this, referral placed     Orders Placed This Encounter   Procedures    X-Ray Chest PA And Lateral    Ambulatory referral/consult to Gastroenterology     Medications Ordered This Encounter   Medications    azithromycin (Z-ANABEL) 250 MG tablet     Sig: Take 2 tablets by mouth on day 1; Take 1 tablet by mouth on days 2-5     Dispense:  6 tablet     Refill:  0    benzonatate (TESSALON) 100 MG capsule     Sig: Take 1 capsule (100 mg total) by mouth 3 (three) times daily as needed for Cough.     Dispense:  30 capsule     Refill:  3    promethazine-dextromethorphan (PROMETHAZINE-DM) 6.25-15 mg/5 mL Syrp     Sig: Take 5 mLs by mouth every 4 (four) hours as needed (cough).     Dispense:  240 mL     Refill:  0

## 2020-02-12 NOTE — LETTER
February 12, 2020    Radha Valle  [address]  [place] [address]         [place] [facility] [address]  Phone: [phone]  Fax: [phone] February 12, 2020     Patient: Radha Valle   YOB: 1965   Date of Visit: 2/12/2020       To Whom It May Concern:    Patient seen today in clinic please excuse until 2/17.   If you have any questions or concerns, please don't hesitate to call.    Sincerely,        Melba Caro MD     
98.8

## 2020-07-31 RX ORDER — IRBESARTAN 150 MG/1
150 TABLET ORAL NIGHTLY
Qty: 90 TABLET | Refills: 0 | Status: SHIPPED | OUTPATIENT
Start: 2020-07-31 | End: 2020-11-19

## 2020-07-31 NOTE — TELEPHONE ENCOUNTER
----- Message from Katelyn Trevizo sent at 7/31/2020 10:36 AM CDT -----  Contact: KT GERARDO [[id number]]      Can the clinic reply in [facility]: no      Please refill the medication(s) listed below. Please call the patient when the prescription(s) is ready for  at this phone number  [phone] (home)         Medication #1 irbesartan (AVAPRO) 150 MG tablet    Medication #2 linaCLOtide (LINZESS) 72 mcg Cap capsule      Preferred Pharmacy:   [facility] [address] [place] [address] [address] [place] [address]  [address] [place] [address]  Phone: [phone] Fax: [phone]

## 2020-08-13 ENCOUNTER — TELEPHONE (OUTPATIENT)
Dept: OBSTETRICS AND GYNECOLOGY | Facility: CLINIC | Age: 55
End: 2020-08-13

## 2020-08-13 NOTE — TELEPHONE ENCOUNTER
----- Message from Albert Guo sent at 8/13/2020  1:00 PM CDT -----  Regarding: Appointment  Contact: KT GERARDO [[id number]]  Name of Who is Calling: KT GERARDO [[id number]]      What is the request in detail: (m) Would like to speak with staff in regards to scheduling annual appt in September. No availability for , please advise.       Can the clinic reply by [facility]: no      What Number to Call Back if not in [facility]: [phone]

## 2020-09-06 ENCOUNTER — PATIENT OUTREACH (OUTPATIENT)
Dept: ADMINISTRATIVE | Facility: OTHER | Age: 55
End: 2020-09-06

## 2020-09-06 NOTE — PROGRESS NOTES
Care Everywhere: updated  Immunization: updated  Health Maintenance: updated  Media Review: review for outside colon cancer report   Legacy Review:   Order placed:   Upcoming appts:  Referral to Gastroenterology 2/12/2020

## 2020-09-09 ENCOUNTER — HOSPITAL ENCOUNTER (OUTPATIENT)
Dept: RADIOLOGY | Facility: OTHER | Age: 55
Discharge: HOME | End: 2020-09-09
Attending: OBSTETRICS & GYNECOLOGY
Payer: MEDICAID

## 2020-09-09 ENCOUNTER — OFFICE VISIT (OUTPATIENT)
Dept: OBSTETRICS AND GYNECOLOGY | Facility: CLINIC | Age: 55
End: 2020-09-09
Attending: OBSTETRICS & GYNECOLOGY
Payer: MEDICAID

## 2020-09-09 VITALS
SYSTOLIC BLOOD PRESSURE: 132 MMHG | WEIGHT: 202.38 LBS | DIASTOLIC BLOOD PRESSURE: 78 MMHG | HEIGHT: 67 IN | BODY MASS INDEX: 31.76 KG/M2

## 2020-09-09 DIAGNOSIS — Z12.39 BREAST CANCER SCREENING: ICD-10-CM

## 2020-09-09 DIAGNOSIS — Z12.31 BREAST CANCER SCREENING BY MAMMOGRAM: ICD-10-CM

## 2020-09-09 DIAGNOSIS — Z01.419 WELL FEMALE EXAM WITH ROUTINE GYNECOLOGICAL EXAM: Primary | ICD-10-CM

## 2020-09-09 PROCEDURE — 99999 PR PBB SHADOW E&M-EST. PATIENT-LVL III: ICD-10-PCS | Mod: PBBFAC,,, | Performed by: OBSTETRICS & GYNECOLOGY

## 2020-09-09 PROCEDURE — 77067 MAMMO DIGITAL SCREENING BILAT WITH TOMOSYNTHESIS_CAD: ICD-10-PCS | Mod: 26,,, | Performed by: RADIOLOGY

## 2020-09-09 PROCEDURE — 77063 BREAST TOMOSYNTHESIS BI: CPT | Mod: 26,,, | Performed by: RADIOLOGY

## 2020-09-09 PROCEDURE — 77067 SCR MAMMO BI INCL CAD: CPT | Mod: 26,,, | Performed by: RADIOLOGY

## 2020-09-09 PROCEDURE — 77067 SCR MAMMO BI INCL CAD: CPT | Mod: TC

## 2020-09-09 PROCEDURE — 99213 OFFICE O/P EST LOW 20 MIN: CPT | Mod: PBBFAC | Performed by: OBSTETRICS & GYNECOLOGY

## 2020-09-09 PROCEDURE — 99396 PR PREVENTIVE VISIT,EST,40-64: ICD-10-PCS | Mod: S$PBB,,, | Performed by: OBSTETRICS & GYNECOLOGY

## 2020-09-09 PROCEDURE — 77063 MAMMO DIGITAL SCREENING BILAT WITH TOMOSYNTHESIS_CAD: ICD-10-PCS | Mod: 26,,, | Performed by: RADIOLOGY

## 2020-09-09 PROCEDURE — 99396 PREV VISIT EST AGE 40-64: CPT | Mod: S$PBB,,, | Performed by: OBSTETRICS & GYNECOLOGY

## 2020-09-09 PROCEDURE — 99999 PR PBB SHADOW E&M-EST. PATIENT-LVL III: CPT | Mod: PBBFAC,,, | Performed by: OBSTETRICS & GYNECOLOGY

## 2020-09-10 NOTE — PROGRESS NOTES
SUBJECTIVE:   55 y.o. female   for routine gyn exam. Patient's last menstrual period was 2020 (approximate)..  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   Social Needs    Financial resource strain: Not on file    Food insecurity     Worry: Not on file     Inability: Not on file    Transportation needs     Medical: Not on file     Non-medical: Not on file   Tobacco Use    Smoking status: Former Smoker     Packs/day: 0.50     Years: 30.00     Pack years: 15.00     Types: Cigarettes     Quit date: 10/17/2018     Years since quittin.9    Smokeless tobacco: Former User   Substance and Sexual Activity    Alcohol use: Yes     Alcohol/week: 0.0 standard drinks     Comment: social    Drug use: No    Sexual activity: Yes     Partners: Male     Birth control/protection: Surgical     Comment: Tubal ligation   Lifestyle    Physical activity     Days per week: Not on file     Minutes per session: Not on file    Stress: Not on file   Relationships    Social connections     Talks on phone: Not on file     Gets together: Not on file     Attends Catholic service: Not on file     Active member of club or organization: Not on file     Attends meetings of clubs or organizations: Not on file     Relationship status: Not on file   Other Topics Concern    Not on file   Social History Narrative    Pt's son lives with her.  He has autism and  is 20 yrs old.  Another son also had autism and is .       Family History   Problem Relation Age of Onset    Diabetes Mother     Hyperlipidemia Mother     Hypertension Mother     COPD Father     Diabetes Sister     Alcohol abuse Brother     Cancer Brother         liver    Diabetes Sister     Diabetes Sister     Hypertension Brother     Hypertension Brother     Stroke Brother     Breast cancer Cousin         2nd cousin    Colon cancer Neg Hx     Ovarian cancer Neg Hx      OB History    Para Term  AB Living   4 3 3   1 3   SAB TAB Ectopic Multiple Live Births           3      # Outcome Date GA Lbr Paul/2nd Weight Sex Delivery Anes PTL Lv   4 AB            3 Term            2 Term            1 Term                  Current Outpatient Medications   Medication Sig Dispense Refill    irbesartan (AVAPRO) 150 MG tablet Take 1 tablet (150 mg total) by mouth every evening. 90 tablet 0    linaCLOtide (LINZESS) 72 mcg Cap capsule Take 1 capsule (72 mcg total) by mouth once daily. 90 capsule 0    metFORMIN (GLUCOPHAGE) 500 MG tablet Take 1 tablet (500 mg total) by mouth 2 (two) times daily with meals. 180 tablet 3    pravastatin (PRAVACHOL) 40 MG tablet Take 1 tablet (40 mg total) by mouth once daily. 30 tablet 3     No current facility-administered medications for this visit.      Allergies: Patient has no known allergies.     ROS:  Constitutional: no weight loss, weight gain, fever, fatigue  Eyes:  No vision changes, glasses/contacts  ENT/Mouth: No ulcers, sinus problems, ears ringing, headache  Cardiovascular: No inability to lie flat, chest pain, exercise intolerance, swelling, heart palpitations  Respiratory: No wheezing, coughing blood, shortness of breath, or cough  Gastrointestinal: No diarrhea, bloody stool, nausea/vomiting, constipation, gas, hemorrhoids  Genitourinary: No blood in urine, painful urination, urgency of urination, frequency of urination, incomplete emptying,  "incontinence, abnormal bleeding, painful periods, heavy periods, vaginal discharge, vaginal odor, painful intercourse, sexual problems, bleeding after intercourse.  Musculoskeletal: No muscle weakness  Skin/Breast: No painful breasts, nipple discharge, masses, rash, ulcers  Neurological: No passing out, seizures, numbness, headache  Endocrine: No diabetes, hypothyroid, hyperthyroid, hot flashes, hair loss, abnormal hair growth, acne  Psychiatric: No depression, crying  Hematologic: No bruises, bleeding, swollen lymph nodes, anemia.      OBJECTIVE:   The patient appears well, alert, oriented x 3, in no distress.  /78   Ht 5' 6.5" (1.689 m)   Wt 91.8 kg (202 lb 6.1 oz)   LMP 08/03/2020 (Approximate)   BMI 32.18 kg/m²   NECK: no thyromegaly, trachea midline  SKIN: no acne, striae, hirsutism  CHEST: CTAB  CV: RRR  BREAST EXAM: breasts appear normal, no suspicious masses, no skin or nipple changes or axillary nodes  ABDOMEN: no hernias, masses, or hepatosplenomegaly  GENITALIA: normal external genitalia, no erythema, no discharge  URETHRA: normal urethra, normal urethral meatus  VAGINA: Normal  CERVIX: no lesions or cervical motion tenderness  UTERUS: normal size, contour, position, consistency, mobility, non-tender  ADNEXA: no mass, fullness, tenderness      ASSESSMENT:   1. Well female exam with routine gynecological exam     2. Breast cancer screening  CANCELED: Mammo Digital Screening Bilat w/ Martínez       PLAN:      Discussed healthy lifestyle including regular exercise, healthy eating, etc.  Return to clinic in 1 year    "

## 2020-09-16 ENCOUNTER — TELEPHONE (OUTPATIENT)
Dept: INTERNAL MEDICINE | Facility: CLINIC | Age: 55
End: 2020-09-16

## 2020-09-16 DIAGNOSIS — K63.5 POLYP OF COLON, UNSPECIFIED PART OF COLON, UNSPECIFIED TYPE: Primary | ICD-10-CM

## 2020-09-16 NOTE — TELEPHONE ENCOUNTER
----- Message from Katelyn Trevizo sent at 9/16/2020  9:20 AM CDT -----  Contact: KT GERARDO [[id number]]  Name of Who is Calling: KT GERARDO [[id number]]      What is the request in detail: Patient would like orders for colonoscopy. Please call        Can the clinic reply by [facility]: no      What [facility] to [facility] Back [facility]: [phone] (home)

## 2020-11-18 DIAGNOSIS — Z01.818 PRE-OP TESTING: ICD-10-CM

## 2020-11-18 DIAGNOSIS — Z12.11 COLON CANCER SCREENING: Primary | ICD-10-CM

## 2020-11-18 RX ORDER — POLYETHYLENE GLYCOL 3350, SODIUM SULFATE ANHYDROUS, SODIUM BICARBONATE, SODIUM CHLORIDE, POTASSIUM CHLORIDE 236; 22.74; 6.74; 5.86; 2.97 G/4L; G/4L; G/4L; G/4L; G/4L
4 POWDER, FOR SOLUTION ORAL ONCE
Qty: 4000 ML | Refills: 0 | Status: SHIPPED | OUTPATIENT
Start: 2020-11-18 | End: 2020-11-18

## 2020-11-21 ENCOUNTER — LAB VISIT (OUTPATIENT)
Dept: INTERNAL MEDICINE | Facility: CLINIC | Age: 55
End: 2020-11-21
Payer: MEDICAID

## 2020-11-21 DIAGNOSIS — Z01.818 PRE-OP TESTING: ICD-10-CM

## 2020-11-21 PROCEDURE — U0003 INFECTIOUS AGENT DETECTION BY NUCLEIC ACID (DNA OR RNA); SEVERE ACUTE RESPIRATORY SYNDROME CORONAVIRUS 2 (SARS-COV-2) (CORONAVIRUS DISEASE [COVID-19]), AMPLIFIED PROBE TECHNIQUE, MAKING USE OF HIGH THROUGHPUT TECHNOLOGIES AS DESCRIBED BY CMS-2020-01-R: HCPCS

## 2020-11-22 LAB — SARS-COV-2 RNA RESP QL NAA+PROBE: NOT DETECTED

## 2020-11-23 DIAGNOSIS — Z01.818 PRE-OP TESTING: Primary | ICD-10-CM

## 2020-12-07 ENCOUNTER — OFFICE VISIT (OUTPATIENT)
Dept: INTERNAL MEDICINE | Facility: CLINIC | Age: 55
End: 2020-12-07
Attending: INTERNAL MEDICINE
Payer: MEDICAID

## 2020-12-07 VITALS
HEART RATE: 79 BPM | BODY MASS INDEX: 31.76 KG/M2 | SYSTOLIC BLOOD PRESSURE: 167 MMHG | OXYGEN SATURATION: 97 % | DIASTOLIC BLOOD PRESSURE: 95 MMHG | HEIGHT: 67 IN | WEIGHT: 202.38 LBS

## 2020-12-07 DIAGNOSIS — J30.1 NON-SEASONAL ALLERGIC RHINITIS DUE TO POLLEN: ICD-10-CM

## 2020-12-07 DIAGNOSIS — E78.5 HYPERLIPIDEMIA, UNSPECIFIED HYPERLIPIDEMIA TYPE: ICD-10-CM

## 2020-12-07 DIAGNOSIS — Z00.00 ANNUAL PHYSICAL EXAM: Primary | ICD-10-CM

## 2020-12-07 DIAGNOSIS — I10 ESSENTIAL HYPERTENSION: ICD-10-CM

## 2020-12-07 DIAGNOSIS — R73.03 PREDIABETES: ICD-10-CM

## 2020-12-07 PROCEDURE — 99999 PR PBB SHADOW E&M-EST. PATIENT-LVL III: ICD-10-PCS | Mod: PBBFAC,,, | Performed by: INTERNAL MEDICINE

## 2020-12-07 PROCEDURE — 99999 PR PBB SHADOW E&M-EST. PATIENT-LVL III: CPT | Mod: PBBFAC,,, | Performed by: INTERNAL MEDICINE

## 2020-12-07 PROCEDURE — 99214 OFFICE O/P EST MOD 30 MIN: CPT | Mod: S$PBB,,, | Performed by: INTERNAL MEDICINE

## 2020-12-07 PROCEDURE — 99214 PR OFFICE/OUTPT VISIT, EST, LEVL IV, 30-39 MIN: ICD-10-PCS | Mod: S$PBB,,, | Performed by: INTERNAL MEDICINE

## 2020-12-07 PROCEDURE — 99213 OFFICE O/P EST LOW 20 MIN: CPT | Mod: PBBFAC | Performed by: INTERNAL MEDICINE

## 2020-12-07 RX ORDER — MONTELUKAST SODIUM 10 MG/1
10 TABLET ORAL NIGHTLY
Qty: 90 TABLET | Refills: 2 | Status: SHIPPED | OUTPATIENT
Start: 2020-12-07 | End: 2021-01-06

## 2020-12-07 RX ORDER — IRBESARTAN 300 MG/1
300 TABLET ORAL NIGHTLY
Qty: 90 TABLET | Refills: 2 | Status: SHIPPED | OUTPATIENT
Start: 2020-12-07 | End: 2022-12-30 | Stop reason: SDUPTHER

## 2020-12-07 NOTE — PROGRESS NOTES
Subjective:       Patient ID: Radha Valle is a 55 y.o. female.    Chief Complaint: Establish Care    Here to establish care  Pt normally cared for by my colleague  and patient is new to me. I have reviewed patient's past medical, surgical, and social history in addition to MAR and allergies.     Needs refill on cholesterol.     Watery itchy eyes, scratchy throat, sneezing, post nasal drip on a near daily basis. Took calritin but did not find this effective so stopped. Using lubricating eye gtt.      Review of Systems   Constitutional: Negative for chills, fatigue, fever and unexpected weight change.   HENT: Negative for ear pain, hearing loss, postnasal drip, tinnitus, trouble swallowing and voice change.    Respiratory: Negative for cough, chest tightness, shortness of breath and wheezing.    Cardiovascular: Negative for chest pain, palpitations and leg swelling.   Gastrointestinal: Negative for abdominal pain, blood in stool, diarrhea, nausea and vomiting.   Endocrine: Negative for polydipsia, polyphagia and polyuria.   Genitourinary: Negative for difficulty urinating, dysuria, hematuria and vaginal bleeding.   Skin: Negative for rash.   Allergic/Immunologic: Negative for food allergies.   Neurological: Negative for dizziness, numbness and headaches.   Hematological: Does not bruise/bleed easily.   Psychiatric/Behavioral: The patient is not nervous/anxious.        Past Medical History:   Diagnosis Date    Bulging lumbar disc     Bulging of cervical intervertebral disc     Hyperlipidemia     Hypertension     Obese     Prediabetes     Vitamin D deficiency      Past Surgical History:   Procedure Laterality Date    TUBAL LIGATION      WISDOM TOOTH EXTRACTION       Family History   Problem Relation Age of Onset    Diabetes Mother     Hyperlipidemia Mother     Hypertension Mother     COPD Father     Diabetes Sister     Alcohol abuse Brother     Cancer Brother         liver    Diabetes  "Sister     Diabetes Sister     Hypertension Brother     Hypertension Brother     Stroke Brother     Breast cancer Cousin         2nd cousin    Colon cancer Neg Hx     Ovarian cancer Neg Hx      Social History     Socioeconomic History    Marital status:      Spouse name: Not on file    Number of children: Not on file    Years of education: Not on file    Highest education level: Not on file   Occupational History    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   Social Needs    Financial resource strain: Not on file    Food insecurity     Worry: Not on file     Inability: Not on file    Transportation needs     Medical: Not on file     Non-medical: Not on file   Tobacco Use    Smoking status: Former Smoker     Packs/day: 0.50     Years: 30.00     Pack years: 15.00     Types: Cigarettes     Quit date: 10/17/2018     Years since quittin.1    Smokeless tobacco: Former User   Substance and Sexual Activity    Alcohol use: Yes     Alcohol/week: 0.0 standard drinks     Comment: social    Drug use: No    Sexual activity: Yes     Partners: Male     Birth control/protection: Surgical     Comment: Tubal ligation   Lifestyle    Physical activity     Days per week: Not on file     Minutes per session: Not on file    Stress: Not on file   Relationships    Social connections     Talks on phone: Not on file     Gets together: Not on file     Attends Taoism service: Not on file     Active member of club or organization: Not on file     Attends meetings of clubs or organizations: Not on file     Relationship status: Not on file   Other Topics Concern    Not on file   Social History Narrative    Pt's son lives with her.  He has autism and is 20 yrs old.  Another son also had autism and is .           Objective:      Vitals:    20 1124   BP: (!) 167/95   Pulse: 79   SpO2: 97%   Weight: 91.8 kg (202 lb 6.1 oz)   Height: 5' 6.5" (1.689 m)      Physical Exam  Constitutional:       General: She is " not in acute distress.     Appearance: She is well-developed.   HENT:      Head: Normocephalic and atraumatic.      Mouth/Throat:      Pharynx: No oropharyngeal exudate.   Eyes:      General: No scleral icterus.     Conjunctiva/sclera: Conjunctivae normal.      Pupils: Pupils are equal, round, and reactive to light.   Neck:      Thyroid: No thyromegaly.   Cardiovascular:      Rate and Rhythm: Normal rate and regular rhythm.      Heart sounds: Normal heart sounds. No murmur.   Pulmonary:      Effort: Pulmonary effort is normal.      Breath sounds: Normal breath sounds. No wheezing or rales.   Abdominal:      General: There is no distension.      Palpations: Abdomen is soft.      Tenderness: There is no abdominal tenderness.   Musculoskeletal:         General: No tenderness.   Lymphadenopathy:      Cervical: No cervical adenopathy.   Skin:     General: Skin is warm and dry.   Neurological:      Mental Status: She is alert and oriented to person, place, and time.   Psychiatric:         Behavior: Behavior normal.         Assessment:       1. Annual physical exam    2. Prediabetes    3. Hyperlipidemia, unspecified hyperlipidemia type    4. Non-seasonal allergic rhinitis due to pollen    5. Essential hypertension        Plan:       Radha was seen today for establish care.    Diagnoses and all orders for this visit:    Annual physical exam  -     Comprehensive Metabolic Panel; Future  -     Lipid Panel; Future  -     TSH; Future  -     CBC Auto Differential; Future  -     Hemoglobin A1C; Future    Prediabetes  -     Hemoglobin A1C; Future    Hyperlipidemia, unspecified hyperlipidemia type   Tolerating statin. Continue this.     Non-seasonal allergic rhinitis due to pollen  Start in concert flonase, singulair, claritin, and Naphcon-A. If does not improve then to allergist we go.  -     montelukast (SINGULAIR) 10 mg tablet; Take 1 tablet (10 mg total) by mouth every evening.    Essential hypertension   Uncontrolled.  Asymptomatic.   -    INCREASE  irbesartan (AVAPRO) 300 MG tablet; Take 1 tablet (300 mg total) by mouth every evening.  f/u in 3-4 weeks for nurse visit for BP check                 Side effects of medication(s) were discussed in detail and patient voiced understanding.  Patient will call back for any issues or complications.

## 2021-02-28 ENCOUNTER — HOSPITAL ENCOUNTER (EMERGENCY)
Facility: OTHER | Age: 56
Discharge: HOME | End: 2021-02-28
Attending: EMERGENCY MEDICINE
Payer: MEDICAID

## 2021-02-28 VITALS
HEART RATE: 68 BPM | BODY MASS INDEX: 30.53 KG/M2 | RESPIRATION RATE: 18 BRPM | HEIGHT: 66 IN | WEIGHT: 190 LBS | TEMPERATURE: 98 F | SYSTOLIC BLOOD PRESSURE: 171 MMHG | OXYGEN SATURATION: 98 % | DIASTOLIC BLOOD PRESSURE: 97 MMHG

## 2021-02-28 DIAGNOSIS — S22.32XA CLOSED FRACTURE OF ONE RIB OF LEFT SIDE, INITIAL ENCOUNTER: Primary | ICD-10-CM

## 2021-02-28 DIAGNOSIS — R07.89 CHEST WALL PAIN: ICD-10-CM

## 2021-02-28 PROCEDURE — 25000003 PHARM REV CODE 250: Performed by: EMERGENCY MEDICINE

## 2021-02-28 PROCEDURE — 99284 EMERGENCY DEPT VISIT MOD MDM: CPT | Mod: 25

## 2021-02-28 PROCEDURE — 94799 UNLISTED PULMONARY SVC/PX: CPT

## 2021-02-28 RX ORDER — IBUPROFEN 600 MG/1
600 TABLET ORAL
Status: COMPLETED | OUTPATIENT
Start: 2021-02-28 | End: 2021-02-28

## 2021-02-28 RX ORDER — OXYCODONE AND ACETAMINOPHEN 5; 325 MG/1; MG/1
1 TABLET ORAL
Status: COMPLETED | OUTPATIENT
Start: 2021-02-28 | End: 2021-02-28

## 2021-02-28 RX ORDER — IBUPROFEN 600 MG/1
600 TABLET ORAL EVERY 6 HOURS PRN
Qty: 20 TABLET | Refills: 0 | Status: SHIPPED | OUTPATIENT
Start: 2021-02-28 | End: 2021-03-08 | Stop reason: SDUPTHER

## 2021-02-28 RX ORDER — HYDROCODONE BITARTRATE AND ACETAMINOPHEN 5; 325 MG/1; MG/1
1 TABLET ORAL EVERY 4 HOURS PRN
Qty: 12 TABLET | Refills: 0 | Status: SHIPPED | OUTPATIENT
Start: 2021-02-28 | End: 2021-03-10

## 2021-02-28 RX ADMIN — IBUPROFEN 600 MG: 600 TABLET, FILM COATED ORAL at 11:02

## 2021-02-28 RX ADMIN — OXYCODONE HYDROCHLORIDE AND ACETAMINOPHEN 1 TABLET: 5; 325 TABLET ORAL at 11:02

## 2021-03-01 ENCOUNTER — OFFICE VISIT (OUTPATIENT)
Dept: INTERNAL MEDICINE | Facility: CLINIC | Age: 56
End: 2021-03-01
Payer: MEDICAID

## 2021-03-01 VITALS
BODY MASS INDEX: 32.66 KG/M2 | HEART RATE: 68 BPM | HEIGHT: 66 IN | WEIGHT: 203.25 LBS | SYSTOLIC BLOOD PRESSURE: 137 MMHG | DIASTOLIC BLOOD PRESSURE: 89 MMHG

## 2021-03-01 DIAGNOSIS — Z76.0 MEDICATION REFILL: ICD-10-CM

## 2021-03-01 DIAGNOSIS — S22.32XA CLOSED FRACTURE OF ONE RIB OF LEFT SIDE, INITIAL ENCOUNTER: Primary | ICD-10-CM

## 2021-03-01 PROCEDURE — 99999 PR PBB SHADOW E&M-EST. PATIENT-LVL III: CPT | Mod: PBBFAC,,, | Performed by: PHYSICIAN ASSISTANT

## 2021-03-01 PROCEDURE — 99213 OFFICE O/P EST LOW 20 MIN: CPT | Mod: PBBFAC | Performed by: PHYSICIAN ASSISTANT

## 2021-03-01 PROCEDURE — 99213 PR OFFICE/OUTPT VISIT, EST, LEVL III, 20-29 MIN: ICD-10-PCS | Mod: S$PBB,,, | Performed by: PHYSICIAN ASSISTANT

## 2021-03-01 PROCEDURE — 99999 PR PBB SHADOW E&M-EST. PATIENT-LVL III: ICD-10-PCS | Mod: PBBFAC,,, | Performed by: PHYSICIAN ASSISTANT

## 2021-03-01 PROCEDURE — 99213 OFFICE O/P EST LOW 20 MIN: CPT | Mod: S$PBB,,, | Performed by: PHYSICIAN ASSISTANT

## 2021-03-01 RX ORDER — PRAVASTATIN SODIUM 40 MG/1
40 TABLET ORAL DAILY
Qty: 30 TABLET | Refills: 3 | Status: SHIPPED | OUTPATIENT
Start: 2021-03-01 | End: 2022-02-15

## 2021-03-01 RX ORDER — LIDOCAINE 50 MG/G
1 PATCH TOPICAL
Status: DISCONTINUED | OUTPATIENT
Start: 2021-03-01 | End: 2021-03-01

## 2021-03-01 RX ORDER — LIDOCAINE 50 MG/G
1 PATCH TOPICAL DAILY
Qty: 10 PATCH | Refills: 0 | Status: SHIPPED | OUTPATIENT
Start: 2021-03-01 | End: 2021-03-08 | Stop reason: SDUPTHER

## 2021-03-08 ENCOUNTER — TELEPHONE (OUTPATIENT)
Dept: INTERNAL MEDICINE | Facility: CLINIC | Age: 56
End: 2021-03-08

## 2021-03-08 ENCOUNTER — OFFICE VISIT (OUTPATIENT)
Dept: INTERNAL MEDICINE | Facility: CLINIC | Age: 56
End: 2021-03-08
Payer: MEDICAID

## 2021-03-08 VITALS
OXYGEN SATURATION: 98 % | SYSTOLIC BLOOD PRESSURE: 140 MMHG | WEIGHT: 204.81 LBS | BODY MASS INDEX: 32.92 KG/M2 | HEIGHT: 66 IN | HEART RATE: 70 BPM | DIASTOLIC BLOOD PRESSURE: 84 MMHG

## 2021-03-08 DIAGNOSIS — I10 ESSENTIAL HYPERTENSION: ICD-10-CM

## 2021-03-08 DIAGNOSIS — S22.32XA CLOSED FRACTURE OF ONE RIB OF LEFT SIDE, INITIAL ENCOUNTER: Primary | ICD-10-CM

## 2021-03-08 PROCEDURE — 99213 OFFICE O/P EST LOW 20 MIN: CPT | Mod: S$PBB,,, | Performed by: PHYSICIAN ASSISTANT

## 2021-03-08 PROCEDURE — 99999 PR PBB SHADOW E&M-EST. PATIENT-LVL IV: ICD-10-PCS | Mod: PBBFAC,,, | Performed by: PHYSICIAN ASSISTANT

## 2021-03-08 PROCEDURE — 99999 PR PBB SHADOW E&M-EST. PATIENT-LVL IV: CPT | Mod: PBBFAC,,, | Performed by: PHYSICIAN ASSISTANT

## 2021-03-08 PROCEDURE — 99213 PR OFFICE/OUTPT VISIT, EST, LEVL III, 20-29 MIN: ICD-10-PCS | Mod: S$PBB,,, | Performed by: PHYSICIAN ASSISTANT

## 2021-03-08 PROCEDURE — 99214 OFFICE O/P EST MOD 30 MIN: CPT | Mod: PBBFAC | Performed by: PHYSICIAN ASSISTANT

## 2021-03-08 RX ORDER — LIDOCAINE 50 MG/G
1 PATCH TOPICAL DAILY
Qty: 10 PATCH | Refills: 0 | Status: SHIPPED | OUTPATIENT
Start: 2021-03-08 | End: 2021-03-22 | Stop reason: SDUPTHER

## 2021-03-08 RX ORDER — IBUPROFEN 600 MG/1
600 TABLET ORAL EVERY 6 HOURS PRN
Qty: 20 TABLET | Refills: 0 | Status: SHIPPED | OUTPATIENT
Start: 2021-03-08 | End: 2021-10-20

## 2021-03-08 RX ORDER — HYDROCODONE BITARTRATE AND ACETAMINOPHEN 5; 325 MG/1; MG/1
1 TABLET ORAL EVERY 4 HOURS PRN
Qty: 12 TABLET | Refills: 0 | Status: CANCELLED | OUTPATIENT
Start: 2021-03-08 | End: 2021-03-18

## 2021-03-15 ENCOUNTER — TELEPHONE (OUTPATIENT)
Dept: INTERNAL MEDICINE | Facility: CLINIC | Age: 56
End: 2021-03-15

## 2021-03-15 ENCOUNTER — PATIENT OUTREACH (OUTPATIENT)
Dept: ADMINISTRATIVE | Facility: HOSPITAL | Age: 56
End: 2021-03-15

## 2021-03-19 ENCOUNTER — PATIENT OUTREACH (OUTPATIENT)
Dept: ADMINISTRATIVE | Facility: HOSPITAL | Age: 56
End: 2021-03-19

## 2021-03-22 RX ORDER — LIDOCAINE 50 MG/G
1 PATCH TOPICAL DAILY
Qty: 10 PATCH | Refills: 0 | Status: SHIPPED | OUTPATIENT
Start: 2021-03-22 | End: 2021-04-01

## 2021-04-01 ENCOUNTER — OFFICE VISIT (OUTPATIENT)
Dept: INTERNAL MEDICINE | Facility: CLINIC | Age: 56
End: 2021-04-01
Attending: INTERNAL MEDICINE
Payer: MEDICAID

## 2021-04-01 VITALS
OXYGEN SATURATION: 98 % | DIASTOLIC BLOOD PRESSURE: 68 MMHG | HEIGHT: 66 IN | WEIGHT: 212.06 LBS | BODY MASS INDEX: 34.08 KG/M2 | SYSTOLIC BLOOD PRESSURE: 128 MMHG | HEART RATE: 91 BPM

## 2021-04-01 DIAGNOSIS — S22.32XD CLOSED FRACTURE OF ONE RIB OF LEFT SIDE WITH ROUTINE HEALING, SUBSEQUENT ENCOUNTER: Primary | ICD-10-CM

## 2021-04-01 PROCEDURE — 99213 PR OFFICE/OUTPT VISIT, EST, LEVL III, 20-29 MIN: ICD-10-PCS | Mod: S$PBB,,, | Performed by: INTERNAL MEDICINE

## 2021-04-01 PROCEDURE — 99999 PR PBB SHADOW E&M-EST. PATIENT-LVL III: ICD-10-PCS | Mod: PBBFAC,,, | Performed by: INTERNAL MEDICINE

## 2021-04-01 PROCEDURE — 99999 PR PBB SHADOW E&M-EST. PATIENT-LVL III: CPT | Mod: PBBFAC,,, | Performed by: INTERNAL MEDICINE

## 2021-04-01 PROCEDURE — 99213 OFFICE O/P EST LOW 20 MIN: CPT | Mod: S$PBB,,, | Performed by: INTERNAL MEDICINE

## 2021-04-01 PROCEDURE — 99213 OFFICE O/P EST LOW 20 MIN: CPT | Mod: PBBFAC | Performed by: INTERNAL MEDICINE

## 2021-04-05 ENCOUNTER — TELEPHONE (OUTPATIENT)
Dept: INTERNAL MEDICINE | Facility: CLINIC | Age: 56
End: 2021-04-05

## 2021-04-07 ENCOUNTER — TELEPHONE (OUTPATIENT)
Dept: INTERNAL MEDICINE | Facility: CLINIC | Age: 56
End: 2021-04-07

## 2021-04-08 ENCOUNTER — TELEPHONE (OUTPATIENT)
Dept: INTERNAL MEDICINE | Facility: CLINIC | Age: 56
End: 2021-04-08

## 2021-06-01 ENCOUNTER — TELEPHONE (OUTPATIENT)
Dept: INTERNAL MEDICINE | Facility: CLINIC | Age: 56
End: 2021-06-01

## 2021-07-12 ENCOUNTER — TELEPHONE (OUTPATIENT)
Dept: SURGERY | Facility: CLINIC | Age: 56
End: 2021-07-12

## 2021-07-12 ENCOUNTER — TELEPHONE (OUTPATIENT)
Dept: INTERNAL MEDICINE | Facility: CLINIC | Age: 56
End: 2021-07-12

## 2021-07-12 ENCOUNTER — TELEPHONE (OUTPATIENT)
Dept: OBSTETRICS AND GYNECOLOGY | Facility: CLINIC | Age: 56
End: 2021-07-12

## 2021-07-12 ENCOUNTER — PATIENT MESSAGE (OUTPATIENT)
Dept: OBSTETRICS AND GYNECOLOGY | Facility: CLINIC | Age: 56
End: 2021-07-12

## 2021-07-12 DIAGNOSIS — Z12.31 ENCOUNTER FOR SCREENING MAMMOGRAM FOR MALIGNANT NEOPLASM OF BREAST: Primary | ICD-10-CM

## 2021-07-12 DIAGNOSIS — Z12.11 SCREENING FOR COLON CANCER: Primary | ICD-10-CM

## 2021-07-12 RX ORDER — SODIUM, POTASSIUM,MAG SULFATES 17.5-3.13G
1 SOLUTION, RECONSTITUTED, ORAL ORAL DAILY
Qty: 1 KIT | Refills: 0 | Status: SHIPPED | OUTPATIENT
Start: 2021-07-12 | End: 2021-07-14

## 2021-07-12 RX ORDER — SODIUM CHLORIDE 0.9 % (FLUSH) 0.9 %
3 SYRINGE (ML) INJECTION
Status: CANCELLED | OUTPATIENT
Start: 2021-07-12

## 2021-07-12 RX ORDER — SODIUM CHLORIDE, SODIUM LACTATE, POTASSIUM CHLORIDE, CALCIUM CHLORIDE 600; 310; 30; 20 MG/100ML; MG/100ML; MG/100ML; MG/100ML
INJECTION, SOLUTION INTRAVENOUS CONTINUOUS
Status: CANCELLED | OUTPATIENT
Start: 2021-07-12

## 2021-07-30 ENCOUNTER — TELEPHONE (OUTPATIENT)
Dept: SURGERY | Facility: CLINIC | Age: 56
End: 2021-07-30

## 2021-09-13 ENCOUNTER — PATIENT OUTREACH (OUTPATIENT)
Dept: ADMINISTRATIVE | Facility: OTHER | Age: 56
End: 2021-09-13

## 2021-09-15 ENCOUNTER — HOSPITAL ENCOUNTER (OUTPATIENT)
Dept: RADIOLOGY | Facility: OTHER | Age: 56
Discharge: HOME | End: 2021-09-15
Attending: OBSTETRICS & GYNECOLOGY
Payer: MEDICAID

## 2021-09-15 ENCOUNTER — OFFICE VISIT (OUTPATIENT)
Dept: OBSTETRICS AND GYNECOLOGY | Facility: CLINIC | Age: 56
End: 2021-09-15
Attending: OBSTETRICS & GYNECOLOGY
Payer: MEDICAID

## 2021-09-15 VITALS
HEIGHT: 66 IN | DIASTOLIC BLOOD PRESSURE: 72 MMHG | BODY MASS INDEX: 33.84 KG/M2 | WEIGHT: 210.56 LBS | SYSTOLIC BLOOD PRESSURE: 122 MMHG

## 2021-09-15 DIAGNOSIS — Z12.31 ENCOUNTER FOR SCREENING MAMMOGRAM FOR MALIGNANT NEOPLASM OF BREAST: ICD-10-CM

## 2021-09-15 DIAGNOSIS — Z01.419 WELL FEMALE EXAM WITH ROUTINE GYNECOLOGICAL EXAM: Primary | ICD-10-CM

## 2021-09-15 PROCEDURE — 99213 OFFICE O/P EST LOW 20 MIN: CPT | Mod: PBBFAC | Performed by: OBSTETRICS & GYNECOLOGY

## 2021-09-15 PROCEDURE — 77063 MAMMO DIGITAL SCREENING BILAT WITH TOMO: ICD-10-PCS | Mod: 26,,, | Performed by: RADIOLOGY

## 2021-09-15 PROCEDURE — 77063 BREAST TOMOSYNTHESIS BI: CPT | Mod: 26,,, | Performed by: RADIOLOGY

## 2021-09-15 PROCEDURE — 88175 CYTOPATH C/V AUTO FLUID REDO: CPT | Performed by: OBSTETRICS & GYNECOLOGY

## 2021-09-15 PROCEDURE — 77067 SCR MAMMO BI INCL CAD: CPT | Mod: 26,,, | Performed by: RADIOLOGY

## 2021-09-15 PROCEDURE — 77067 SCR MAMMO BI INCL CAD: CPT | Mod: TC

## 2021-09-15 PROCEDURE — 99999 PR PBB SHADOW E&M-EST. PATIENT-LVL III: CPT | Mod: PBBFAC,,, | Performed by: OBSTETRICS & GYNECOLOGY

## 2021-09-15 PROCEDURE — 77067 MAMMO DIGITAL SCREENING BILAT WITH TOMO: ICD-10-PCS | Mod: 26,,, | Performed by: RADIOLOGY

## 2021-09-15 PROCEDURE — 99999 PR PBB SHADOW E&M-EST. PATIENT-LVL III: ICD-10-PCS | Mod: PBBFAC,,, | Performed by: OBSTETRICS & GYNECOLOGY

## 2021-09-15 PROCEDURE — 99396 PR PREVENTIVE VISIT,EST,40-64: ICD-10-PCS | Mod: S$PBB,,, | Performed by: OBSTETRICS & GYNECOLOGY

## 2021-09-15 PROCEDURE — 99396 PREV VISIT EST AGE 40-64: CPT | Mod: S$PBB,,, | Performed by: OBSTETRICS & GYNECOLOGY

## 2021-09-20 ENCOUNTER — PATIENT MESSAGE (OUTPATIENT)
Dept: SURGERY | Facility: CLINIC | Age: 56
End: 2021-09-20

## 2021-09-20 ENCOUNTER — TELEPHONE (OUTPATIENT)
Dept: SURGERY | Facility: CLINIC | Age: 56
End: 2021-09-20

## 2021-09-20 DIAGNOSIS — Z12.11 SCREENING FOR COLON CANCER: ICD-10-CM

## 2021-09-20 DIAGNOSIS — Z12.11 ENCOUNTER FOR SCREENING COLONOSCOPY: Primary | ICD-10-CM

## 2021-09-20 RX ORDER — SODIUM CHLORIDE 0.9 % (FLUSH) 0.9 %
3 SYRINGE (ML) INJECTION
Status: CANCELLED | OUTPATIENT
Start: 2021-09-20

## 2021-09-23 ENCOUNTER — TELEPHONE (OUTPATIENT)
Dept: OBSTETRICS AND GYNECOLOGY | Facility: CLINIC | Age: 56
End: 2021-09-23

## 2021-09-23 DIAGNOSIS — Z20.2 EXPOSURE TO STD: Primary | ICD-10-CM

## 2021-09-24 RX ORDER — METRONIDAZOLE 500 MG/1
500 TABLET ORAL EVERY 12 HOURS
Qty: 14 TABLET | Refills: 0 | Status: SHIPPED | OUTPATIENT
Start: 2021-09-24 | End: 2021-10-01

## 2021-09-29 ENCOUNTER — TELEPHONE (OUTPATIENT)
Dept: SURGERY | Facility: CLINIC | Age: 56
End: 2021-09-29

## 2021-09-30 ENCOUNTER — PATIENT MESSAGE (OUTPATIENT)
Dept: OBSTETRICS AND GYNECOLOGY | Facility: CLINIC | Age: 56
End: 2021-09-30

## 2021-10-19 ENCOUNTER — TELEPHONE (OUTPATIENT)
Dept: OBSTETRICS AND GYNECOLOGY | Facility: CLINIC | Age: 56
End: 2021-10-19

## 2021-10-19 ENCOUNTER — PATIENT OUTREACH (OUTPATIENT)
Dept: ADMINISTRATIVE | Facility: OTHER | Age: 56
End: 2021-10-19

## 2021-10-19 LAB
CYTOLOGIST CVX/VAG CYTO: NORMAL
CYTOLOGY CVX/VAG DOC CYTO: NORMAL
CYTOLOGY CVX/VAG DOC THIN PREP: NORMAL
CYTOLOGY THINPREP PAP COMMENT: NORMAL
CYTOLOGY THINPREP PAP COMMENT: NORMAL
PAP NOTE: NORMAL
PAP QC REVIEWED BY: NORMAL
PATHOLOGIST CVX/VAG CYTO: NORMAL
STAT OF ADQ CVX/VAG CYTO-IMP: NORMAL

## 2021-10-20 ENCOUNTER — OFFICE VISIT (OUTPATIENT)
Dept: OBSTETRICS AND GYNECOLOGY | Facility: CLINIC | Age: 56
End: 2021-10-20
Attending: OBSTETRICS & GYNECOLOGY
Payer: MEDICAID

## 2021-10-20 ENCOUNTER — LAB VISIT (OUTPATIENT)
Dept: LAB | Facility: OTHER | Age: 56
End: 2021-10-20
Attending: OBSTETRICS & GYNECOLOGY
Payer: MEDICAID

## 2021-10-20 VITALS
WEIGHT: 211 LBS | BODY MASS INDEX: 33.91 KG/M2 | DIASTOLIC BLOOD PRESSURE: 78 MMHG | SYSTOLIC BLOOD PRESSURE: 130 MMHG | HEIGHT: 66 IN

## 2021-10-20 DIAGNOSIS — Z11.3 SCREENING FOR VENEREAL DISEASE: ICD-10-CM

## 2021-10-20 DIAGNOSIS — A59.01 TRICHOMONAS VAGINITIS: ICD-10-CM

## 2021-10-20 DIAGNOSIS — A59.01 TRICHOMONAS VAGINITIS: Primary | ICD-10-CM

## 2021-10-20 LAB
HBV SURFACE AG SERPL QL IA: NEGATIVE
HIV 1+2 AB+HIV1 P24 AG SERPL QL IA: NEGATIVE

## 2021-10-20 PROCEDURE — 87591 N.GONORRHOEAE DNA AMP PROB: CPT | Mod: 59 | Performed by: OBSTETRICS & GYNECOLOGY

## 2021-10-20 PROCEDURE — 99999 PR PBB SHADOW E&M-EST. PATIENT-LVL III: ICD-10-PCS | Mod: PBBFAC,,, | Performed by: OBSTETRICS & GYNECOLOGY

## 2021-10-20 PROCEDURE — 87389 HIV-1 AG W/HIV-1&-2 AB AG IA: CPT | Performed by: OBSTETRICS & GYNECOLOGY

## 2021-10-20 PROCEDURE — 99999 PR PBB SHADOW E&M-EST. PATIENT-LVL III: CPT | Mod: PBBFAC,,, | Performed by: OBSTETRICS & GYNECOLOGY

## 2021-10-20 PROCEDURE — 87491 CHLMYD TRACH DNA AMP PROBE: CPT | Mod: 59 | Performed by: OBSTETRICS & GYNECOLOGY

## 2021-10-20 PROCEDURE — 99213 PR OFFICE/OUTPT VISIT, EST, LEVL III, 20-29 MIN: ICD-10-PCS | Mod: S$PBB,,, | Performed by: OBSTETRICS & GYNECOLOGY

## 2021-10-20 PROCEDURE — 36415 COLL VENOUS BLD VENIPUNCTURE: CPT | Performed by: OBSTETRICS & GYNECOLOGY

## 2021-10-20 PROCEDURE — 86592 SYPHILIS TEST NON-TREP QUAL: CPT | Performed by: OBSTETRICS & GYNECOLOGY

## 2021-10-20 PROCEDURE — 99213 OFFICE O/P EST LOW 20 MIN: CPT | Mod: PBBFAC | Performed by: OBSTETRICS & GYNECOLOGY

## 2021-10-20 PROCEDURE — 99213 OFFICE O/P EST LOW 20 MIN: CPT | Mod: S$PBB,,, | Performed by: OBSTETRICS & GYNECOLOGY

## 2021-10-20 PROCEDURE — 87340 HEPATITIS B SURFACE AG IA: CPT | Performed by: OBSTETRICS & GYNECOLOGY

## 2021-10-20 PROCEDURE — 87481 CANDIDA DNA AMP PROBE: CPT | Mod: 59 | Performed by: OBSTETRICS & GYNECOLOGY

## 2021-10-22 ENCOUNTER — TELEPHONE (OUTPATIENT)
Dept: INTERNAL MEDICINE | Facility: CLINIC | Age: 56
End: 2021-10-22

## 2021-10-22 LAB
BACTERIAL VAGINOSIS DNA: POSITIVE
C TRACH DNA SPEC QL NAA+PROBE: NOT DETECTED
CANDIDA GLABRATA DNA: NEGATIVE
CANDIDA KRUSEI DNA: NEGATIVE
CANDIDA RRNA VAG QL PROBE: NEGATIVE
N GONORRHOEA DNA SPEC QL NAA+PROBE: NOT DETECTED
RPR SER QL: NORMAL
T VAGINALIS RRNA GENITAL QL PROBE: NEGATIVE

## 2021-10-26 ENCOUNTER — OFFICE VISIT (OUTPATIENT)
Dept: URGENT CARE | Facility: CLINIC | Age: 56
End: 2021-10-26
Payer: MEDICAID

## 2021-10-26 VITALS
BODY MASS INDEX: 32.14 KG/M2 | SYSTOLIC BLOOD PRESSURE: 119 MMHG | TEMPERATURE: 98 F | OXYGEN SATURATION: 95 % | RESPIRATION RATE: 14 BRPM | HEART RATE: 79 BPM | WEIGHT: 200 LBS | DIASTOLIC BLOOD PRESSURE: 79 MMHG | HEIGHT: 66 IN

## 2021-10-26 DIAGNOSIS — S62.514A CLOSED NONDISPLACED FRACTURE OF PROXIMAL PHALANX OF RIGHT THUMB, INITIAL ENCOUNTER: Primary | ICD-10-CM

## 2021-10-26 PROCEDURE — 99203 PR OFFICE/OUTPT VISIT, NEW, LEVL III, 30-44 MIN: ICD-10-PCS | Mod: S$GLB,,, | Performed by: PHYSICIAN ASSISTANT

## 2021-10-26 PROCEDURE — 73140 XR FINGER 2 OR MORE VIEWS RIGHT: ICD-10-PCS | Mod: FY,RT,S$GLB, | Performed by: RADIOLOGY

## 2021-10-26 PROCEDURE — 73140 X-RAY EXAM OF FINGER(S): CPT | Mod: FY,RT,S$GLB, | Performed by: RADIOLOGY

## 2021-10-26 PROCEDURE — 99203 OFFICE O/P NEW LOW 30 MIN: CPT | Mod: S$GLB,,, | Performed by: PHYSICIAN ASSISTANT

## 2021-10-26 RX ORDER — NAPROXEN 500 MG/1
500 TABLET ORAL 2 TIMES DAILY WITH MEALS
Qty: 30 TABLET | Refills: 0 | Status: SHIPPED | OUTPATIENT
Start: 2021-10-26 | End: 2021-11-10

## 2022-02-15 ENCOUNTER — TELEPHONE (OUTPATIENT)
Dept: INTERNAL MEDICINE | Facility: CLINIC | Age: 57
End: 2022-02-15
Payer: MEDICAID

## 2022-02-16 NOTE — TELEPHONE ENCOUNTER
LVM for patient to return call to office. Patient is requesting an annual appointment. PCP is not available the next available clinic annual appointment is 5/18/2022.

## 2022-02-16 NOTE — TELEPHONE ENCOUNTER
----- Message from Zhao Ren sent at 2/15/2022 10:43 AM CST -----  Regarding: Appointment  Name of Who is Calling:KT GERARDO [[id number]]    What is the request in detail:Pt calling to schedule annual appt. Unable to schedule patient due to slots not avail. Pt is req a call back.    Can the clinic reply by [facility]:No    What Number to Call Back if not in [facility]:[phone]

## 2022-04-21 ENCOUNTER — TELEPHONE (OUTPATIENT)
Dept: INTERNAL MEDICINE | Facility: CLINIC | Age: 57
End: 2022-04-21
Payer: MEDICAID

## 2022-04-21 NOTE — TELEPHONE ENCOUNTER
----- Message from Taty Daniel sent at 4/21/2022 11:25 AM CDT -----  Regarding: Appt Request  Name of Who is Calling: KT GERARDO [[id number]]           What is the request in detail: Patient is requesting a call back to schedule an appointment for stomach issues. Please assist.           Can the clinic reply by [facility]: NO           What Number to Call Back [facility] not [facility]: [phone]

## 2022-04-26 ENCOUNTER — TELEPHONE (OUTPATIENT)
Dept: INTERNAL MEDICINE | Facility: CLINIC | Age: 57
End: 2022-04-26
Payer: MEDICAID

## 2022-04-26 NOTE — TELEPHONE ENCOUNTER
----- Message from Albert Guo sent at 4/26/2022  2:28 PM CDT -----  Regarding: Reschedule  Contact: KT GERARDO [[id number]]  Name of Who is Calling: KT GERARDO [[id number]]      What is the request in detail: (m) Would like to speak with staff in regards to rescheduling appt. Please advise      Can the clinic reply by [facility]: no      What Number to Call Back if not in [facility]: [phone]

## 2022-05-12 ENCOUNTER — PATIENT MESSAGE (OUTPATIENT)
Dept: SMOKING CESSATION | Facility: CLINIC | Age: 57
End: 2022-05-12
Payer: MEDICAID

## 2022-05-30 ENCOUNTER — PATIENT MESSAGE (OUTPATIENT)
Dept: ADMINISTRATIVE | Facility: HOSPITAL | Age: 57
End: 2022-05-30
Payer: MEDICAID

## 2022-08-31 DIAGNOSIS — I10 ESSENTIAL HYPERTENSION: ICD-10-CM

## 2022-09-15 ENCOUNTER — TELEPHONE (OUTPATIENT)
Dept: INTERNAL MEDICINE | Facility: CLINIC | Age: 57
End: 2022-09-15
Payer: MEDICAID

## 2022-09-15 DIAGNOSIS — Z12.31 SCREENING MAMMOGRAM FOR BREAST CANCER: Primary | ICD-10-CM

## 2022-09-15 NOTE — TELEPHONE ENCOUNTER
----- Message from Albert Guo sent at 9/14/2022  4:08 PM CDT -----  Regarding: Orders  Contact: KT GERARDO [[id number]]  Name of Who is Calling: KT GERARDO [[id number]]      What is the request in detail: Patient would like to have her Mammogram orders put in       Can the clinic reply by [facility]: no      What Number to Call Back if not in [facility]: [phone]

## 2022-09-16 ENCOUNTER — PATIENT OUTREACH (OUTPATIENT)
Dept: ADMINISTRATIVE | Facility: HOSPITAL | Age: 57
End: 2022-09-16
Payer: MEDICAID

## 2022-11-04 ENCOUNTER — HOSPITAL ENCOUNTER (OUTPATIENT)
Dept: RADIOLOGY | Facility: OTHER | Age: 57
Discharge: HOME | End: 2022-11-04
Attending: INTERNAL MEDICINE
Payer: MEDICAID

## 2022-11-04 DIAGNOSIS — Z12.31 SCREENING MAMMOGRAM FOR BREAST CANCER: ICD-10-CM

## 2022-11-04 PROCEDURE — 77063 BREAST TOMOSYNTHESIS BI: CPT | Mod: TC

## 2022-11-04 PROCEDURE — 77063 BREAST TOMOSYNTHESIS BI: CPT | Mod: 26,,, | Performed by: RADIOLOGY

## 2022-11-04 PROCEDURE — 77067 SCR MAMMO BI INCL CAD: CPT | Mod: 26,,, | Performed by: RADIOLOGY

## 2022-11-04 PROCEDURE — 77067 MAMMO DIGITAL SCREENING BILAT WITH TOMO: ICD-10-PCS | Mod: 26,,, | Performed by: RADIOLOGY

## 2022-11-04 PROCEDURE — 77063 MAMMO DIGITAL SCREENING BILAT WITH TOMO: ICD-10-PCS | Mod: 26,,, | Performed by: RADIOLOGY

## 2022-12-14 ENCOUNTER — TELEPHONE (OUTPATIENT)
Dept: ADMINISTRATIVE | Facility: HOSPITAL | Age: 57
End: 2022-12-14
Payer: MEDICAID

## 2022-12-15 ENCOUNTER — TELEPHONE (OUTPATIENT)
Dept: ADMINISTRATIVE | Facility: HOSPITAL | Age: 57
End: 2022-12-15
Payer: MEDICAID

## 2022-12-15 VITALS — DIASTOLIC BLOOD PRESSURE: 95 MMHG | SYSTOLIC BLOOD PRESSURE: 178 MMHG

## 2022-12-15 NOTE — TELEPHONE ENCOUNTER
"Routing to provider w/ note "You saw pt in April 2021. According to not is no longer taking BP meds. Should she do appt asap with anyone in our clinic to get back on meds/do you want her put on a slot on your schedule?   She scheduled w/ [facility] but not until June  Thanks! "  "

## 2022-12-15 NOTE — TELEPHONE ENCOUNTER
Spoke with Ms. Valle whom provided me with a remote BP which was elevated. She stated she ran out of BP medication so she has been take a apple cider vinegar remedy. She would like to be placed back on meds, however she rescheduled her next appt for June due to work schedule.

## 2022-12-15 NOTE — TELEPHONE ENCOUNTER
Spoke with pt and scheduled appt(s) on 12/30/2022 at 1:40 for med refills. Sent override request to [employer].

## 2022-12-30 ENCOUNTER — OFFICE VISIT (OUTPATIENT)
Dept: INTERNAL MEDICINE | Facility: CLINIC | Age: 57
End: 2022-12-30
Attending: INTERNAL MEDICINE
Payer: MEDICAID

## 2022-12-30 ENCOUNTER — TELEPHONE (OUTPATIENT)
Dept: INTERNAL MEDICINE | Facility: CLINIC | Age: 57
End: 2022-12-30
Payer: MEDICAID

## 2022-12-30 VITALS
HEART RATE: 70 BPM | WEIGHT: 207.25 LBS | OXYGEN SATURATION: 98 % | HEIGHT: 66 IN | BODY MASS INDEX: 33.31 KG/M2 | SYSTOLIC BLOOD PRESSURE: 132 MMHG | DIASTOLIC BLOOD PRESSURE: 72 MMHG

## 2022-12-30 DIAGNOSIS — R73.03 PREDIABETES: ICD-10-CM

## 2022-12-30 DIAGNOSIS — I10 ESSENTIAL HYPERTENSION: ICD-10-CM

## 2022-12-30 DIAGNOSIS — R20.2 NUMBNESS AND TINGLING IN BOTH HANDS: ICD-10-CM

## 2022-12-30 DIAGNOSIS — F17.200 CURRENT SMOKER: ICD-10-CM

## 2022-12-30 DIAGNOSIS — Z00.00 ANNUAL PHYSICAL EXAM: Primary | ICD-10-CM

## 2022-12-30 DIAGNOSIS — K59.00 CONSTIPATION, UNSPECIFIED CONSTIPATION TYPE: ICD-10-CM

## 2022-12-30 DIAGNOSIS — E66.9 OBESITY (BMI 30.0-34.9): ICD-10-CM

## 2022-12-30 DIAGNOSIS — R20.0 NUMBNESS AND TINGLING IN BOTH HANDS: ICD-10-CM

## 2022-12-30 DIAGNOSIS — F17.200 TOBACCO USE DISORDER: ICD-10-CM

## 2022-12-30 PROCEDURE — 4010F ACE/ARB THERAPY RXD/TAKEN: CPT | Mod: CPTII,,, | Performed by: INTERNAL MEDICINE

## 2022-12-30 PROCEDURE — 99999 PR PBB SHADOW E&M-EST. PATIENT-LVL III: CPT | Mod: PBBFAC,,, | Performed by: INTERNAL MEDICINE

## 2022-12-30 PROCEDURE — 99999 PR PBB SHADOW E&M-EST. PATIENT-LVL III: ICD-10-PCS | Mod: PBBFAC,,, | Performed by: INTERNAL MEDICINE

## 2022-12-30 PROCEDURE — 3008F BODY MASS INDEX DOCD: CPT | Mod: CPTII,,, | Performed by: INTERNAL MEDICINE

## 2022-12-30 PROCEDURE — 3075F PR MOST RECENT SYSTOLIC BLOOD PRESS GE 130-139MM HG: ICD-10-PCS | Mod: CPTII,,, | Performed by: INTERNAL MEDICINE

## 2022-12-30 PROCEDURE — 1160F RVW MEDS BY RX/DR IN RCRD: CPT | Mod: CPTII,,, | Performed by: INTERNAL MEDICINE

## 2022-12-30 PROCEDURE — 99214 OFFICE O/P EST MOD 30 MIN: CPT | Mod: S$PBB,,, | Performed by: INTERNAL MEDICINE

## 2022-12-30 PROCEDURE — 3078F DIAST BP <80 MM HG: CPT | Mod: CPTII,,, | Performed by: INTERNAL MEDICINE

## 2022-12-30 PROCEDURE — 4010F PR ACE/ARB THEARPY RXD/TAKEN: ICD-10-PCS | Mod: CPTII,,, | Performed by: INTERNAL MEDICINE

## 2022-12-30 PROCEDURE — 1160F PR REVIEW ALL MEDS BY PRESCRIBER/CLIN PHARMACIST DOCUMENTED: ICD-10-PCS | Mod: CPTII,,, | Performed by: INTERNAL MEDICINE

## 2022-12-30 PROCEDURE — 3078F PR MOST RECENT DIASTOLIC BLOOD PRESSURE < 80 MM HG: ICD-10-PCS | Mod: CPTII,,, | Performed by: INTERNAL MEDICINE

## 2022-12-30 PROCEDURE — 1159F PR MEDICATION LIST DOCUMENTED IN MEDICAL RECORD: ICD-10-PCS | Mod: CPTII,,, | Performed by: INTERNAL MEDICINE

## 2022-12-30 PROCEDURE — 3075F SYST BP GE 130 - 139MM HG: CPT | Mod: CPTII,,, | Performed by: INTERNAL MEDICINE

## 2022-12-30 PROCEDURE — 99214 PR OFFICE/OUTPT VISIT, EST, LEVL IV, 30-39 MIN: ICD-10-PCS | Mod: S$PBB,,, | Performed by: INTERNAL MEDICINE

## 2022-12-30 PROCEDURE — 1159F MED LIST DOCD IN RCRD: CPT | Mod: CPTII,,, | Performed by: INTERNAL MEDICINE

## 2022-12-30 PROCEDURE — 3008F PR BODY MASS INDEX (BMI) DOCUMENTED: ICD-10-PCS | Mod: CPTII,,, | Performed by: INTERNAL MEDICINE

## 2022-12-30 PROCEDURE — 99213 OFFICE O/P EST LOW 20 MIN: CPT | Mod: PBBFAC | Performed by: INTERNAL MEDICINE

## 2022-12-30 RX ORDER — PRAVASTATIN SODIUM 40 MG/1
40 TABLET ORAL DAILY
Qty: 90 TABLET | Refills: 3 | Status: SHIPPED | OUTPATIENT
Start: 2022-12-30 | End: 2023-01-03

## 2022-12-30 RX ORDER — METFORMIN HYDROCHLORIDE 500 MG/1
500 TABLET ORAL 2 TIMES DAILY WITH MEALS
Qty: 180 TABLET | Refills: 3 | Status: SHIPPED | OUTPATIENT
Start: 2022-12-30 | End: 2023-07-14

## 2022-12-30 RX ORDER — IRBESARTAN 300 MG/1
300 TABLET ORAL NIGHTLY
Qty: 90 TABLET | Refills: 2 | Status: SHIPPED | OUTPATIENT
Start: 2022-12-30 | End: 2023-12-12

## 2022-12-30 NOTE — PROGRESS NOTES
Subjective:       Patient ID: Radha Valle is a 57 y.o. female.    Chief Complaint: Medication Refill    Here for annual exam    Patient presents today for routine evaluation, physical, and labs. Patient has no major concerns or complaints today.       ### HTN ###  Irbesartan 300mg    ### PreDM ###  Metformin 500mg BID       HGBA1C                   6.0 (H)             08/14/2019 02:03 PM        HGBA1C                   6.3 (H)             04/01/2019 10:23 AM        HGBA1C                   6.1 (H)             08/28/2018 10:23 AM        HGBA1C                   5.9 (H)             08/01/2017 09:50 AM        HGBA1C                   6.4 (H)             11/17/2016 08:40 AM     ### obesity ###  12/30/22 : 33.45 kg/m²  10/26/21 : 32.28 kg/m²  10/20/21 : 34.05 kg/m²  09/30/21 : 32.65 kg/m²  09/15/21 : 33.98 kg/m²  04/01/21 : 34.23 kg/m²  03/08/21 : 33.06 kg/m²      ### Tobacco Use ###  Started age 15; 1pk/day; Current Smoker  Shared decision making discussed. Pt amenable to annual lung CA screening with low dose CT   Pt denies SOB at rest or LARES, PND, chest tightness, wheezing, chronic cough, hemoptysis, night sweats, or unexpected weight loss.    ### Constipation ###  Linactolide           Review of Systems   Constitutional:  Negative for chills, fatigue, fever and unexpected weight change.   HENT:  Negative for ear pain, hearing loss, postnasal drip, tinnitus, trouble swallowing and voice change.    Respiratory:  Negative for cough, chest tightness, shortness of breath and wheezing.    Cardiovascular:  Negative for chest pain, palpitations and leg swelling.   Gastrointestinal:  Negative for abdominal pain, blood in stool, diarrhea, nausea and vomiting.   Endocrine: Negative for polydipsia, polyphagia and polyuria.   Genitourinary:  Negative for difficulty urinating, dysuria, hematuria and vaginal bleeding.   Skin:  Negative for rash.   Allergic/Immunologic: Negative for food allergies.   Neurological:   "Negative for dizziness, numbness and headaches.   Hematological:  Does not bruise/bleed easily.   Psychiatric/Behavioral:  The patient is not nervous/anxious.      Objective:      Vitals:    12/30/22 1155   BP: 132/72   Pulse: 70   SpO2: 98%   Weight: 94 kg (207 lb 3.7 oz)   Height: 5' 6" (1.676 m)      Physical Exam  Vitals and nursing note reviewed.   Constitutional:       General: She is not in acute distress.     Appearance: Normal appearance. She is well-developed.   HENT:      Head: Normocephalic and atraumatic.      Mouth/Throat:      Pharynx: No oropharyngeal exudate.   Eyes:      General: No scleral icterus.     Conjunctiva/sclera: Conjunctivae normal.      Pupils: Pupils are equal, round, and reactive to light.   Neck:      Thyroid: No thyromegaly.   Cardiovascular:      Rate and Rhythm: Normal rate and regular rhythm.      Heart sounds: Normal heart sounds. No murmur heard.  Pulmonary:      Effort: Pulmonary effort is normal.      Breath sounds: Normal breath sounds. No wheezing or rales.   Abdominal:      General: There is no distension.   Musculoskeletal:         General: No tenderness.   Lymphadenopathy:      Cervical: No cervical adenopathy.   Skin:     General: Skin is warm and dry.   Neurological:      Mental Status: She is alert and oriented to person, place, and time.   Psychiatric:         Behavior: Behavior normal.       Assessment:       1. Prediabetes    2. Annual physical exam    3. Essential hypertension    4. Numbness and tingling in both hands    5. Constipation, unspecified constipation type    6. Current smoker    7. Tobacco use disorder    8. Obesity (BMI 30.0-34.9)          Plan:       Radha was seen today for medication refill.    Diagnoses and all orders for this visit:    Prediabetes  -     Hemoglobin A1C; Future  -     metFORMIN (GLUCOPHAGE) 500 MG tablet; Take 1 tablet (500 mg total) by mouth 2 (two) times daily with meals.    Annual physical exam  -     CBC Auto Differential; " Future  -     Comprehensive Metabolic Panel; Future  -     Hemoglobin A1C; Future  -     Lipid Panel; Future  -     TSH; Future  -     Vitamin D; Future    Essential hypertension  -     irbesartan (AVAPRO) 300 MG tablet; Take 1 tablet (300 mg total) by mouth every evening.    Numbness and tingling in both hands  -     Vitamin B12; Future  -     RPR; Future    Constipation, unspecified constipation type  -     linaCLOtide (LINZESS) 72 mcg Cap capsule; Take 1 capsule (72 mcg total) by mouth once daily.    Current smoker  -     CT Chest Lung Screening Low Dose; Future    Tobacco use disorder    Obesity (BMI 30.0-34.9)    Other orders  -     pravastatin (PRAVACHOL) 40 MG tablet; Take 1 tablet (40 mg total) by mouth once daily.           Nakul Grady MD  Internal Medicine-[facility]        Side effects of medication(s) were discussed in detail and patient voiced understanding.  Patient will call back for any issues or complications.

## 2022-12-30 NOTE — TELEPHONE ENCOUNTER
PT INFORMED MESSAGE BELOW:   informing pt per Dr. Jin that their more than welcome to come in earlier than their scheduled appt time on today. Anytime before their scheduled appt would be great.    Pt states she's on her way now

## 2023-01-12 ENCOUNTER — TELEPHONE (OUTPATIENT)
Dept: INTERNAL MEDICINE | Facility: CLINIC | Age: 58
End: 2023-01-12
Payer: MEDICAID

## 2023-01-12 NOTE — TELEPHONE ENCOUNTER
Farida MILLER Request    linaCLOtide (LINZESS) 72 mcg Cap capsule  Take 1 capsule (72 mcg total) by mouth once daily., Starting Fri 12/30/2022, Normal   Dispense: 90 capsule   Refills: 0 ordered   Pharmacy: [facility] [address] [place] [address] [address] [place] [address] (Ph: [phone])     Called pharmacy above to inform pharmacy to  run the medication through the primary insurance.     Refill too soon per pharmacy she states that patient could refill medication on 1/25/2023. Thanks.

## 2023-02-10 ENCOUNTER — HOSPITAL ENCOUNTER (OUTPATIENT)
Dept: RADIOLOGY | Facility: OTHER | Age: 58
Discharge: HOME | End: 2023-02-10
Attending: INTERNAL MEDICINE
Payer: MEDICAID

## 2023-02-10 PROCEDURE — 71271 CT CHEST LUNG SCREENING LOW DOSE: ICD-10-PCS | Mod: 26,,, | Performed by: RADIOLOGY

## 2023-02-10 PROCEDURE — 71271 CT THORAX LUNG CANCER SCR C-: CPT | Mod: 26,,, | Performed by: RADIOLOGY

## 2023-02-10 PROCEDURE — 71271 CT THORAX LUNG CANCER SCR C-: CPT | Mod: TC

## 2023-06-30 ENCOUNTER — OFFICE VISIT (OUTPATIENT)
Dept: INTERNAL MEDICINE | Facility: CLINIC | Age: 58
End: 2023-06-30
Attending: INTERNAL MEDICINE
Payer: MEDICAID

## 2023-06-30 VITALS
WEIGHT: 208.56 LBS | BODY MASS INDEX: 33.52 KG/M2 | HEART RATE: 76 BPM | DIASTOLIC BLOOD PRESSURE: 70 MMHG | OXYGEN SATURATION: 98 % | SYSTOLIC BLOOD PRESSURE: 130 MMHG | HEIGHT: 66 IN

## 2023-06-30 DIAGNOSIS — E78.2 MIXED HYPERLIPIDEMIA: ICD-10-CM

## 2023-06-30 DIAGNOSIS — I10 ESSENTIAL HYPERTENSION: ICD-10-CM

## 2023-06-30 DIAGNOSIS — R73.03 PREDIABETES: ICD-10-CM

## 2023-06-30 DIAGNOSIS — R25.2 MUSCLE CRAMPS: Primary | ICD-10-CM

## 2023-06-30 PROBLEM — E78.5 HLD (HYPERLIPIDEMIA): Status: ACTIVE | Noted: 2023-06-30

## 2023-06-30 PROCEDURE — 4010F ACE/ARB THERAPY RXD/TAKEN: CPT | Mod: CPTII,,, | Performed by: INTERNAL MEDICINE

## 2023-06-30 PROCEDURE — 1160F PR REVIEW ALL MEDS BY PRESCRIBER/CLIN PHARMACIST DOCUMENTED: ICD-10-PCS | Mod: CPTII,,, | Performed by: INTERNAL MEDICINE

## 2023-06-30 PROCEDURE — 3078F PR MOST RECENT DIASTOLIC BLOOD PRESSURE < 80 MM HG: ICD-10-PCS | Mod: CPTII,,, | Performed by: INTERNAL MEDICINE

## 2023-06-30 PROCEDURE — 99214 PR OFFICE/OUTPT VISIT, EST, LEVL IV, 30-39 MIN: ICD-10-PCS | Mod: S$PBB,,, | Performed by: INTERNAL MEDICINE

## 2023-06-30 PROCEDURE — 99214 OFFICE O/P EST MOD 30 MIN: CPT | Mod: S$PBB,,, | Performed by: INTERNAL MEDICINE

## 2023-06-30 PROCEDURE — 99999 PR PBB SHADOW E&M-EST. PATIENT-LVL III: ICD-10-PCS | Mod: PBBFAC,,, | Performed by: INTERNAL MEDICINE

## 2023-06-30 PROCEDURE — 3078F DIAST BP <80 MM HG: CPT | Mod: CPTII,,, | Performed by: INTERNAL MEDICINE

## 2023-06-30 PROCEDURE — 99213 OFFICE O/P EST LOW 20 MIN: CPT | Mod: PBBFAC | Performed by: INTERNAL MEDICINE

## 2023-06-30 PROCEDURE — 3008F BODY MASS INDEX DOCD: CPT | Mod: CPTII,,, | Performed by: INTERNAL MEDICINE

## 2023-06-30 PROCEDURE — 99999 PR PBB SHADOW E&M-EST. PATIENT-LVL III: CPT | Mod: PBBFAC,,, | Performed by: INTERNAL MEDICINE

## 2023-06-30 PROCEDURE — 3008F PR BODY MASS INDEX (BMI) DOCUMENTED: ICD-10-PCS | Mod: CPTII,,, | Performed by: INTERNAL MEDICINE

## 2023-06-30 PROCEDURE — 1159F PR MEDICATION LIST DOCUMENTED IN MEDICAL RECORD: ICD-10-PCS | Mod: CPTII,,, | Performed by: INTERNAL MEDICINE

## 2023-06-30 PROCEDURE — 3075F PR MOST RECENT SYSTOLIC BLOOD PRESS GE 130-139MM HG: ICD-10-PCS | Mod: CPTII,,, | Performed by: INTERNAL MEDICINE

## 2023-06-30 PROCEDURE — 1160F RVW MEDS BY RX/DR IN RCRD: CPT | Mod: CPTII,,, | Performed by: INTERNAL MEDICINE

## 2023-06-30 PROCEDURE — 4010F PR ACE/ARB THEARPY RXD/TAKEN: ICD-10-PCS | Mod: CPTII,,, | Performed by: INTERNAL MEDICINE

## 2023-06-30 PROCEDURE — 3075F SYST BP GE 130 - 139MM HG: CPT | Mod: CPTII,,, | Performed by: INTERNAL MEDICINE

## 2023-06-30 PROCEDURE — 1159F MED LIST DOCD IN RCRD: CPT | Mod: CPTII,,, | Performed by: INTERNAL MEDICINE

## 2023-06-30 NOTE — PROGRESS NOTES
Subjective:       Patient ID: Radha Valle is a 58 y.o. female.    Chief Complaint: Hypertension    Here for routine f/u    Lung cancer screening showed emphysema so she quit smoking cigarettes instantly. Chalk one up to the indirect benefit of screening services.     Within 7-10 days of starting lipitor 40mg she has been having cramps of her thighs and calves at night, 3-4 times a week.           ### HTN ###  Irbesartan 300mg     ### PreDM ###  Metformin 500mg BID       HGBA1C                   6.0 (H)             08/14/2019 02:03 PM        HGBA1C                   6.3 (H)             04/01/2019 10:23 AM        HGBA1C                   6.1 (H)             08/28/2018 10:23 AM        HGBA1C                   5.9 (H)             08/01/2017 09:50 AM        HGBA1C                   6.4 (H)             11/17/2016 08:40 AM      ### obesity ###  12/30/22 : 33.45 kg/m²  10/26/21 : 32.28 kg/m²  10/20/21 : 34.05 kg/m²  09/30/21 : 32.65 kg/m²  09/15/21 : 33.98 kg/m²  04/01/21 : 34.23 kg/m²  03/08/21 : 33.06 kg/m²        ### Tobacco Use Emphysema (CT)###  Started age 15; 1pk/day;   Quit 03/2023  Shared decision making discussed. Pt amenable to annual lung CA screening with low dose CT   Pt denies SOB at rest or LARES, PND, chest tightness, wheezing, chronic cough, hemoptysis, night sweats, or unexpected weight loss.     ### Constipation ###  Linactolide         Review of Systems   Constitutional:  Negative for chills, fatigue, fever and unexpected weight change.   HENT:  Negative for ear pain, hearing loss, postnasal drip, tinnitus, trouble swallowing and voice change.    Respiratory:  Negative for cough, chest tightness, shortness of breath and wheezing.    Cardiovascular:  Negative for chest pain, palpitations and leg swelling.   Gastrointestinal:  Negative for abdominal pain, blood in stool, diarrhea, nausea and vomiting.   Endocrine: Negative for polydipsia, polyphagia and polyuria.   Genitourinary:  Negative for  "difficulty urinating, dysuria, hematuria and vaginal bleeding.   Skin:  Negative for rash.   Allergic/Immunologic: Negative for food allergies.   Neurological:  Negative for dizziness, numbness and headaches.   Hematological:  Does not bruise/bleed easily.   Psychiatric/Behavioral:  The patient is not nervous/anxious.      Objective:      Vitals:    06/30/23 1046   BP: 130/70   Pulse: 76   SpO2: 98%   Weight: 94.6 kg (208 lb 8.9 oz)   Height: 5' 6" (1.676 m)      Physical Exam  Vitals and nursing note reviewed.   Constitutional:       General: She is not in acute distress.     Appearance: Normal appearance. She is well-developed.   HENT:      Head: Normocephalic and atraumatic.      Mouth/Throat:      Pharynx: No oropharyngeal exudate.   Eyes:      General: No scleral icterus.     Conjunctiva/sclera: Conjunctivae normal.      Pupils: Pupils are equal, round, and reactive to light.   Neck:      Thyroid: No thyromegaly.   Cardiovascular:      Rate and Rhythm: Normal rate and regular rhythm.      Heart sounds: Normal heart sounds. No murmur heard.  Pulmonary:      Effort: Pulmonary effort is normal.      Breath sounds: Normal breath sounds. No wheezing or rales.   Abdominal:      General: There is no distension.   Musculoskeletal:         General: No tenderness.   Lymphadenopathy:      Cervical: No cervical adenopathy.   Skin:     General: Skin is warm and dry.   Neurological:      Mental Status: She is alert and oriented to person, place, and time.   Psychiatric:         Behavior: Behavior normal.       Assessment:       1. Muscle cramps    2. Prediabetes    3. Essential hypertension    4. Mixed hyperlipidemia        Plan:       Radha was seen today for hypertension.    Diagnoses and all orders for this visit:    Muscle cramps  -     Magnesium; Future  -     Comprehensive Metabolic Panel; Future    Prediabetes  -     Hemoglobin A1C; Future    Essential hypertension   Controlled and asymptomatic.  Continue current " Rx regimen.    Mixed hyperlipidemia   Hold statin for 4 weeks and monitor. If no cramps then restart and monitor. If cramps resume then reduce to 40mg and monitor and contact us.          Nakul Grady MD  Internal Medicine-[facility]        Side effects of medication(s) were discussed in detail and patient voiced understanding.  Patient will call back for any issues or complications.

## 2023-07-07 ENCOUNTER — LAB VISIT (OUTPATIENT)
Dept: LAB | Facility: OTHER | Age: 58
End: 2023-07-07
Attending: INTERNAL MEDICINE
Payer: MEDICAID

## 2023-07-07 DIAGNOSIS — R25.2 MUSCLE CRAMPS: ICD-10-CM

## 2023-07-07 DIAGNOSIS — R73.03 PREDIABETES: ICD-10-CM

## 2023-07-07 LAB
ALBUMIN SERPL BCP-MCNC: 3.4 G/DL (ref 3.5–5.2)
ALP SERPL-CCNC: 179 U/L (ref 55–135)
ALT SERPL W/O P-5'-P-CCNC: 26 U/L (ref 10–44)
ANION GAP SERPL CALC-SCNC: 8 MMOL/L (ref 8–16)
AST SERPL-CCNC: 19 U/L (ref 10–40)
BILIRUB SERPL-MCNC: 0.2 MG/DL (ref 0.1–1)
BUN SERPL-MCNC: 9 MG/DL (ref 6–20)
CALCIUM SERPL-MCNC: 9.2 MG/DL (ref 8.7–10.5)
CHLORIDE SERPL-SCNC: 105 MMOL/L (ref 95–110)
CO2 SERPL-SCNC: 26 MMOL/L (ref 23–29)
CREAT SERPL-MCNC: 0.8 MG/DL (ref 0.5–1.4)
EST. GFR  (NO RACE VARIABLE): >60 ML/MIN/1.73 M^2
ESTIMATED AVG GLUCOSE: 169 MG/DL (ref 68–131)
GLUCOSE SERPL-MCNC: 216 MG/DL (ref 70–110)
HBA1C MFR BLD: 7.5 % (ref 4–5.6)
MAGNESIUM SERPL-MCNC: 1.6 MG/DL (ref 1.6–2.6)
POTASSIUM SERPL-SCNC: 4 MMOL/L (ref 3.5–5.1)
PROT SERPL-MCNC: 7 G/DL (ref 6–8.4)
SODIUM SERPL-SCNC: 139 MMOL/L (ref 136–145)

## 2023-07-07 PROCEDURE — 83735 ASSAY OF MAGNESIUM: CPT | Performed by: INTERNAL MEDICINE

## 2023-07-07 PROCEDURE — 36415 COLL VENOUS BLD VENIPUNCTURE: CPT | Performed by: INTERNAL MEDICINE

## 2023-07-07 PROCEDURE — 83036 HEMOGLOBIN GLYCOSYLATED A1C: CPT | Performed by: INTERNAL MEDICINE

## 2023-07-07 PROCEDURE — 80053 COMPREHEN METABOLIC PANEL: CPT | Performed by: INTERNAL MEDICINE

## 2023-07-14 NOTE — PROGRESS NOTES
Your diabetes is un-controlled, defined by a hemoglobin A1c greater than 7%. This definition was made because those who keep their A1c consistently below 7% have a much, much less chance of developing the complications of diabetes or progression of their diabetes one day.  Complications include but are not limited to increased risk of heart attacks, strokes, blindness, kidney dysfunction, chronic stomach problems, and a permanent, painful neuropathy.     To avoid the above I would like to increase your metformin to two tablets in the morning and 2 in the evening.     Please make sure you are looking at all nutritional labels and please continue to reduce your consumption of simple sugars and processed complex carbohydrates. Weight loss is a good recommendation for most of my patients. We need to repeat your A1c in 2-3 months.

## 2023-09-27 DIAGNOSIS — E11.9 TYPE 2 DIABETES MELLITUS WITHOUT COMPLICATION, UNSPECIFIED WHETHER LONG TERM INSULIN USE: ICD-10-CM

## 2023-10-11 ENCOUNTER — LAB VISIT (OUTPATIENT)
Dept: LAB | Facility: OTHER | Age: 58
End: 2023-10-11
Attending: INTERNAL MEDICINE
Payer: MEDICAID

## 2023-10-11 ENCOUNTER — OFFICE VISIT (OUTPATIENT)
Dept: INTERNAL MEDICINE | Facility: CLINIC | Age: 58
End: 2023-10-11
Attending: INTERNAL MEDICINE
Payer: MEDICAID

## 2023-10-11 VITALS
TEMPERATURE: 99 F | DIASTOLIC BLOOD PRESSURE: 78 MMHG | SYSTOLIC BLOOD PRESSURE: 130 MMHG | WEIGHT: 217.38 LBS | OXYGEN SATURATION: 98 % | HEIGHT: 66 IN | BODY MASS INDEX: 34.93 KG/M2

## 2023-10-11 DIAGNOSIS — E55.9 VITAMIN D DEFICIENCY: ICD-10-CM

## 2023-10-11 DIAGNOSIS — R74.8 ELEVATED ALKALINE PHOSPHATASE LEVEL: ICD-10-CM

## 2023-10-11 DIAGNOSIS — I10 ESSENTIAL HYPERTENSION: ICD-10-CM

## 2023-10-11 DIAGNOSIS — E11.9 CONTROLLED TYPE 2 DIABETES MELLITUS WITHOUT COMPLICATION, WITHOUT LONG-TERM CURRENT USE OF INSULIN: ICD-10-CM

## 2023-10-11 DIAGNOSIS — E78.2 MIXED HYPERLIPIDEMIA: Primary | ICD-10-CM

## 2023-10-11 DIAGNOSIS — Z87.891 FORMER SMOKER: ICD-10-CM

## 2023-10-11 LAB
25(OH)D3+25(OH)D2 SERPL-MCNC: 31 NG/ML (ref 30–96)
ALBUMIN SERPL BCP-MCNC: 3.6 G/DL (ref 3.5–5.2)
ALP SERPL-CCNC: 189 U/L (ref 55–135)
ALT SERPL W/O P-5'-P-CCNC: 34 U/L (ref 10–44)
ANION GAP SERPL CALC-SCNC: 9 MMOL/L (ref 8–16)
AST SERPL-CCNC: 22 U/L (ref 10–40)
BASOPHILS # BLD AUTO: 0.03 K/UL (ref 0–0.2)
BASOPHILS NFR BLD: 0.4 % (ref 0–1.9)
BILIRUB SERPL-MCNC: 0.3 MG/DL (ref 0.1–1)
BUN SERPL-MCNC: 13 MG/DL (ref 6–20)
CALCIUM SERPL-MCNC: 10 MG/DL (ref 8.7–10.5)
CHLORIDE SERPL-SCNC: 106 MMOL/L (ref 95–110)
CHOLEST SERPL-MCNC: 225 MG/DL (ref 120–199)
CHOLEST/HDLC SERPL: 3.5 {RATIO} (ref 2–5)
CO2 SERPL-SCNC: 26 MMOL/L (ref 23–29)
CREAT SERPL-MCNC: 0.8 MG/DL (ref 0.5–1.4)
DIFFERENTIAL METHOD: ABNORMAL
EOSINOPHIL # BLD AUTO: 0.3 K/UL (ref 0–0.5)
EOSINOPHIL NFR BLD: 3.5 % (ref 0–8)
ERYTHROCYTE [DISTWIDTH] IN BLOOD BY AUTOMATED COUNT: 16.4 % (ref 11.5–14.5)
EST. GFR  (NO RACE VARIABLE): >60 ML/MIN/1.73 M^2
ESTIMATED AVG GLUCOSE: 177 MG/DL (ref 68–131)
GLUCOSE SERPL-MCNC: 116 MG/DL (ref 70–110)
HBA1C MFR BLD: 7.8 % (ref 4–5.6)
HCT VFR BLD AUTO: 38.9 % (ref 37–48.5)
HDLC SERPL-MCNC: 65 MG/DL (ref 40–75)
HDLC SERPL: 28.9 % (ref 20–50)
HGB BLD-MCNC: 11.8 G/DL (ref 12–16)
IMM GRANULOCYTES # BLD AUTO: 0.04 K/UL (ref 0–0.04)
IMM GRANULOCYTES NFR BLD AUTO: 0.5 % (ref 0–0.5)
LDLC SERPL CALC-MCNC: 140 MG/DL (ref 63–159)
LYMPHOCYTES # BLD AUTO: 2.5 K/UL (ref 1–4.8)
LYMPHOCYTES NFR BLD: 33.1 % (ref 18–48)
MCH RBC QN AUTO: 24.1 PG (ref 27–31)
MCHC RBC AUTO-ENTMCNC: 30.3 G/DL (ref 32–36)
MCV RBC AUTO: 79 FL (ref 82–98)
MONOCYTES # BLD AUTO: 0.5 K/UL (ref 0.3–1)
MONOCYTES NFR BLD: 6 % (ref 4–15)
NEUTROPHILS # BLD AUTO: 4.3 K/UL (ref 1.8–7.7)
NEUTROPHILS NFR BLD: 56.5 % (ref 38–73)
NONHDLC SERPL-MCNC: 160 MG/DL
NRBC BLD-RTO: 0 /100 WBC
PLATELET # BLD AUTO: 290 K/UL (ref 150–450)
PMV BLD AUTO: 10.7 FL (ref 9.2–12.9)
POTASSIUM SERPL-SCNC: 4.9 MMOL/L (ref 3.5–5.1)
PROT SERPL-MCNC: 7.8 G/DL (ref 6–8.4)
RBC # BLD AUTO: 4.9 M/UL (ref 4–5.4)
SODIUM SERPL-SCNC: 141 MMOL/L (ref 136–145)
TRIGL SERPL-MCNC: 100 MG/DL (ref 30–150)
TSH SERPL DL<=0.005 MIU/L-ACNC: 0.66 UIU/ML (ref 0.4–4)
WBC # BLD AUTO: 7.67 K/UL (ref 3.9–12.7)

## 2023-10-11 PROCEDURE — 80061 LIPID PANEL: CPT | Performed by: INTERNAL MEDICINE

## 2023-10-11 PROCEDURE — 3078F DIAST BP <80 MM HG: CPT | Mod: CPTII,,, | Performed by: INTERNAL MEDICINE

## 2023-10-11 PROCEDURE — 3078F PR MOST RECENT DIASTOLIC BLOOD PRESSURE < 80 MM HG: ICD-10-PCS | Mod: CPTII,,, | Performed by: INTERNAL MEDICINE

## 2023-10-11 PROCEDURE — 1159F PR MEDICATION LIST DOCUMENTED IN MEDICAL RECORD: ICD-10-PCS | Mod: CPTII,,, | Performed by: INTERNAL MEDICINE

## 2023-10-11 PROCEDURE — 1159F MED LIST DOCD IN RCRD: CPT | Mod: CPTII,,, | Performed by: INTERNAL MEDICINE

## 2023-10-11 PROCEDURE — 99213 OFFICE O/P EST LOW 20 MIN: CPT | Mod: PBBFAC | Performed by: INTERNAL MEDICINE

## 2023-10-11 PROCEDURE — 1160F RVW MEDS BY RX/DR IN RCRD: CPT | Mod: CPTII,,, | Performed by: INTERNAL MEDICINE

## 2023-10-11 PROCEDURE — 99999 PR PBB SHADOW E&M-EST. PATIENT-LVL III: ICD-10-PCS | Mod: PBBFAC,,, | Performed by: INTERNAL MEDICINE

## 2023-10-11 PROCEDURE — 85025 COMPLETE CBC W/AUTO DIFF WBC: CPT | Performed by: INTERNAL MEDICINE

## 2023-10-11 PROCEDURE — 3075F PR MOST RECENT SYSTOLIC BLOOD PRESS GE 130-139MM HG: ICD-10-PCS | Mod: CPTII,,, | Performed by: INTERNAL MEDICINE

## 2023-10-11 PROCEDURE — 3051F PR MOST RECENT HEMOGLOBIN A1C LEVEL 7.0 - < 8.0%: ICD-10-PCS | Mod: CPTII,,, | Performed by: INTERNAL MEDICINE

## 2023-10-11 PROCEDURE — 3075F SYST BP GE 130 - 139MM HG: CPT | Mod: CPTII,,, | Performed by: INTERNAL MEDICINE

## 2023-10-11 PROCEDURE — 82306 VITAMIN D 25 HYDROXY: CPT | Performed by: INTERNAL MEDICINE

## 2023-10-11 PROCEDURE — 3008F BODY MASS INDEX DOCD: CPT | Mod: CPTII,,, | Performed by: INTERNAL MEDICINE

## 2023-10-11 PROCEDURE — 84443 ASSAY THYROID STIM HORMONE: CPT | Performed by: INTERNAL MEDICINE

## 2023-10-11 PROCEDURE — 99214 OFFICE O/P EST MOD 30 MIN: CPT | Mod: S$PBB,,, | Performed by: INTERNAL MEDICINE

## 2023-10-11 PROCEDURE — 3051F HG A1C>EQUAL 7.0%<8.0%: CPT | Mod: CPTII,,, | Performed by: INTERNAL MEDICINE

## 2023-10-11 PROCEDURE — 99214 PR OFFICE/OUTPT VISIT, EST, LEVL IV, 30-39 MIN: ICD-10-PCS | Mod: S$PBB,,, | Performed by: INTERNAL MEDICINE

## 2023-10-11 PROCEDURE — 4010F ACE/ARB THERAPY RXD/TAKEN: CPT | Mod: CPTII,,, | Performed by: INTERNAL MEDICINE

## 2023-10-11 PROCEDURE — 3008F PR BODY MASS INDEX (BMI) DOCUMENTED: ICD-10-PCS | Mod: CPTII,,, | Performed by: INTERNAL MEDICINE

## 2023-10-11 PROCEDURE — 1160F PR REVIEW ALL MEDS BY PRESCRIBER/CLIN PHARMACIST DOCUMENTED: ICD-10-PCS | Mod: CPTII,,, | Performed by: INTERNAL MEDICINE

## 2023-10-11 PROCEDURE — 84075 ASSAY ALKALINE PHOSPHATASE: CPT | Mod: 91 | Performed by: INTERNAL MEDICINE

## 2023-10-11 PROCEDURE — 84080 ASSAY ALKALINE PHOSPHATASES: CPT | Performed by: INTERNAL MEDICINE

## 2023-10-11 PROCEDURE — 83036 HEMOGLOBIN GLYCOSYLATED A1C: CPT | Performed by: INTERNAL MEDICINE

## 2023-10-11 PROCEDURE — 4010F PR ACE/ARB THEARPY RXD/TAKEN: ICD-10-PCS | Mod: CPTII,,, | Performed by: INTERNAL MEDICINE

## 2023-10-11 PROCEDURE — 80053 COMPREHEN METABOLIC PANEL: CPT | Performed by: INTERNAL MEDICINE

## 2023-10-11 PROCEDURE — 99999 PR PBB SHADOW E&M-EST. PATIENT-LVL III: CPT | Mod: PBBFAC,,, | Performed by: INTERNAL MEDICINE

## 2023-10-11 RX ORDER — ASPIRIN 325 MG
50000 TABLET, DELAYED RELEASE (ENTERIC COATED) ORAL WEEKLY
Qty: 12 CAPSULE | Refills: 3 | Status: SHIPPED | OUTPATIENT
Start: 2023-10-11

## 2023-10-11 RX ORDER — CARVEDILOL 3.12 MG/1
3.12 TABLET ORAL 2 TIMES DAILY WITH MEALS
Qty: 180 TABLET | Refills: 1 | Status: SHIPPED | OUTPATIENT
Start: 2023-10-11 | End: 2024-03-30

## 2023-10-11 RX ORDER — METFORMIN HYDROCHLORIDE 500 MG/1
500 TABLET, EXTENDED RELEASE ORAL 2 TIMES DAILY WITH MEALS
Qty: 180 TABLET | Refills: 2 | Status: SHIPPED | OUTPATIENT
Start: 2023-10-11 | End: 2024-10-10

## 2023-10-11 NOTE — PROGRESS NOTES
Subjective:       Patient ID: Radha Valle is a 58 y.o. female.    Chief Complaint: Diabetes, Follow-up, and Cough    Here for annual exam    Cough for 24 hours.    Could not tolerate more than 500mg of metformin. Change to XR. Update A1c         Within 7-10 days of starting lipitor 40mg she has been having cramps of her thighs and calves at night, 3-4 times a week.    Palpitations are frequent.         ### HTN ###  Irbesartan 300mg     ### DM ###  Metformin 500mg QD          HGBA1C                   7.5 (H)             07/07/2023 01:54 PM        HGBA1C                   7.4 (H)             12/30/2022 12:48 PM        HGBA1C                   7.4 (H)             12/30/2022 12:48 PM        HGBA1C                   6.0 (H)             08/14/2019 02:03 PM        HGBA1C                   6.3 (H)             04/01/2019 10:23 AM        HGBA1C                   6.1 (H)             08/28/2018 10:23 AM        HGBA1C                   5.9 (H)             08/01/2017 09:50 AM        HGBA1C                   6.4 (H)             11/17/2016 08:40 AM        ### obesity ###  12/30/22 : 33.45 kg/m²  10/26/21 : 32.28 kg/m²  10/20/21 : 34.05 kg/m²  09/30/21 : 32.65 kg/m²  09/15/21 : 33.98 kg/m²  04/01/21 : 34.23 kg/m²  03/08/21 : 33.06 kg/m²        ### Tobacco Use Emphysema (CT)###  Started age 15; 1pk/day;   Quit 03/2023  Shared decision making discussed. Pt amenable to annual lung CA screening with low dose CT.  CT Chest due:    Pt denies SOB at rest or LARES, PND, chest tightness, wheezing, chronic cough, hemoptysis, night sweats, or unexpected weight loss.     ### Constipation ###  Linactolide         Review of Systems   Constitutional:  Negative for chills, fatigue, fever and unexpected weight change.   HENT:  Negative for ear pain, hearing loss, postnasal drip, tinnitus, trouble swallowing and voice change.    Respiratory:  Positive for cough. Negative for chest tightness, shortness of breath and wheezing.   "  Cardiovascular:  Negative for chest pain, palpitations and leg swelling.   Gastrointestinal:  Negative for abdominal pain, blood in stool, diarrhea, nausea and vomiting.   Endocrine: Negative for polydipsia, polyphagia and polyuria.   Genitourinary:  Negative for difficulty urinating, dysuria, hematuria and vaginal bleeding.   Skin:  Negative for rash.   Allergic/Immunologic: Negative for food allergies.   Neurological:  Negative for dizziness, numbness and headaches.   Hematological:  Does not bruise/bleed easily.   Psychiatric/Behavioral:  The patient is not nervous/anxious.        Objective:      Vitals:    10/11/23 1304 10/11/23 1314   BP: (!) 140/82 130/78   BP Location: Right arm    Patient Position: Sitting    Temp: 99.2 °F (37.3 °C)    SpO2: 98%    Weight: 98.6 kg (217 lb 6 oz)    Height: 5' 6" (1.676 m)       Physical Exam  Vitals and nursing note reviewed.   Constitutional:       General: She is not in acute distress.     Appearance: Normal appearance. She is well-developed.   HENT:      Head: Normocephalic and atraumatic.      Mouth/Throat:      Pharynx: No oropharyngeal exudate.   Eyes:      General: No scleral icterus.     Conjunctiva/sclera: Conjunctivae normal.      Pupils: Pupils are equal, round, and reactive to light.   Neck:      Thyroid: No thyromegaly.   Cardiovascular:      Rate and Rhythm: Normal rate and regular rhythm.      Pulses:           Dorsalis pedis pulses are 2+ on the right side and 2+ on the left side.      Heart sounds: Normal heart sounds. No murmur heard.  Pulmonary:      Effort: Pulmonary effort is normal.      Breath sounds: Normal breath sounds. No wheezing or rales.   Abdominal:      General: There is no distension.   Musculoskeletal:         General: No tenderness.      Right foot: Normal range of motion.      Left foot: Normal range of motion.   Feet:      Right foot:      Protective Sensation: 8 sites tested.  8 sites sensed.      Left foot:      Protective Sensation: 8 " sites tested.  8 sites sensed.   Lymphadenopathy:      Cervical: No cervical adenopathy.   Skin:     General: Skin is warm and dry.   Neurological:      Mental Status: She is alert and oriented to person, place, and time.   Psychiatric:         Behavior: Behavior normal.         Assessment:       1. Mixed hyperlipidemia    2. Essential hypertension    3. Former smoker    4. Controlled type 2 diabetes mellitus without complication, without long-term current use of insulin    5. Vitamin D deficiency    6. Elevated alkaline phosphatase level        Plan:       Radha was seen today for diabetes, follow-up and cough.    Diagnoses and all orders for this visit:    Mixed hyperlipidemia    Essential hypertension  -     Comprehensive Metabolic Panel; Future  -     Lipid Panel; Future  -     TSH; Future  -     CBC Auto Differential; Future  -     Hemoglobin A1C; Future    Former smoker   Patient congratulated and continued cessation encouraged. Will continue to monitor and assist.    Controlled type 2 diabetes mellitus without complication, without long-term current use of insulin  -     Diabetic Eye Screening Photo; Future  -     Microalbumin/Creatinine Ratio, Urine; Future  -     Comprehensive Metabolic Panel; Future  -     Lipid Panel; Future  -     TSH; Future  -     CBC Auto Differential; Future  -     Hemoglobin A1C; Future   -     CHANGE to long acting to see if better tolerated. metFORMIN (GLUCOPHAGE-XR) 500 MG ER 24hr tablet; Take 1 tablet (500 mg total) by mouth 2 (two) times daily with meals.  Vitamin D deficiency  -     Vitamin D; Future        cholecalciferol, vitamin D3, 1,250 mcg (50,000 unit) capsule; Take 1 capsule (50,000 Units total) by mouth once a week.    Elevated alkaline phosphatase level  -     ALKALINE PHOSPHATASE, ISOENZYMES; Future    Palpitations  START -     carvediloL (COREG) 3.125 MG tablet; Take 1 tablet (3.125 mg total) by mouth 2 (two) times daily with meals.      RTC in 6 months or  sooner jana Grady MD  [facility]        Side effects of medication(s) were discussed in detail and patient voiced understanding.  Patient will call back for any issues or complications.

## 2023-10-15 ENCOUNTER — HOSPITAL ENCOUNTER (EMERGENCY)
Facility: OTHER | Age: 58
Discharge: HOME | End: 2023-10-16
Attending: EMERGENCY MEDICINE
Payer: MEDICAID

## 2023-10-15 DIAGNOSIS — K02.9 DENTAL CARIES: ICD-10-CM

## 2023-10-15 DIAGNOSIS — M79.18 MUSCULOSKELETAL PAIN: ICD-10-CM

## 2023-10-15 DIAGNOSIS — R51.9 ACUTE NONINTRACTABLE HEADACHE, UNSPECIFIED HEADACHE TYPE: ICD-10-CM

## 2023-10-15 DIAGNOSIS — M54.9 UPPER BACK PAIN ON LEFT SIDE: ICD-10-CM

## 2023-10-15 DIAGNOSIS — R68.84 JAW PAIN: ICD-10-CM

## 2023-10-15 DIAGNOSIS — R03.0 ELEVATED BLOOD PRESSURE READING: Primary | ICD-10-CM

## 2023-10-15 DIAGNOSIS — M25.512 ACUTE PAIN OF LEFT SHOULDER: ICD-10-CM

## 2023-10-15 DIAGNOSIS — M54.2 NECK PAIN: ICD-10-CM

## 2023-10-15 LAB
ALBUMIN SERPL BCP-MCNC: 3.8 G/DL (ref 3.5–5.2)
ALP SERPL-CCNC: 185 U/L (ref 55–135)
ALT SERPL W/O P-5'-P-CCNC: 47 U/L (ref 10–44)
ANION GAP SERPL CALC-SCNC: 12 MMOL/L (ref 8–16)
AST SERPL-CCNC: 31 U/L (ref 10–40)
BASOPHILS # BLD AUTO: 0.03 K/UL (ref 0–0.2)
BASOPHILS NFR BLD: 0.3 % (ref 0–1.9)
BILIRUB SERPL-MCNC: 0.3 MG/DL (ref 0.1–1)
BILIRUB UR QL STRIP: NEGATIVE
BNP SERPL-MCNC: <10 PG/ML (ref 0–99)
BUN SERPL-MCNC: 17 MG/DL (ref 6–20)
CALCIUM SERPL-MCNC: 10 MG/DL (ref 8.7–10.5)
CHLORIDE SERPL-SCNC: 102 MMOL/L (ref 95–110)
CLARITY UR: CLEAR
CO2 SERPL-SCNC: 25 MMOL/L (ref 23–29)
COLOR UR: YELLOW
CREAT SERPL-MCNC: 1 MG/DL (ref 0.5–1.4)
CTP QC/QA: YES
CTP QC/QA: YES
DIFFERENTIAL METHOD: ABNORMAL
EOSINOPHIL # BLD AUTO: 0.3 K/UL (ref 0–0.5)
EOSINOPHIL NFR BLD: 2.9 % (ref 0–8)
ERYTHROCYTE [DISTWIDTH] IN BLOOD BY AUTOMATED COUNT: 16.8 % (ref 11.5–14.5)
EST. GFR  (NO RACE VARIABLE): >60 ML/MIN/1.73 M^2
GLUCOSE SERPL-MCNC: 172 MG/DL (ref 70–110)
GLUCOSE UR QL STRIP: NEGATIVE
HCT VFR BLD AUTO: 35.8 % (ref 37–48.5)
HGB BLD-MCNC: 11.3 G/DL (ref 12–16)
HGB UR QL STRIP: NEGATIVE
HIV 1+2 AB+HIV1 P24 AG SERPL QL IA: NEGATIVE
IMM GRANULOCYTES # BLD AUTO: 0.03 K/UL (ref 0–0.04)
IMM GRANULOCYTES NFR BLD AUTO: 0.3 % (ref 0–0.5)
KETONES UR QL STRIP: NEGATIVE
LEUKOCYTE ESTERASE UR QL STRIP: NEGATIVE
LYMPHOCYTES # BLD AUTO: 3.1 K/UL (ref 1–4.8)
LYMPHOCYTES NFR BLD: 33.1 % (ref 18–48)
MCH RBC QN AUTO: 24.4 PG (ref 27–31)
MCHC RBC AUTO-ENTMCNC: 31.6 G/DL (ref 32–36)
MCV RBC AUTO: 77 FL (ref 82–98)
MONOCYTES # BLD AUTO: 0.7 K/UL (ref 0.3–1)
MONOCYTES NFR BLD: 7.5 % (ref 4–15)
NEUTROPHILS # BLD AUTO: 5.2 K/UL (ref 1.8–7.7)
NEUTROPHILS NFR BLD: 55.9 % (ref 38–73)
NITRITE UR QL STRIP: NEGATIVE
NRBC BLD-RTO: 0 /100 WBC
PH UR STRIP: 6 [PH] (ref 5–8)
PLATELET # BLD AUTO: 263 K/UL (ref 150–450)
PMV BLD AUTO: 10.4 FL (ref 9.2–12.9)
POC MOLECULAR INFLUENZA A AGN: NEGATIVE
POC MOLECULAR INFLUENZA B AGN: NEGATIVE
POTASSIUM SERPL-SCNC: 4 MMOL/L (ref 3.5–5.1)
PROT SERPL-MCNC: 7.9 G/DL (ref 6–8.4)
PROT UR QL STRIP: NEGATIVE
RBC # BLD AUTO: 4.63 M/UL (ref 4–5.4)
SARS-COV-2 RDRP RESP QL NAA+PROBE: NEGATIVE
SODIUM SERPL-SCNC: 139 MMOL/L (ref 136–145)
SP GR UR STRIP: <=1.005 (ref 1–1.03)
TROPONIN I SERPL DL<=0.01 NG/ML-MCNC: 0.01 NG/ML (ref 0–0.03)
TSH SERPL DL<=0.005 MIU/L-ACNC: 1.8 UIU/ML (ref 0.4–4)
URN SPEC COLLECT METH UR: ABNORMAL
UROBILINOGEN UR STRIP-ACNC: NEGATIVE EU/DL
WBC # BLD AUTO: 9.3 K/UL (ref 3.9–12.7)

## 2023-10-15 PROCEDURE — 87389 HIV-1 AG W/HIV-1&-2 AB AG IA: CPT | Performed by: EMERGENCY MEDICINE

## 2023-10-15 PROCEDURE — 81003 URINALYSIS AUTO W/O SCOPE: CPT | Performed by: EMERGENCY MEDICINE

## 2023-10-15 PROCEDURE — 85025 COMPLETE CBC W/AUTO DIFF WBC: CPT | Performed by: EMERGENCY MEDICINE

## 2023-10-15 PROCEDURE — 93010 EKG 12-LEAD: ICD-10-PCS | Mod: ,,, | Performed by: INTERNAL MEDICINE

## 2023-10-15 PROCEDURE — 84484 ASSAY OF TROPONIN QUANT: CPT | Performed by: EMERGENCY MEDICINE

## 2023-10-15 PROCEDURE — 93010 ELECTROCARDIOGRAM REPORT: CPT | Mod: ,,, | Performed by: INTERNAL MEDICINE

## 2023-10-15 PROCEDURE — 84443 ASSAY THYROID STIM HORMONE: CPT | Performed by: EMERGENCY MEDICINE

## 2023-10-15 PROCEDURE — 99285 EMERGENCY DEPT VISIT HI MDM: CPT | Mod: 25

## 2023-10-15 PROCEDURE — 86803 HEPATITIS C AB TEST: CPT | Performed by: EMERGENCY MEDICINE

## 2023-10-15 PROCEDURE — 87635 SARS-COV-2 COVID-19 AMP PRB: CPT | Performed by: EMERGENCY MEDICINE

## 2023-10-15 PROCEDURE — 80053 COMPREHEN METABOLIC PANEL: CPT | Performed by: EMERGENCY MEDICINE

## 2023-10-15 PROCEDURE — 83880 ASSAY OF NATRIURETIC PEPTIDE: CPT | Performed by: EMERGENCY MEDICINE

## 2023-10-15 PROCEDURE — 93005 ELECTROCARDIOGRAM TRACING: CPT

## 2023-10-15 NOTE — Clinical Note
"Radha"Nasim Valle was seen and treated in our emergency department on 10/15/2023.  She may return to work on 10/17/2023.       If you have any questions or concerns, please don't hesitate to call.      Tona Arrington MD"

## 2023-10-16 VITALS
RESPIRATION RATE: 17 BRPM | DIASTOLIC BLOOD PRESSURE: 80 MMHG | SYSTOLIC BLOOD PRESSURE: 145 MMHG | BODY MASS INDEX: 34.55 KG/M2 | TEMPERATURE: 98 F | HEART RATE: 64 BPM | HEIGHT: 66 IN | WEIGHT: 215 LBS | OXYGEN SATURATION: 97 %

## 2023-10-16 VITALS
SYSTOLIC BLOOD PRESSURE: 154 MMHG | WEIGHT: 215 LBS | OXYGEN SATURATION: 99 % | HEART RATE: 68 BPM | RESPIRATION RATE: 17 BRPM | BODY MASS INDEX: 34.7 KG/M2 | DIASTOLIC BLOOD PRESSURE: 81 MMHG | TEMPERATURE: 99 F

## 2023-10-16 DIAGNOSIS — I10 HYPERTENSION, UNSPECIFIED TYPE: ICD-10-CM

## 2023-10-16 DIAGNOSIS — R06.02 SOB (SHORTNESS OF BREATH): ICD-10-CM

## 2023-10-16 DIAGNOSIS — R00.2 PALPITATIONS: Primary | ICD-10-CM

## 2023-10-16 LAB
ALBUMIN SERPL BCP-MCNC: 4 G/DL (ref 3.5–5.2)
ALP SERPL-CCNC: 186 U/L (ref 55–135)
ALT SERPL W/O P-5'-P-CCNC: 42 U/L (ref 10–44)
ANION GAP SERPL CALC-SCNC: 13 MMOL/L (ref 8–16)
AST SERPL-CCNC: 26 U/L (ref 10–40)
BASOPHILS # BLD AUTO: 0.02 K/UL (ref 0–0.2)
BASOPHILS NFR BLD: 0.2 % (ref 0–1.9)
BILIRUB SERPL-MCNC: 0.2 MG/DL (ref 0.1–1)
BNP SERPL-MCNC: 12 PG/ML (ref 0–99)
BUN SERPL-MCNC: 14 MG/DL (ref 6–20)
CALCIUM SERPL-MCNC: 10.2 MG/DL (ref 8.7–10.5)
CHLORIDE SERPL-SCNC: 103 MMOL/L (ref 95–110)
CO2 SERPL-SCNC: 22 MMOL/L (ref 23–29)
CREAT SERPL-MCNC: 0.9 MG/DL (ref 0.5–1.4)
DIFFERENTIAL METHOD: ABNORMAL
EOSINOPHIL # BLD AUTO: 0.2 K/UL (ref 0–0.5)
EOSINOPHIL NFR BLD: 2.5 % (ref 0–8)
ERYTHROCYTE [DISTWIDTH] IN BLOOD BY AUTOMATED COUNT: 16.8 % (ref 11.5–14.5)
EST. GFR  (NO RACE VARIABLE): >60 ML/MIN/1.73 M^2
GLUCOSE SERPL-MCNC: 162 MG/DL (ref 70–110)
HCT VFR BLD AUTO: 38.9 % (ref 37–48.5)
HCV AB SERPL QL IA: NEGATIVE
HGB BLD-MCNC: 12.2 G/DL (ref 12–16)
IMM GRANULOCYTES # BLD AUTO: 0.02 K/UL (ref 0–0.04)
IMM GRANULOCYTES NFR BLD AUTO: 0.2 % (ref 0–0.5)
LYMPHOCYTES # BLD AUTO: 2.6 K/UL (ref 1–4.8)
LYMPHOCYTES NFR BLD: 30.4 % (ref 18–48)
MCH RBC QN AUTO: 24.3 PG (ref 27–31)
MCHC RBC AUTO-ENTMCNC: 31.4 G/DL (ref 32–36)
MCV RBC AUTO: 77 FL (ref 82–98)
MONOCYTES # BLD AUTO: 0.6 K/UL (ref 0.3–1)
MONOCYTES NFR BLD: 6.4 % (ref 4–15)
NEUTROPHILS # BLD AUTO: 5.2 K/UL (ref 1.8–7.7)
NEUTROPHILS NFR BLD: 60.3 % (ref 38–73)
NRBC BLD-RTO: 0 /100 WBC
PLATELET # BLD AUTO: 272 K/UL (ref 150–450)
PMV BLD AUTO: 10.7 FL (ref 9.2–12.9)
POTASSIUM SERPL-SCNC: 4.3 MMOL/L (ref 3.5–5.1)
PROT SERPL-MCNC: 8.8 G/DL (ref 6–8.4)
RBC # BLD AUTO: 5.03 M/UL (ref 4–5.4)
SODIUM SERPL-SCNC: 138 MMOL/L (ref 136–145)
TROPONIN I SERPL DL<=0.01 NG/ML-MCNC: <0.006 NG/ML (ref 0–0.03)
WBC # BLD AUTO: 8.69 K/UL (ref 3.9–12.7)

## 2023-10-16 PROCEDURE — 84484 ASSAY OF TROPONIN QUANT: CPT | Performed by: PHYSICIAN ASSISTANT

## 2023-10-16 PROCEDURE — 85025 COMPLETE CBC W/AUTO DIFF WBC: CPT | Performed by: PHYSICIAN ASSISTANT

## 2023-10-16 PROCEDURE — 93010 EKG 12-LEAD: ICD-10-PCS | Mod: ,,, | Performed by: INTERNAL MEDICINE

## 2023-10-16 PROCEDURE — 99284 EMERGENCY DEPT VISIT MOD MDM: CPT

## 2023-10-16 PROCEDURE — 93010 ELECTROCARDIOGRAM REPORT: CPT | Mod: ,,, | Performed by: INTERNAL MEDICINE

## 2023-10-16 PROCEDURE — 83880 ASSAY OF NATRIURETIC PEPTIDE: CPT | Performed by: PHYSICIAN ASSISTANT

## 2023-10-16 PROCEDURE — 93005 ELECTROCARDIOGRAM TRACING: CPT

## 2023-10-16 PROCEDURE — 80053 COMPREHEN METABOLIC PANEL: CPT | Performed by: PHYSICIAN ASSISTANT

## 2023-10-16 RX ORDER — PENICILLIN V POTASSIUM 500 MG/1
500 TABLET, FILM COATED ORAL EVERY 8 HOURS
Qty: 21 TABLET | Refills: 0 | Status: SHIPPED | OUTPATIENT
Start: 2023-10-16 | End: 2023-10-23

## 2023-10-16 RX ORDER — NEBULIZER AND COMPRESSOR
1 EACH MISCELLANEOUS
Qty: 1 EACH | Refills: 0 | Status: SHIPPED | OUTPATIENT
Start: 2023-10-16

## 2023-10-16 NOTE — DISCHARGE INSTRUCTIONS
For neck and shoulder pain, use over the counter acetaminophen and ibuprofen:    Over the counter tylenol (acetaminophen) comes in tablets of 325mg and 500mg. You may take 3 of the regular strength (325mg) for a total of 975mg, or 2 of the extra strength (500mg) for a total of 1000mg, every 6 hours. Do not exceed a total of 4000mg a day.    Over the counter motrin (ibuprofen) comes in tablets of 200mg. You may take 3 of these tablets (a total of 600mg) every 6 hours or 4 of these tablets (a total of 800mg) every 8 hours. Do not exceed a total of 2400mg a day.

## 2023-10-16 NOTE — ED PROVIDER NOTES
Encounter Date: 10/15/2023       History     Chief Complaint   Patient presents with    Hypertension     C/o headache, chest pain & sob. Pt reports taking her BP meds around 7pm tonight. Pt states that she started a beta blocker today.      58-year-old female with hypertension, dyslipidemia, and diabetes type 2, presents via significant other to [facility] ER with left-sided shoulder pain associated with jaw pain, headache, and elevated blood pressure.    Patient reports intermittent aching 10-20 minute episodes of left shoulder pain for the last 2 weeks.  Tonight around 6-7pm she was at work and pain in her shoulder radiated to her left jaw and also caused her to have a headache.  No ashley chest pain or shortness of breath or diaphoresis or nausea/vomiting.  Patient notes that she suspects she has a bad tooth to her left lower jaw which may be related.  Patient took BC powder around 6pm.  She checked BP at work ([employer]) and it progressively became more elevated, from 175/100 as high as 211/103.  Patient took her regular meds around 7pm.      Patient has had intermittent left upper back and neck pain for the last month or so.  She denies trauma.  No chest pain.  No respiratory complaints.  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    Patient saw PMD Dr. Nakul Grady last week (Wednesday 10/11/23).  He rx'ed Carvedilol 3.125mg BID which patient started Taiwo 10/15/23 in the morning.  Patient was already on Irbesartan 300mg PO qHS.  She is also on Metformin ER 500mg PO BID, Vitamin D3 1250mcg PO qWeek, and Linzess 72mcg PO qday PRN.    Patient quit smoking in March 2023.        Review of patient's allergies indicates:  No Known Allergies  Past Medical History:   Diagnosis Date    Bulging lumbar disc     Bulging of cervical intervertebral disc     Diabetes mellitus, type 2     Hyperlipidemia     Hypertension     Obese     Prediabetes     Vitamin D deficiency      Past Surgical History:    Procedure Laterality Date    COLONOSCOPY N/A 2021    Procedure: COLONOSCOPY;  Surgeon: Abhishek Sanderson MD;  Location: [facility];  Service: Endoscopy;  Laterality: N/A;    TUBAL LIGATION      WISDOM TOOTH EXTRACTION       Family History   Problem Relation Age of Onset    Diabetes Mother     Hyperlipidemia Mother     Hypertension Mother     COPD Father     Diabetes Sister     Alcohol abuse Brother     Cancer Brother         liver    Diabetes Sister     Diabetes Sister     Hypertension Brother     Hypertension Brother     Stroke Brother     Breast cancer Cousin         2nd cousin    Colon cancer Neg Hx     Ovarian cancer Neg Hx      Social History     Tobacco Use    Smoking status: Former     Current packs/day: 0.00     Average packs/day: 0.5 packs/day for 30.0 years (15.0 ttl pk-yrs)     Types: Cigarettes     Start date: 1993     Quit date: 2023     Years since quittin.6    Smokeless tobacco: Former   Substance Use Topics    Alcohol use: Yes     Comment: social    Drug use: No     Review of Systems   Constitutional:  Negative for diaphoresis and fever.   HENT:  Positive for dental problem.         +jaw pain   Eyes:  Negative for photophobia and visual disturbance.   Respiratory:  Negative for cough and shortness of breath.    Cardiovascular:  Negative for chest pain, palpitations and leg swelling.   Gastrointestinal:  Negative for abdominal pain, nausea and vomiting.   Genitourinary:  Negative for dysuria.   Musculoskeletal:  Positive for arthralgias (left) and neck pain (left upper).   Skin:  Negative for rash.   Neurological:  Positive for headaches. Negative for syncope and speech difficulty.       Physical Exam     Initial Vitals [10/15/23 2211]   BP Pulse Resp Temp SpO2   (!) 220/100 95 19 98.5 °F (36.9 °C) 98 %      MAP       --         Physical Exam    Nursing note and vitals reviewed.  Constitutional: She appears well-developed and well-nourished. She is not diaphoretic.   Awake, alert,  nontoxic.   HENT:   Head: Normocephalic and atraumatic.   Mouth/Throat: Oropharynx is clear and moist.   Poor dentition. L lower 1st premolar has obvious carious disease and is TTP. No intraoral pus. No trismus.   Eyes: Conjunctivae and EOM are normal. Pupils are equal, round, and reactive to light.   Neck: Neck supple.   Left lateral inferior neck TTP   Normal range of motion.  Cardiovascular:  Normal rate, regular rhythm, normal heart sounds and intact distal pulses.           No murmur heard.  Pulmonary/Chest: Breath sounds normal. No respiratory distress. She has no wheezes. She has no rhonchi. She has no rales.   Abdominal: Abdomen is soft. There is no abdominal tenderness.   Musculoskeletal:         General: Tenderness (\) present. No edema. Normal range of motion.      Cervical back: Normal range of motion and neck supple.      Comments: Left shoulder with mild superior TTP. No restricted ROM. No erythema.     Neurological: She is alert and oriented to person, place, and time. She has normal strength.   Moving all extremities.   Skin: Skin is warm and dry. No erythema. No pallor.   Psychiatric: She has a normal mood and affect.         ED Course   Procedures  Labs Reviewed   COMPREHENSIVE METABOLIC PANEL - Abnormal; Notable for the following components:       Result Value    Glucose 172 (*)     Alkaline Phosphatase 185 (*)     ALT 47 (*)     All other components within normal limits   CBC W/ AUTO DIFFERENTIAL - Abnormal; Notable for the following components:    Hemoglobin 11.3 (*)     Hematocrit 35.8 (*)     MCV 77 (*)     MCH 24.4 (*)     MCHC 31.6 (*)     RDW 16.8 (*)     All other components within normal limits   URINALYSIS, REFLEX TO URINE CULTURE - Abnormal; Notable for the following components:    Specific Gravity, UA <=1.005 (*)     All other components within normal limits    Narrative:     Specimen Source->Urine   B-TYPE NATRIURETIC PEPTIDE   TROPONIN I   TSH   HIV 1 / 2 ANTIBODY    Narrative:      Release to patient->Immediate   HEPATITIS C ANTIBODY    Narrative:     Release to patient->Immediate   SARS-COV-2 RDRP GENE   POCT INFLUENZA A/B MOLECULAR     EKG Readings: (Independently Interpreted)   22:21: NSR, HR 79. Normal axis. No ectopy. No STEMI.      ECG Results              EKG 12-lead (Final result)  Result time 10/16/23 08:08:28      Final result by Interface, Lab In [facility] (10/16/23 08:08:28)                   Narrative:    Test Reason : R03.0,    Vent. Rate : 079 BPM     Atrial Rate : 079 BPM     P-R Int : 166 ms          QRS Dur : 078 ms      QT Int : 378 ms       P-R-T Axes : 074 066 032 degrees     QTc Int : 433 ms    Normal sinus rhythm with sinus arrhythmia  Normal ECG    Confirmed by Regina Scott MD ([id number]) on 10/16/2023 8:08:21 AM    Referred By: [email or web address]   SELF           Confirmed By:Regina Scott MD                                  Imaging Results              X-Ray Chest AP Portable (Final result)  Result time 10/15/23 23:14:08      Final result by Sadaf Beasley MD (10/15/23 23:14:08)                   Impression:      No acute intrathoracic abnormality detected.      Electronically signed by: Sadaf Beasley  Date:    10/15/2023  Time:    23:14               Narrative:    EXAMINATION:  AP PORTABLE CHEST    CLINICAL HISTORY:  shortness of breath;    TECHNIQUE:  AP portable chest radiograph was submitted.    COMPARISON:  02/28/2021    FINDINGS:  AP portable chest radiograph demonstrates a cardiac silhouette within normal limits.  There is no focal consolidation, pneumothorax, or pleural effusion.                                    X-Rays:   Independently Interpreted Readings:   Other Readings:  CXR NAD    Medications - No data to display  Medical Decision Making  59 yo female with left-sided shoulder pain associated with jaw pain, headache, and elevated blood pressure.    Differential includes ACS, CHF, PE, arrhythmia, PTX, aortic dissection, PNA, musculoskeletal CP, GERD, anxiety,  other.    EKG no STEMI.    CXR NAD.    Labs reassuring.  Negative troponin drawn at 22:34 rules out ACS if chest pain tonight started at 18-19:00.      BP trended down spontaneously in ER.  I suspect elevated BP was due to anxiety and pain and was not the cause of symptoms.  I suspect left shoulder pain is musculoskeletal.  Patient does have poor dentition especially in the area of jaw pain.  I have started her on PCN for caries and recommended close dental f/u.  I have recommended continued compliance with BP meds as rx'ed.  I have recommended keeping a BP diary and have rx'ed a BP cuff for home use.      I have strongly recommended PMD f/u.  Return precautions discussed.    Amount and/or Complexity of Data Reviewed  Labs: ordered.  Radiology: ordered.    Risk  OTC drugs.  Prescription drug management.                               Clinical Impression:   Final diagnoses:  [R03.0] Elevated blood pressure reading (Primary)  [R68.84] Jaw pain  [K02.9] Dental caries  [M25.512] Acute pain of left shoulder  [M54.2] Neck pain  [M54.9] Upper back pain on left side  [R51.9] Acute nonintractable headache, unspecified headache type  [M79.18] Musculoskeletal pain        ED Disposition Condition    Discharge Stable          ED Prescriptions       Medication Sig Dispense Start Date End Date Auth. Provider    BLOOD PRESSURE CUFF Misc 1 Device by Misc.(Non-Drug; Combo Route) route as needed (to check blood pressure). 1 each 10/16/2023 -- Tona Arrington MD    penicillin v potassium (VEETID) 500 MG tablet Take 1 tablet (500 mg total) by mouth every 8 (eight) hours. for 7 days 21 tablet 10/16/2023 10/23/2023 Tona Arrington MD          Follow-up Information       Follow up With Specialties Details Why Contact Info    Nakul Jin MD Internal Medicine   [address] [address]  [phone]               Tona Arrington MD  10/16/23 3750

## 2023-10-16 NOTE — ED TRIAGE NOTES
Radha Valle, a 58 y.o. female presents to the ED with c/o HTN. Pt experienced SOB, sharp L shoulder pain that radiates to L neck and jaw while at work. Pt checked her BP and noted hypertensive readings. Took her newly prescribed beta blocker at 7pm. Reports symptoms resolved at this time, but were alarming enough to prompt her to come to ER. NADN, respirations even and unlabored.       Chief Complaint   Patient presents with    Hypertension     C/o headache, chest pain & sob. Pt reports taking her BP meds around 7pm tonight. Pt states that she started a beta blocker today.      Review of patient's allergies indicates:  No Known Allergies  Past Medical History:   Diagnosis Date    Bulging lumbar disc     Bulging of cervical intervertebral disc     Diabetes mellitus, type 2     Hyperlipidemia     Hypertension     Obese     Prediabetes     Vitamin D deficiency

## 2023-10-17 NOTE — ED PROVIDER NOTES
SCRIBE #1 NOTE: I, Josselyn Mahmood, am scribing for, and in the presence of,  Jarad Black DO. I have scribed the following portions of the note - Other sections scribed: HPI,ROS, and PE.         Source of History:  Patient, Spouse    Chief complaint:  Hypertension (HTN for the last few days, states she started some new pills for DM and new pill for palpitations, was here last night for same, pt wants to add she is having palpitations right now/)      HPI:  Radha Valle is a 58 y.o. female presenting with concerns for hypertension, onset approx. one hour ago. Associated symptoms includes palpations. She denies chest pain or dyspnea. Patient reports she was sitting down watching TV when she began to feel her heart racing. She used an at home device to check BP reading and noticed her BP was significantly higher than usual. Pt notes she has been stressed recently and has a history of anxiety. She was previously seen here at the ED on 10/15/23 for similar symptoms. She states she was at work when she suddenly starting experiencing neck spasms and right shoulder pain. When she went and checked her BP reading it was high and she was concerned resulting in her coming to the ED. Of note, pt recently started new medications prescribed by her PCP for DM and palpations. This is the extent to the patients complaints today here in the emergency department.    ROS:   See HPI.    Review of patient's allergies indicates:  No Known Allergies    PMH:  As per HPI and below:  Past Medical History:   Diagnosis Date    Bulging lumbar disc     Bulging of cervical intervertebral disc     Diabetes mellitus, type 2     Hyperlipidemia     Hypertension     Obese     Prediabetes     Vitamin D deficiency      Past Surgical History:   Procedure Laterality Date    COLONOSCOPY N/A 09/30/2021    Procedure: COLONOSCOPY;  Surgeon: Abhishek Sanderson MD;  Location: [facility];  Service: Endoscopy;  Laterality: N/A;    TUBAL LIGATION    "   WISDOM TOOTH EXTRACTION         Social History     Tobacco Use    Smoking status: Former     Current packs/day: 0.00     Average packs/day: 0.5 packs/day for 30.0 years (15.0 ttl pk-yrs)     Types: Cigarettes     Start date: 1993     Quit date: 2023     Years since quittin.6    Smokeless tobacco: Former   Substance Use Topics    Alcohol use: Yes     Comment: social    Drug use: No       Physical Exam:    BP (!) 145/80   Pulse 64   Temp 98.1 °F (36.7 °C)   Resp 17   Ht 5' 6" (1.676 m)   Wt 97.5 kg (215 lb)   SpO2 97%   BMI 34.70 kg/m²   Nursing note and vital signs reviewed.  Constitutional: No acute distress.  Nontoxic  Cardiovascular: Regular rate and rhythm.  No murmurs. No gallops. No rubs  Respiratory: Clear to auscultation bilaterally.  Good air movement.  No wheezes.  No rhonchi. No rales. No accessory muscle use..  Abdomen: Soft.  Not distended.  Nontender.  No guarding.  No rebound. Non-peritoneal.  Musculoskeletal: Good range of motion all joints.  No deformities.  Neck supple.  No meningismus.  Extremities: No pitting edema  Neuro: alert. At baseline.    Summary of Previous Medical Records:  Patient seen yesterday in the emergency department for high blood pressure.  Labs were unremarkable and was discharged home with reassurance patient recently seen by her primary care doctor for high blood pressure last week.  Started on carvedilol twice daily which she started yesterday morning.  She was already on irbesartan 300 mg p.o. q.h.s.    MDM/ Differential Dx:    The patient's blood pressure is elevated here in the emergency department.  The patient has a history of hypertension.  Based on the patient's presentation today I do not see any signs of endorgan damage representing hypertensive emergency.  I do not think the patient's presentation necessitates further intervention in the Emergency Department to acutely decrease their blood pressure.         ED Course as of 10/16/23 2338   Mon Oct " 16, 2023 2256 BNP: 12 [SM]   2256 Troponin I: <0.006 [SM]   2256 WBC: 8.69 [SM]   2256 Hemoglobin: 12.2 [SM]   2256 Platelet Count: 272 [SM]   2258 EKG 12-lead  EKG independently interpreted by myself shows normal sinus rhythm at a rate of 64, normal intervals, narrow QRS, no acute ST T wave abnormalities.  Overall impression normal EKG compared to an EKG on October 15, 2023 shows no change [SM]   2305 BP(!): 145/80 [SM]   2315 No further workup is indicated in the emergency department today.  I updated pt regarding results and I counseled pt regarding supportive care measures.  Diagnosis and treatment plan explained to patient. I have answered all questions and the patient is satisfied with the plan of care. Patient discharged home in stable condition.   [SM]   2315 Creatinine: 0.9 [SM]      ED Course User Index  [] Jarad Black DO                Scribe Attestation:   Scribe #1: I performed the above scribed service and the documentation accurately describes the services I performed. I attest to the accuracy of the note.    Physician Attestation for Scribe: I, Dr Jarad Black, reviewed documentation as scribed in my presence, which is both accurate and complete.  Diagnostic Impression:    1. Palpitations    2. SOB (shortness of breath)    3. Hypertension, unspecified type         ED Disposition Condition    Discharge Stable            ED Prescriptions    None       Follow-up Information       Follow up With Specialties Details Why Contact Info    Nakul Jin MD Internal Medicine In 2 weeks  [address] [facility]  [id number]  [facility] [address]  [phone]               Jarad Black DO  10/16/23 5006

## 2023-10-17 NOTE — FIRST PROVIDER EVALUATION
Emergency Department TeleTriage Encounter Note      CHIEF COMPLAINT    Chief Complaint   Patient presents with    Hypertension     HTN for the last few days, states she started some new pills for DM and new pill for palpitations, was here last night for same, pt wants to add she is having palpitations right now         VITAL SIGNS   Initial Vitals [10/16/23 2136]   BP Pulse Resp Temp SpO2   (!) 191/81 79 15 98.1 °F (36.7 °C) 96 %      MAP       --            ALLERGIES    Review of patient's allergies indicates:  No Known Allergies    PROVIDER TRIAGE NOTE  58-year-old female presents to the ER for evaluation of palpitations and associated elevated blood pressure.  Patient reports compliance with her antihypertensive medications.  She has noticed elevated blood pressure over the past 2 days.  Her palpitations are intermittent for the past year.  She appears well at this time, blood pressure is elevated.  Denies chest pain.      ORDERS  Labs Reviewed - No data to display    ED Orders (720h ago, onward)      Start Ordered     Status Ordering Provider    10/16/23 2140 10/16/23 2139  EKG 12-lead  Once         Completed by DAINA TILLMAN on 10/16/2023 at  9:47 PM ZAN SILVA              Virtual Visit Note: The provider triage portion of this emergency department evaluation and documentation was performed via [employer], a HIPAA-compliant telemedicine application, in concert with a tele-presenter in the room. A face to face patient evaluation with one of my colleagues will occur once the patient is placed in an emergency department room.      DISCLAIMER: This note was prepared with [employer] voice recognition transcription software. Garbled syntax, mangled pronouns, and other bizarre constructions may be attributed to that software system.

## 2023-10-17 NOTE — ED TRIAGE NOTES
Radha Valle, a 58 y.o. female presents to the ED with c/o HTN and palpitations. Pt checked her BP this evening with a reading in the 160s systolic. Later tonight pt had episode of palpitations, prompting her to take her BP at home with a reading of 190s systolic. Upon arrival, pt is asymptomatic. Just started Metformin yesterday, concerned that this med is causing the symptoms.       Chief Complaint   Patient presents with    Hypertension     HTN for the last few days, states she started some new pills for DM and new pill for palpitations, was here last night for same, pt wants to add she is having palpitations right now       Review of patient's allergies indicates:  No Known Allergies  Past Medical History:   Diagnosis Date    Bulging lumbar disc     Bulging of cervical intervertebral disc     Diabetes mellitus, type 2     Hyperlipidemia     Hypertension     Obese     Prediabetes     Vitamin D deficiency        appropriate

## 2023-10-25 ENCOUNTER — OFFICE VISIT (OUTPATIENT)
Dept: OPTOMETRY | Facility: CLINIC | Age: 58
End: 2023-10-25
Payer: MEDICAID

## 2023-10-25 DIAGNOSIS — R73.03 PREDIABETES: ICD-10-CM

## 2023-10-25 DIAGNOSIS — I10 ESSENTIAL HYPERTENSION: ICD-10-CM

## 2023-10-25 DIAGNOSIS — H04.123 DRY EYE SYNDROME, BILATERAL: Primary | ICD-10-CM

## 2023-10-25 DIAGNOSIS — F17.200 TOBACCO USE DISORDER: ICD-10-CM

## 2023-10-25 DIAGNOSIS — E11.9 DIABETES MELLITUS WITHOUT COMPLICATION: ICD-10-CM

## 2023-10-25 PROCEDURE — 3051F HG A1C>EQUAL 7.0%<8.0%: CPT | Mod: CPTII,,, | Performed by: OPTOMETRIST

## 2023-10-25 PROCEDURE — 4010F PR ACE/ARB THEARPY RXD/TAKEN: ICD-10-PCS | Mod: CPTII,,, | Performed by: OPTOMETRIST

## 2023-10-25 PROCEDURE — 99204 PR OFFICE/OUTPT VISIT, NEW, LEVL IV, 45-59 MIN: ICD-10-PCS | Mod: S$PBB,,, | Performed by: OPTOMETRIST

## 2023-10-25 PROCEDURE — 3061F NEG MICROALBUMINURIA REV: CPT | Mod: CPTII,,, | Performed by: OPTOMETRIST

## 2023-10-25 PROCEDURE — 99999 PR PBB SHADOW E&M-EST. PATIENT-LVL II: CPT | Mod: PBBFAC,,, | Performed by: OPTOMETRIST

## 2023-10-25 PROCEDURE — 99204 OFFICE O/P NEW MOD 45 MIN: CPT | Mod: S$PBB,,, | Performed by: OPTOMETRIST

## 2023-10-25 PROCEDURE — 99999 PR PBB SHADOW E&M-EST. PATIENT-LVL II: ICD-10-PCS | Mod: PBBFAC,,, | Performed by: OPTOMETRIST

## 2023-10-25 PROCEDURE — 3061F PR NEG MICROALBUMINURIA RESULT DOCUMENTED/REVIEW: ICD-10-PCS | Mod: CPTII,,, | Performed by: OPTOMETRIST

## 2023-10-25 PROCEDURE — 1159F PR MEDICATION LIST DOCUMENTED IN MEDICAL RECORD: ICD-10-PCS | Mod: CPTII,,, | Performed by: OPTOMETRIST

## 2023-10-25 PROCEDURE — 1159F MED LIST DOCD IN RCRD: CPT | Mod: CPTII,,, | Performed by: OPTOMETRIST

## 2023-10-25 PROCEDURE — 2023F DILAT RTA XM W/O RTNOPTHY: CPT | Mod: CPTII,,, | Performed by: OPTOMETRIST

## 2023-10-25 PROCEDURE — 3051F PR MOST RECENT HEMOGLOBIN A1C LEVEL 7.0 - < 8.0%: ICD-10-PCS | Mod: CPTII,,, | Performed by: OPTOMETRIST

## 2023-10-25 PROCEDURE — 4010F ACE/ARB THERAPY RXD/TAKEN: CPT | Mod: CPTII,,, | Performed by: OPTOMETRIST

## 2023-10-25 PROCEDURE — 3066F NEPHROPATHY DOC TX: CPT | Mod: CPTII,,, | Performed by: OPTOMETRIST

## 2023-10-25 PROCEDURE — 3066F PR DOCUMENTATION OF TREATMENT FOR NEPHROPATHY: ICD-10-PCS | Mod: CPTII,,, | Performed by: OPTOMETRIST

## 2023-10-25 PROCEDURE — 99212 OFFICE O/P EST SF 10 MIN: CPT | Mod: PBBFAC,PO | Performed by: OPTOMETRIST

## 2023-10-25 PROCEDURE — 2023F PR DILATED RETINAL EXAM W/O EVID OF RETINOPATHY: ICD-10-PCS | Mod: CPTII,,, | Performed by: OPTOMETRIST

## 2023-10-25 NOTE — PROGRESS NOTES
HPI    BAYRON: 1/22/2020 - Dr. Hogue    CC: Pt is here today for a routine eye exam. Pt states that vision is   overall stable and occasionally becomes blurry.    (+)Dryness  (-)Burning  (-)Itchiness  (+)Tearing  (-)Flashes  (-)Floaters   (-)Photophobia  (-)Eye Pain      Diabetic: yes  A1C: 7.8 on 10/11/2023    Contact Lens: no    Eye Meds: Systane BID    Last edited by Erin Abdalla MA on 10/25/2023  2:38 PM.            Assessment /Plan     For exam results, see Encounter Report.    Dry eye syndrome, bilateral      Start lifitegrast (XIIDRA) 5 % Dpet; Apply 1 drop to eye every 12 (twelve) hours.  Dispense: 180 each; Refill: 4   Continue with systane PRN    Diabetes mellitus without complication  Prediabetes  No retinopathy, monitor yearly  Essential hypertension  Tobacco use disorder    RTC 1 year, sooner PRN

## 2023-10-27 LAB
ALP BONE CFR SERPL: 19 % (ref 28–66)
ALP INTEST CFR SERPL: 0 % (ref 1–24)
ALP ISOS SERPL-IMP: ABNORMAL
ALP LIVER CFR SERPL: 81 % (ref 25–69)
ALP MACROHEPATIC CFR SERPL: ABNORMAL %
ALP PLAC CFR SERPL: 0 %
ALP SERPL-CCNC: 183 U/L (ref 37–153)

## 2023-11-06 ENCOUNTER — HOSPITAL ENCOUNTER (OUTPATIENT)
Dept: RADIOLOGY | Facility: OTHER | Age: 58
Discharge: HOME | End: 2023-11-06
Attending: INTERNAL MEDICINE
Payer: MEDICAID

## 2023-11-06 DIAGNOSIS — Z12.31 ENCOUNTER FOR SCREENING MAMMOGRAM FOR BREAST CANCER: ICD-10-CM

## 2023-11-06 PROCEDURE — 77067 SCR MAMMO BI INCL CAD: CPT | Mod: TC

## 2023-11-06 PROCEDURE — 77067 MAMMO DIGITAL SCREENING BILAT WITH TOMO: ICD-10-PCS | Mod: 26,,, | Performed by: RADIOLOGY

## 2023-11-06 PROCEDURE — 77067 SCR MAMMO BI INCL CAD: CPT | Mod: 26,,, | Performed by: RADIOLOGY

## 2023-11-06 PROCEDURE — 77063 BREAST TOMOSYNTHESIS BI: CPT | Mod: 26,,, | Performed by: RADIOLOGY

## 2023-11-06 PROCEDURE — 77063 MAMMO DIGITAL SCREENING BILAT WITH TOMO: ICD-10-PCS | Mod: 26,,, | Performed by: RADIOLOGY

## 2023-11-08 ENCOUNTER — OFFICE VISIT (OUTPATIENT)
Dept: INTERNAL MEDICINE | Facility: CLINIC | Age: 58
End: 2023-11-08
Attending: INTERNAL MEDICINE
Payer: MEDICAID

## 2023-11-08 VITALS
DIASTOLIC BLOOD PRESSURE: 72 MMHG | HEART RATE: 68 BPM | SYSTOLIC BLOOD PRESSURE: 122 MMHG | HEIGHT: 66 IN | BODY MASS INDEX: 35.04 KG/M2 | WEIGHT: 218.06 LBS | OXYGEN SATURATION: 98 %

## 2023-11-08 DIAGNOSIS — E11.9 CONTROLLED TYPE 2 DIABETES MELLITUS WITHOUT COMPLICATION, WITHOUT LONG-TERM CURRENT USE OF INSULIN: Primary | ICD-10-CM

## 2023-11-08 DIAGNOSIS — M25.512 ACUTE PAIN OF LEFT SHOULDER: ICD-10-CM

## 2023-11-08 DIAGNOSIS — I10 ESSENTIAL HYPERTENSION: ICD-10-CM

## 2023-11-08 PROCEDURE — 99213 OFFICE O/P EST LOW 20 MIN: CPT | Mod: PBBFAC | Performed by: INTERNAL MEDICINE

## 2023-11-08 PROCEDURE — 99214 PR OFFICE/OUTPT VISIT, EST, LEVL IV, 30-39 MIN: ICD-10-PCS | Mod: S$PBB,,, | Performed by: INTERNAL MEDICINE

## 2023-11-08 PROCEDURE — 3066F PR DOCUMENTATION OF TREATMENT FOR NEPHROPATHY: ICD-10-PCS | Mod: CPTII,,, | Performed by: INTERNAL MEDICINE

## 2023-11-08 PROCEDURE — 1159F PR MEDICATION LIST DOCUMENTED IN MEDICAL RECORD: ICD-10-PCS | Mod: CPTII,,, | Performed by: INTERNAL MEDICINE

## 2023-11-08 PROCEDURE — 99999 PR PBB SHADOW E&M-EST. PATIENT-LVL III: CPT | Mod: PBBFAC,,, | Performed by: INTERNAL MEDICINE

## 2023-11-08 PROCEDURE — 99999 PR PBB SHADOW E&M-EST. PATIENT-LVL III: ICD-10-PCS | Mod: PBBFAC,,, | Performed by: INTERNAL MEDICINE

## 2023-11-08 PROCEDURE — 1159F MED LIST DOCD IN RCRD: CPT | Mod: CPTII,,, | Performed by: INTERNAL MEDICINE

## 2023-11-08 PROCEDURE — 3051F PR MOST RECENT HEMOGLOBIN A1C LEVEL 7.0 - < 8.0%: ICD-10-PCS | Mod: CPTII,,, | Performed by: INTERNAL MEDICINE

## 2023-11-08 PROCEDURE — 3051F HG A1C>EQUAL 7.0%<8.0%: CPT | Mod: CPTII,,, | Performed by: INTERNAL MEDICINE

## 2023-11-08 PROCEDURE — 3074F SYST BP LT 130 MM HG: CPT | Mod: CPTII,,, | Performed by: INTERNAL MEDICINE

## 2023-11-08 PROCEDURE — 3078F PR MOST RECENT DIASTOLIC BLOOD PRESSURE < 80 MM HG: ICD-10-PCS | Mod: CPTII,,, | Performed by: INTERNAL MEDICINE

## 2023-11-08 PROCEDURE — 3078F DIAST BP <80 MM HG: CPT | Mod: CPTII,,, | Performed by: INTERNAL MEDICINE

## 2023-11-08 PROCEDURE — 3061F NEG MICROALBUMINURIA REV: CPT | Mod: CPTII,,, | Performed by: INTERNAL MEDICINE

## 2023-11-08 PROCEDURE — 3074F PR MOST RECENT SYSTOLIC BLOOD PRESSURE < 130 MM HG: ICD-10-PCS | Mod: CPTII,,, | Performed by: INTERNAL MEDICINE

## 2023-11-08 PROCEDURE — 99214 OFFICE O/P EST MOD 30 MIN: CPT | Mod: S$PBB,,, | Performed by: INTERNAL MEDICINE

## 2023-11-08 PROCEDURE — 4010F ACE/ARB THERAPY RXD/TAKEN: CPT | Mod: CPTII,,, | Performed by: INTERNAL MEDICINE

## 2023-11-08 PROCEDURE — 3008F BODY MASS INDEX DOCD: CPT | Mod: CPTII,,, | Performed by: INTERNAL MEDICINE

## 2023-11-08 PROCEDURE — 3061F PR NEG MICROALBUMINURIA RESULT DOCUMENTED/REVIEW: ICD-10-PCS | Mod: CPTII,,, | Performed by: INTERNAL MEDICINE

## 2023-11-08 PROCEDURE — 3008F PR BODY MASS INDEX (BMI) DOCUMENTED: ICD-10-PCS | Mod: CPTII,,, | Performed by: INTERNAL MEDICINE

## 2023-11-08 PROCEDURE — 4010F PR ACE/ARB THEARPY RXD/TAKEN: ICD-10-PCS | Mod: CPTII,,, | Performed by: INTERNAL MEDICINE

## 2023-11-08 PROCEDURE — 3066F NEPHROPATHY DOC TX: CPT | Mod: CPTII,,, | Performed by: INTERNAL MEDICINE

## 2023-11-08 NOTE — PROGRESS NOTES
"Subjective:       Patient ID: Radha Valle is a 58 y.o. female.    Chief Complaint: Diabetes and Follow-up (Discuss meds/metformin causing upset stomach)    Here for ED f/u    Seen in ED twice for elevated BP in setting of shoulder pain. She has pain then checks her BP. The pain along with elevated BP drove her to ED. D/C s/ change in management.    Unable to tolerate XR metformin either due to GI SE of nausea.    Within 7-10 days of starting lipitor 40mg she has been having cramps of her thighs and calves at night, 3-4 times a week.           Review of Systems   Constitutional:  Negative for chills, fatigue, fever and unexpected weight change.   HENT:  Negative for ear pain, hearing loss, postnasal drip, tinnitus, trouble swallowing and voice change.    Respiratory:  Negative for cough, chest tightness, shortness of breath and wheezing.    Cardiovascular:  Negative for chest pain, palpitations and leg swelling.   Gastrointestinal:  Negative for abdominal pain, blood in stool, diarrhea, nausea and vomiting.   Endocrine: Negative for polydipsia, polyphagia and polyuria.   Genitourinary:  Negative for difficulty urinating, dysuria, hematuria and vaginal bleeding.   Skin:  Negative for rash.   Allergic/Immunologic: Negative for food allergies.   Neurological:  Negative for dizziness, numbness and headaches.   Hematological:  Does not bruise/bleed easily.   Psychiatric/Behavioral:  The patient is not nervous/anxious.        Objective:      Vitals:    11/08/23 1035   BP: 122/72   BP Location: Left arm   Patient Position: Sitting   Pulse: 68   SpO2: 98%   Weight: 98.9 kg (218 lb 0.6 oz)   Height: 5' 6" (1.676 m)      Physical Exam  Vitals and nursing note reviewed.   Constitutional:       General: She is not in acute distress.     Appearance: Normal appearance. She is well-developed.   HENT:      Head: Normocephalic and atraumatic.      Mouth/Throat:      Pharynx: No oropharyngeal exudate.   Eyes:      General: " No scleral icterus.     Conjunctiva/sclera: Conjunctivae normal.      Pupils: Pupils are equal, round, and reactive to light.   Neck:      Thyroid: No thyromegaly.   Cardiovascular:      Rate and Rhythm: Normal rate and regular rhythm.      Heart sounds: Normal heart sounds. No murmur heard.  Pulmonary:      Effort: Pulmonary effort is normal.      Breath sounds: Normal breath sounds. No wheezing or rales.   Abdominal:      General: There is no distension.   Musculoskeletal:         General: No tenderness.   Lymphadenopathy:      Cervical: No cervical adenopathy.   Skin:     General: Skin is warm and dry.   Neurological:      Mental Status: She is alert and oriented to person, place, and time.   Psychiatric:         Behavior: Behavior normal.         Assessment:       1. Controlled type 2 diabetes mellitus without complication, without long-term current use of insulin    2. Essential hypertension    3. Acute pain of left shoulder        Plan:       Radha was seen today for diabetes and follow-up.    Diagnoses and all orders for this visit:    Controlled type 2 diabetes mellitus without complication, without long-term current use of insulin  -     SITagliptin phosphate (JANUVIA) 50 MG Tab; Take 1 tablet (50 mg total) by mouth once daily.  Essential hypertension   Controlled and asymptomatic.  Continue current Rx regimen.    Acute pain of left shoulder  No red flags on chronic Hx or acute Hx. No s/s on exam to suggest emergent evaluation needed. Common pathologies of recurrent MSK discussed. Common triggers discussed. Common OTC and Rx modalities discussed. Discussed and/or given HEP.                  Nakul Grady MD  Internal Medicine-[facility]        Side effects of medication(s) were discussed in detail and patient voiced understanding.  Patient will call back for any issues or complications.

## 2024-01-30 ENCOUNTER — OFFICE VISIT (OUTPATIENT)
Dept: INTERNAL MEDICINE | Facility: CLINIC | Age: 59
End: 2024-01-30
Attending: INTERNAL MEDICINE
Payer: MEDICAID

## 2024-01-30 ENCOUNTER — PATIENT MESSAGE (OUTPATIENT)
Dept: INTERNAL MEDICINE | Facility: CLINIC | Age: 59
End: 2024-01-30

## 2024-01-30 VITALS
HEIGHT: 66 IN | OXYGEN SATURATION: 97 % | SYSTOLIC BLOOD PRESSURE: 128 MMHG | DIASTOLIC BLOOD PRESSURE: 84 MMHG | BODY MASS INDEX: 35.29 KG/M2 | HEART RATE: 86 BPM | WEIGHT: 219.56 LBS

## 2024-01-30 DIAGNOSIS — R09.81 CONGESTION OF NASAL SINUS: Primary | ICD-10-CM

## 2024-01-30 DIAGNOSIS — M77.8 ELBOW TENDONITIS: ICD-10-CM

## 2024-01-30 PROCEDURE — 1159F MED LIST DOCD IN RCRD: CPT | Mod: CPTII,,, | Performed by: INTERNAL MEDICINE

## 2024-01-30 PROCEDURE — 99214 OFFICE O/P EST MOD 30 MIN: CPT | Mod: S$PBB,,, | Performed by: INTERNAL MEDICINE

## 2024-01-30 PROCEDURE — 3079F DIAST BP 80-89 MM HG: CPT | Mod: CPTII,,, | Performed by: INTERNAL MEDICINE

## 2024-01-30 PROCEDURE — 99214 OFFICE O/P EST MOD 30 MIN: CPT | Mod: PBBFAC | Performed by: INTERNAL MEDICINE

## 2024-01-30 PROCEDURE — 1160F RVW MEDS BY RX/DR IN RCRD: CPT | Mod: CPTII,,, | Performed by: INTERNAL MEDICINE

## 2024-01-30 PROCEDURE — 3072F LOW RISK FOR RETINOPATHY: CPT | Mod: CPTII,,, | Performed by: INTERNAL MEDICINE

## 2024-01-30 PROCEDURE — 3074F SYST BP LT 130 MM HG: CPT | Mod: CPTII,,, | Performed by: INTERNAL MEDICINE

## 2024-01-30 PROCEDURE — 99999 PR PBB SHADOW E&M-EST. PATIENT-LVL IV: CPT | Mod: PBBFAC,,, | Performed by: INTERNAL MEDICINE

## 2024-01-30 PROCEDURE — 3008F BODY MASS INDEX DOCD: CPT | Mod: CPTII,,, | Performed by: INTERNAL MEDICINE

## 2024-01-30 NOTE — PROGRESS NOTES
"Subjective:       Patient ID: Radha Valle is a 58 y.o. female.    Chief Complaint: Arm Pain (left)    Here for urgent visit    Left elbow pain for past month. No inciting event or trauma. Works as a CNA.  Pain if medial and lateral epicondyles final use of arm against resistance. Patient denies radiation of pain, numbness//tingling of upper ext, weakness of arm or hand.    Stopped Januvia early 1/2024 due to frequent HA. Denies problems with elevated BG. Due for labs in 2 months.     Chronic rhinitis despite daily flonase and antihistamine        Review of Systems   Constitutional:  Negative for chills, fatigue, fever and unexpected weight change.   HENT:  Negative for ear pain, hearing loss, postnasal drip, tinnitus, trouble swallowing and voice change.    Respiratory:  Negative for cough, chest tightness, shortness of breath and wheezing.    Cardiovascular:  Negative for chest pain, palpitations and leg swelling.   Gastrointestinal:  Negative for abdominal pain, blood in stool, diarrhea, nausea and vomiting.   Endocrine: Negative for polydipsia, polyphagia and polyuria.   Genitourinary:  Negative for difficulty urinating, dysuria, hematuria and vaginal bleeding.   Skin:  Negative for rash.   Allergic/Immunologic: Negative for food allergies.   Neurological:  Negative for dizziness, numbness and headaches.   Hematological:  Does not bruise/bleed easily.   Psychiatric/Behavioral:  The patient is not nervous/anxious.        Objective:      Vitals:    01/30/24 0906 01/30/24 1054   BP: (!) 140/90 128/84   BP Location: Right arm    Patient Position: Sitting    BP Method: Large (Manual)    Pulse: 86    SpO2: 97%    Weight: 99.6 kg (219 lb 9.3 oz)    Height: 5' 6" (1.676 m)       Physical Exam  Constitutional:       General: She is not in acute distress.     Appearance: Normal appearance. She is well-developed. She is not diaphoretic.   HENT:      Head: Normocephalic and atraumatic.   Eyes:      General: No " scleral icterus.        Right eye: No discharge.         Left eye: No discharge.      Conjunctiva/sclera: Conjunctivae normal.   Pulmonary:      Effort: Pulmonary effort is normal. No respiratory distress.   Abdominal:      General: There is no distension.   Musculoskeletal:      Left elbow: Tenderness present in medial epicondyle. No radial head, lateral epicondyle or olecranon process tenderness.   Skin:     General: Skin is warm and dry.   Neurological:      Mental Status: She is alert and oriented to person, place, and time.   Psychiatric:         Speech: Speech normal.         Assessment:       1. Congestion of nasal sinus    2. Elbow tendonitis        Plan:       Radha was seen today for arm pain.    Diagnoses and all orders for this visit:    Congestion of nasal sinus  -     Ambulatory referral/consult to ENT; Future    Elbow tendonitis   No red flags on chronic Hx or acute Hx. No s/s on exam to suggest emergent evaluation needed. Common pathologies of recurrent MSK discussed. Common triggers discussed. Common OTC and Rx modalities discussed. Discussed and/or given HEP.            Nakul Grady MD  Internal Medicine-[facility]        Side effects of medication(s) were discussed in detail and patient voiced understanding.  Patient will call back for any issues or complications.

## 2024-04-12 ENCOUNTER — OFFICE VISIT (OUTPATIENT)
Dept: INTERNAL MEDICINE | Facility: CLINIC | Age: 59
End: 2024-04-12
Attending: INTERNAL MEDICINE
Payer: MEDICAID

## 2024-04-12 ENCOUNTER — LAB VISIT (OUTPATIENT)
Dept: LAB | Facility: OTHER | Age: 59
End: 2024-04-12
Attending: INTERNAL MEDICINE
Payer: MEDICAID

## 2024-04-12 VITALS
HEIGHT: 66 IN | HEART RATE: 72 BPM | DIASTOLIC BLOOD PRESSURE: 78 MMHG | BODY MASS INDEX: 34.33 KG/M2 | SYSTOLIC BLOOD PRESSURE: 138 MMHG | WEIGHT: 213.63 LBS | OXYGEN SATURATION: 98 %

## 2024-04-12 DIAGNOSIS — E11.9 CONTROLLED TYPE 2 DIABETES MELLITUS WITHOUT COMPLICATION, WITHOUT LONG-TERM CURRENT USE OF INSULIN: ICD-10-CM

## 2024-04-12 DIAGNOSIS — K59.00 CONSTIPATION, UNSPECIFIED CONSTIPATION TYPE: ICD-10-CM

## 2024-04-12 DIAGNOSIS — D50.9 IRON DEFICIENCY ANEMIA, UNSPECIFIED IRON DEFICIENCY ANEMIA TYPE: ICD-10-CM

## 2024-04-12 DIAGNOSIS — E78.2 MIXED HYPERLIPIDEMIA: ICD-10-CM

## 2024-04-12 DIAGNOSIS — K75.81 NASH (NONALCOHOLIC STEATOHEPATITIS): ICD-10-CM

## 2024-04-12 DIAGNOSIS — R07.2 PRECORDIAL PAIN: ICD-10-CM

## 2024-04-12 DIAGNOSIS — M77.8 ELBOW TENDONITIS: ICD-10-CM

## 2024-04-12 DIAGNOSIS — R09.81 CONGESTION OF NASAL SINUS: ICD-10-CM

## 2024-04-12 DIAGNOSIS — I10 ESSENTIAL HYPERTENSION: Primary | ICD-10-CM

## 2024-04-12 DIAGNOSIS — Z87.891 FORMER SMOKER: ICD-10-CM

## 2024-04-12 LAB
ALBUMIN SERPL BCP-MCNC: 3.7 G/DL (ref 3.5–5.2)
ALP SERPL-CCNC: 154 U/L (ref 55–135)
ALT SERPL W/O P-5'-P-CCNC: 28 U/L (ref 10–44)
ANION GAP SERPL CALC-SCNC: 8 MMOL/L (ref 8–16)
AST SERPL-CCNC: 19 U/L (ref 10–40)
BILIRUB SERPL-MCNC: 0.3 MG/DL (ref 0.1–1)
BUN SERPL-MCNC: 11 MG/DL (ref 6–20)
CALCIUM SERPL-MCNC: 9.6 MG/DL (ref 8.7–10.5)
CHLORIDE SERPL-SCNC: 103 MMOL/L (ref 95–110)
CO2 SERPL-SCNC: 27 MMOL/L (ref 23–29)
CREAT SERPL-MCNC: 0.8 MG/DL (ref 0.5–1.4)
EST. GFR  (NO RACE VARIABLE): >60 ML/MIN/1.73 M^2
ESTIMATED AVG GLUCOSE: 183 MG/DL (ref 68–131)
GLUCOSE SERPL-MCNC: 131 MG/DL (ref 70–110)
HBA1C MFR BLD: 8 % (ref 4–5.6)
POTASSIUM SERPL-SCNC: 4.2 MMOL/L (ref 3.5–5.1)
PROT SERPL-MCNC: 7.8 G/DL (ref 6–8.4)
SODIUM SERPL-SCNC: 138 MMOL/L (ref 136–145)

## 2024-04-12 PROCEDURE — 1160F RVW MEDS BY RX/DR IN RCRD: CPT | Mod: CPTII,,, | Performed by: INTERNAL MEDICINE

## 2024-04-12 PROCEDURE — 3052F HG A1C>EQUAL 8.0%<EQUAL 9.0%: CPT | Mod: CPTII,,, | Performed by: INTERNAL MEDICINE

## 2024-04-12 PROCEDURE — 3072F LOW RISK FOR RETINOPATHY: CPT | Mod: CPTII,,, | Performed by: INTERNAL MEDICINE

## 2024-04-12 PROCEDURE — 4010F ACE/ARB THERAPY RXD/TAKEN: CPT | Mod: CPTII,,, | Performed by: INTERNAL MEDICINE

## 2024-04-12 PROCEDURE — 3008F BODY MASS INDEX DOCD: CPT | Mod: CPTII,,, | Performed by: INTERNAL MEDICINE

## 2024-04-12 PROCEDURE — 99999 PR PBB SHADOW E&M-EST. PATIENT-LVL III: CPT | Mod: PBBFAC,,, | Performed by: INTERNAL MEDICINE

## 2024-04-12 PROCEDURE — 36415 COLL VENOUS BLD VENIPUNCTURE: CPT | Performed by: INTERNAL MEDICINE

## 2024-04-12 PROCEDURE — 99214 OFFICE O/P EST MOD 30 MIN: CPT | Mod: S$PBB,,, | Performed by: INTERNAL MEDICINE

## 2024-04-12 PROCEDURE — 80053 COMPREHEN METABOLIC PANEL: CPT | Performed by: INTERNAL MEDICINE

## 2024-04-12 PROCEDURE — 1159F MED LIST DOCD IN RCRD: CPT | Mod: CPTII,,, | Performed by: INTERNAL MEDICINE

## 2024-04-12 PROCEDURE — 83036 HEMOGLOBIN GLYCOSYLATED A1C: CPT | Performed by: INTERNAL MEDICINE

## 2024-04-12 PROCEDURE — 3075F SYST BP GE 130 - 139MM HG: CPT | Mod: CPTII,,, | Performed by: INTERNAL MEDICINE

## 2024-04-12 PROCEDURE — 3078F DIAST BP <80 MM HG: CPT | Mod: CPTII,,, | Performed by: INTERNAL MEDICINE

## 2024-04-12 PROCEDURE — 99213 OFFICE O/P EST LOW 20 MIN: CPT | Mod: PBBFAC | Performed by: INTERNAL MEDICINE

## 2024-04-12 RX ORDER — ATORVASTATIN CALCIUM 80 MG/1
80 TABLET, FILM COATED ORAL DAILY
Qty: 90 TABLET | Refills: 3 | Status: SHIPPED | OUTPATIENT
Start: 2024-04-12 | End: 2025-04-12

## 2024-04-12 NOTE — PROGRESS NOTES
Subjective:       Patient ID: Radha Valle is a 59 y.o. female.    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       Stopped januvia 01/2024 due to frequent HA.     ROS for intermittent substernal CP that last minutes at a time and occurs with rest and exertion. No associated symptoms. She has plenty of risk factors. Needs stress test.    ### DM ###  Prior Meds: metformin caused GI upset. Januvia caused HA               HGBA1C                   7.8 (H)             10/11/2023 02:19 PM        HGBA1C                   7.5 (H)             07/07/2023 01:54 PM        HGBA1C                   7.4 (H)             12/30/2022 12:48 PM        HGBA1C                   7.4 (H)             12/30/2022 12:48 PM        HGBA1C                   6.0 (H)             08/14/2019 02:03 PM        HGBA1C                   6.3 (H)             04/01/2019 10:23 AM        HGBA1C                   6.1 (H)             08/28/2018 10:23 AM        HGBA1C                   5.9 (H)             08/01/2017 09:50 AM        HGBA1C                   6.4 (H)             11/17/2016 08:40 AM     ### HTN ###  Irbesartan 300 coreg 3.125 BID (has concomitant palpitations)      ### CORDERO ###    #### Former smoker  Pack years: >0.5 for 30 yrs= 20+  Quit:03/2023  Shared decision making discussed. Pt voiced understanding and is amenable to the r/b of annual lung cancer screening with low dose, non contrast CT.      ### chronic rhinitis ###              Review of Systems   Constitutional:  Negative for chills, fatigue, fever and unexpected weight change.   HENT:  Negative for ear pain, hearing loss, postnasal drip, tinnitus, trouble swallowing and voice change.    Respiratory:  Negative for cough, chest tightness, shortness of breath and wheezing.    Cardiovascular:  Negative for chest pain, palpitations and leg swelling.   Gastrointestinal:  Negative for abdominal pain, blood in stool,  "diarrhea, nausea and vomiting.   Endocrine: Negative for polydipsia, polyphagia and polyuria.   Genitourinary:  Negative for difficulty urinating, dysuria, hematuria and vaginal bleeding.   Skin:  Negative for rash.   Allergic/Immunologic: Negative for food allergies.   Neurological:  Negative for dizziness, numbness and headaches.   Hematological:  Does not bruise/bleed easily.   Psychiatric/Behavioral:  The patient is not nervous/anxious.        Objective:      Vitals:    04/12/24 1303 04/12/24 1324   BP: (!) 140/80 138/78   BP Location: Left arm    Patient Position: Sitting    BP Method: Medium (Manual)    Pulse: 72    SpO2: 98%    Weight: 96.9 kg (213 lb 10 oz)    Height: 5' 6" (1.676 m)       Physical Exam  Vitals and nursing note reviewed.   Constitutional:       General: She is not in acute distress.     Appearance: Normal appearance. She is well-developed.   HENT:      Head: Normocephalic and atraumatic.      Mouth/Throat:      Pharynx: No oropharyngeal exudate.   Eyes:      General: No scleral icterus.     Conjunctiva/sclera: Conjunctivae normal.      Pupils: Pupils are equal, round, and reactive to light.   Neck:      Thyroid: No thyromegaly.   Cardiovascular:      Rate and Rhythm: Normal rate and regular rhythm.      Heart sounds: Normal heart sounds. No murmur heard.  Pulmonary:      Effort: Pulmonary effort is normal.      Breath sounds: Normal breath sounds. No wheezing or rales.   Abdominal:      General: There is no distension.   Musculoskeletal:         General: No tenderness.   Lymphadenopathy:      Cervical: No cervical adenopathy.   Skin:     General: Skin is warm and dry.   Neurological:      Mental Status: She is alert and oriented to person, place, and time.   Psychiatric:         Behavior: Behavior normal.         Assessment:       1. Essential hypertension    2. Mixed hyperlipidemia    3. CORDERO (nonalcoholic steatohepatitis)    4. Controlled type 2 diabetes mellitus without complication, " without long-term current use of insulin    5. Iron deficiency anemia, unspecified iron deficiency anemia type    6. Elbow tendonitis    7. Congestion of nasal sinus    8. Constipation, unspecified constipation type    9. Former smoker    10. Precordial pain        Plan:       Radha was seen today for follow-up.    Diagnoses and all orders for this visit:    Essential hypertension   Controlled and asymptomatic.  Continue current Rx regimen.    Mixed hyperlipidemia   Tolerating statin. Continue this.     CORDERO (nonalcoholic steatohepatitis)  -     US Abdomen Limited; Future  -     Comprehensive Metabolic Panel; Future    Controlled type 2 diabetes mellitus without complication, without long-term current use of insulin  -     Hemoglobin A1C; Future    Iron deficiency anemia, unspecified iron deficiency anemia type    Elbow tendonitis   No red flags on chronic Hx or acute Hx. No s/s on exam to suggest emergent evaluation needed. Common pathologies of recurrent MSK discussed. Common triggers discussed. Common OTC and Rx modalities discussed. Discussed and/or given HEP.     Congestion of nasal sinus    Constipation, unspecified constipation type  -     linaCLOtide (LINZESS) 72 mcg Cap capsule; Take 1 capsule (72 mcg total) by mouth once daily.    Former smoker      Annual CT chest ordered  Precordial pain   Office and Emergency Department prompts discussed.  -     Stress Echo Which stress agent will be used? Treadmill Exercise; Color Flow Doppler? No; Future    Other orders  -     atorvastatin (LIPITOR) 80 MG tablet; Take 1 tablet (80 mg total) by mouth once daily.           Nakul Grady MD  Internal Medicine-[facility]        Side effects of medication(s) were discussed in detail and patient voiced understanding.  Patient will call back for any issues or complications.

## 2024-04-14 NOTE — PROGRESS NOTES
Your diabetes is getting more and more uncontrolled. Can you increase your metformin to 2 tablets in the morning and 2 tablets in the evening? Please let me know as soon as you know how much you can tolerate. Can cause difficult to control diarrhea and metamucil is helpful for this.

## 2024-04-16 ENCOUNTER — HOSPITAL ENCOUNTER (OUTPATIENT)
Dept: RADIOLOGY | Facility: OTHER | Age: 59
Discharge: HOME | End: 2024-04-16
Attending: INTERNAL MEDICINE
Payer: MEDICAID

## 2024-04-16 DIAGNOSIS — K75.81 NASH (NONALCOHOLIC STEATOHEPATITIS): ICD-10-CM

## 2024-04-16 PROCEDURE — 76705 ECHO EXAM OF ABDOMEN: CPT | Mod: TC

## 2024-04-16 PROCEDURE — 76705 ECHO EXAM OF ABDOMEN: CPT | Mod: 26,,, | Performed by: RADIOLOGY

## 2024-04-23 ENCOUNTER — HOSPITAL ENCOUNTER (OUTPATIENT)
Dept: RADIOLOGY | Facility: OTHER | Age: 59
Discharge: HOME | End: 2024-04-23
Attending: INTERNAL MEDICINE
Payer: MEDICAID

## 2024-04-23 DIAGNOSIS — Z87.891 FORMER SMOKER: ICD-10-CM

## 2024-04-23 PROCEDURE — 71271 CT THORAX LUNG CANCER SCR C-: CPT | Mod: 26,,, | Performed by: RADIOLOGY

## 2024-04-23 PROCEDURE — 71271 CT THORAX LUNG CANCER SCR C-: CPT | Mod: TC

## 2024-04-29 ENCOUNTER — TELEPHONE (OUTPATIENT)
Dept: INTERNAL MEDICINE | Facility: CLINIC | Age: 59
End: 2024-04-29
Payer: MEDICAID

## 2024-04-29 NOTE — TELEPHONE ENCOUNTER
Pt state she will increase dose to 2 in AM and 2 in evening. Told her to keep us updated on the tolerance.

## 2024-04-29 NOTE — TELEPHONE ENCOUNTER
----- Message from Nakul Jin MD sent at 4/29/2024  8:08 AM CDT -----  Please convey recent test results message to patient

## 2024-04-30 ENCOUNTER — HOSPITAL ENCOUNTER (OUTPATIENT)
Dept: CARDIOLOGY | Facility: OTHER | Age: 59
Discharge: HOME | End: 2024-04-30
Attending: INTERNAL MEDICINE
Payer: MEDICAID

## 2024-04-30 VITALS
DIASTOLIC BLOOD PRESSURE: 77 MMHG | HEIGHT: 66 IN | BODY MASS INDEX: 34.23 KG/M2 | SYSTOLIC BLOOD PRESSURE: 151 MMHG | HEART RATE: 61 BPM | WEIGHT: 213 LBS

## 2024-04-30 DIAGNOSIS — R07.2 PRECORDIAL PAIN: ICD-10-CM

## 2024-04-30 LAB
BSA FOR ECHO PROCEDURE: 2.12 M2
CV ECHO LV RWT: 0.43 CM
CV STRESS BASE HR: 61 BPM
DIASTOLIC BLOOD PRESSURE: 77 MMHG
ECHO LV POSTERIOR WALL: 0.98 CM (ref 0.6–1.1)
FRACTIONAL SHORTENING: 37 % (ref 28–44)
INTERVENTRICULAR SEPTUM: 0.98 CM (ref 0.6–1.1)
LEFT INTERNAL DIMENSION IN SYSTOLE: 2.87 CM (ref 2.1–4)
LEFT VENTRICLE DIASTOLIC VOLUME INDEX: 47.33 ML/M2
LEFT VENTRICLE DIASTOLIC VOLUME: 97.02 ML
LEFT VENTRICLE MASS INDEX: 75 G/M2
LEFT VENTRICLE SYSTOLIC VOLUME INDEX: 15.3 ML/M2
LEFT VENTRICLE SYSTOLIC VOLUME: 31.44 ML
LEFT VENTRICULAR INTERNAL DIMENSION IN DIASTOLE: 4.59 CM (ref 3.5–6)
LEFT VENTRICULAR MASS: 153.94 G
OHS CV CPX 1 MINUTE RECOVERY HEART RATE: 127 BPM
OHS CV CPX 85 PERCENT MAX PREDICTED HEART RATE MALE: 137
OHS CV CPX ESTIMATED METS: 10
OHS CV CPX MAX PREDICTED HEART RATE: 161
OHS CV CPX PATIENT IS FEMALE: 1
OHS CV CPX PATIENT IS MALE: 0
OHS CV CPX PEAK DIASTOLIC BLOOD PRESSURE: 82 MMHG
OHS CV CPX PEAK HEAR RATE: 160 BPM
OHS CV CPX PEAK RATE PRESSURE PRODUCT: NORMAL
OHS CV CPX PEAK SYSTOLIC BLOOD PRESSURE: 218 MMHG
OHS CV CPX PERCENT MAX PREDICTED HEART RATE ACHIEVED: 104
OHS CV CPX RATE PRESSURE PRODUCT PRESENTING: 9211
STRESS ANGINA INDEX: 0
STRESS DUKE TREADMILL SCORE: 8
STRESS ECHO POST EXERCISE DUR MIN: 7 MINUTES
STRESS ECHO POST EXERCISE DUR SEC: 50 SECONDS
STRESS ST DEPRESSION: 0 MM
STRESS ST ELEVATION: 0 MM
SYSTOLIC BLOOD PRESSURE: 151 MMHG
Z-SCORE OF LEFT VENTRICULAR DIMENSION IN END DIASTOLE: -2.93
Z-SCORE OF LEFT VENTRICULAR DIMENSION IN END SYSTOLE: -2.15

## 2024-04-30 PROCEDURE — 93351 STRESS TTE COMPLETE: CPT | Mod: 26,,, | Performed by: INTERNAL MEDICINE

## 2024-04-30 PROCEDURE — 93351 STRESS TTE COMPLETE: CPT

## 2024-06-23 ENCOUNTER — HOSPITAL ENCOUNTER (EMERGENCY)
Facility: OTHER | Age: 59
Discharge: HOME | End: 2024-06-23
Attending: EMERGENCY MEDICINE

## 2024-06-23 VITALS
TEMPERATURE: 99 F | OXYGEN SATURATION: 99 % | SYSTOLIC BLOOD PRESSURE: 150 MMHG | RESPIRATION RATE: 16 BRPM | DIASTOLIC BLOOD PRESSURE: 81 MMHG | HEART RATE: 65 BPM

## 2024-06-23 DIAGNOSIS — R10.9 FLANK PAIN: Primary | ICD-10-CM

## 2024-06-23 LAB
ALBUMIN SERPL BCP-MCNC: 3.5 G/DL (ref 3.5–5.2)
ALP SERPL-CCNC: 151 U/L (ref 55–135)
ALT SERPL W/O P-5'-P-CCNC: 16 U/L (ref 10–44)
ANION GAP SERPL CALC-SCNC: 9 MMOL/L (ref 8–16)
AST SERPL-CCNC: 14 U/L (ref 10–40)
BACTERIA #/AREA URNS HPF: NORMAL /HPF
BASOPHILS # BLD AUTO: 0.03 K/UL (ref 0–0.2)
BASOPHILS NFR BLD: 0.4 % (ref 0–1.9)
BILIRUB SERPL-MCNC: 0.2 MG/DL (ref 0.1–1)
BILIRUB UR QL STRIP: NEGATIVE
BUN SERPL-MCNC: 13 MG/DL (ref 6–20)
CALCIUM SERPL-MCNC: 9.5 MG/DL (ref 8.7–10.5)
CHLORIDE SERPL-SCNC: 105 MMOL/L (ref 95–110)
CLARITY UR: CLEAR
CO2 SERPL-SCNC: 24 MMOL/L (ref 23–29)
COLOR UR: YELLOW
CREAT SERPL-MCNC: 0.8 MG/DL (ref 0.5–1.4)
DIFFERENTIAL METHOD BLD: ABNORMAL
EOSINOPHIL # BLD AUTO: 0.3 K/UL (ref 0–0.5)
EOSINOPHIL NFR BLD: 3.2 % (ref 0–8)
ERYTHROCYTE [DISTWIDTH] IN BLOOD BY AUTOMATED COUNT: 16.9 % (ref 11.5–14.5)
EST. GFR  (NO RACE VARIABLE): >60 ML/MIN/1.73 M^2
GLUCOSE SERPL-MCNC: 117 MG/DL (ref 70–110)
GLUCOSE UR QL STRIP: NEGATIVE
HCT VFR BLD AUTO: 34.1 % (ref 37–48.5)
HGB BLD-MCNC: 10.9 G/DL (ref 12–16)
HGB UR QL STRIP: ABNORMAL
IMM GRANULOCYTES # BLD AUTO: 0.02 K/UL (ref 0–0.04)
IMM GRANULOCYTES NFR BLD AUTO: 0.2 % (ref 0–0.5)
KETONES UR QL STRIP: NEGATIVE
LEUKOCYTE ESTERASE UR QL STRIP: ABNORMAL
LYMPHOCYTES # BLD AUTO: 2.3 K/UL (ref 1–4.8)
LYMPHOCYTES NFR BLD: 27.7 % (ref 18–48)
MCH RBC QN AUTO: 24.1 PG (ref 27–31)
MCHC RBC AUTO-ENTMCNC: 32 G/DL (ref 32–36)
MCV RBC AUTO: 75 FL (ref 82–98)
MICROSCOPIC COMMENT: NORMAL
MONOCYTES # BLD AUTO: 0.6 K/UL (ref 0.3–1)
MONOCYTES NFR BLD: 7.1 % (ref 4–15)
NEUTROPHILS # BLD AUTO: 5.2 K/UL (ref 1.8–7.7)
NEUTROPHILS NFR BLD: 61.4 % (ref 38–73)
NITRITE UR QL STRIP: NEGATIVE
NRBC BLD-RTO: 0 /100 WBC
PH UR STRIP: 7 [PH] (ref 5–8)
PLATELET # BLD AUTO: 273 K/UL (ref 150–450)
PMV BLD AUTO: 10.4 FL (ref 9.2–12.9)
POTASSIUM SERPL-SCNC: 3.9 MMOL/L (ref 3.5–5.1)
PROT SERPL-MCNC: 7.6 G/DL (ref 6–8.4)
PROT UR QL STRIP: ABNORMAL
RBC # BLD AUTO: 4.53 M/UL (ref 4–5.4)
RBC #/AREA URNS HPF: 1 /HPF (ref 0–4)
SODIUM SERPL-SCNC: 138 MMOL/L (ref 136–145)
SP GR UR STRIP: 1.03 (ref 1–1.03)
SQUAMOUS #/AREA URNS HPF: 8 /HPF
URN SPEC COLLECT METH UR: ABNORMAL
UROBILINOGEN UR STRIP-ACNC: ABNORMAL EU/DL
WBC # BLD AUTO: 8.44 K/UL (ref 3.9–12.7)
WBC #/AREA URNS HPF: 1 /HPF (ref 0–5)

## 2024-06-23 PROCEDURE — 96374 THER/PROPH/DIAG INJ IV PUSH: CPT

## 2024-06-23 PROCEDURE — 81000 URINALYSIS NONAUTO W/SCOPE: CPT | Performed by: EMERGENCY MEDICINE

## 2024-06-23 PROCEDURE — 80053 COMPREHEN METABOLIC PANEL: CPT | Performed by: EMERGENCY MEDICINE

## 2024-06-23 PROCEDURE — 99284 EMERGENCY DEPT VISIT MOD MDM: CPT | Mod: 25

## 2024-06-23 PROCEDURE — 85025 COMPLETE CBC W/AUTO DIFF WBC: CPT | Performed by: EMERGENCY MEDICINE

## 2024-06-23 PROCEDURE — 63600175 PHARM REV CODE 636 W HCPCS: Performed by: EMERGENCY MEDICINE

## 2024-06-23 RX ORDER — KETOROLAC TROMETHAMINE 30 MG/ML
15 INJECTION, SOLUTION INTRAMUSCULAR; INTRAVENOUS
Status: COMPLETED | OUTPATIENT
Start: 2024-06-23 | End: 2024-06-23

## 2024-06-23 RX ORDER — TIZANIDINE 4 MG/1
4 TABLET ORAL EVERY 6 HOURS PRN
Qty: 20 TABLET | Refills: 0 | Status: SHIPPED | OUTPATIENT
Start: 2024-06-23 | End: 2024-07-03

## 2024-06-23 RX ORDER — KETOROLAC TROMETHAMINE 30 MG/ML
30 INJECTION, SOLUTION INTRAMUSCULAR; INTRAVENOUS
Status: DISCONTINUED | OUTPATIENT
Start: 2024-06-23 | End: 2024-06-23

## 2024-06-23 RX ORDER — IBUPROFEN 600 MG/1
600 TABLET ORAL EVERY 6 HOURS PRN
Qty: 20 TABLET | Refills: 0 | Status: SHIPPED | OUTPATIENT
Start: 2024-06-23

## 2024-06-23 RX ORDER — LIDOCAINE 50 MG/G
1 PATCH TOPICAL DAILY
Qty: 10 PATCH | Refills: 0 | Status: SHIPPED | OUTPATIENT
Start: 2024-06-23 | End: 2024-07-03

## 2024-06-23 RX ADMIN — KETOROLAC TROMETHAMINE 15 MG: 30 INJECTION, SOLUTION INTRAMUSCULAR; INTRAVENOUS at 04:06

## 2024-06-23 NOTE — ED PROVIDER NOTES
Source of History:  The patient    Chief complaint:  Flank Pain (R flank pain x3 days. Denies urinary symptoms, fall/trauma. Denies kidney hx.)      HPI:  Radha Valle is a 59 y.o. female  who complains of 3 days of right-sided flank pain.  She describes it is aching.  Denies any trauma.  States it is worse when she takes a deep breath.  Denies any fevers, chills, shortness of breath.  No chest pain.  No difficulty urinating.  She denies any cough.  States she did have an upper respiratory infection like symptoms about 1 month ago.  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    This is the extent to the patients complaints today here in the emergency department.    ROS:   See HPI.    Review of patient's allergies indicates:  No Known Allergies    PMH:  As per HPI and below:  Past Medical History:   Diagnosis Date    Bulging lumbar disc     Bulging of cervical intervertebral disc     Diabetes mellitus, type 2     Hyperlipidemia     Hypertension     Obese     Prediabetes     Vitamin D deficiency      Past Surgical History:   Procedure Laterality Date    COLONOSCOPY N/A 2021    Procedure: COLONOSCOPY;  Surgeon: Abhishek Sanderson MD;  Location: [facility];  Service: Endoscopy;  Laterality: N/A;    TUBAL LIGATION      WISDOM TOOTH EXTRACTION         Social History     Tobacco Use    Smoking status: Former     Current packs/day: 0.00     Average packs/day: 0.5 packs/day for 30.0 years (15.0 ttl pk-yrs)     Types: Cigarettes     Start date: 1993     Quit date: 2023     Years since quittin.3    Smokeless tobacco: Former   Substance Use Topics    Alcohol use: Yes     Comment: social    Drug use: No       Physical Exam:    BP (!) 150/81   Pulse 65   Temp 99.1 °F (37.3 °C) (Oral)   Resp 16   SpO2 99%   Nursing note and vital signs reviewed.  Constitutional: No acute distress.  Nontoxic  Cardiovascular: Regular rate and rhythm.  No murmurs. No gallops. No  rubs  Respiratory: Clear to auscultation bilaterally.  Good air movement.  No wheezes.  No rhonchi. No rales. No accessory muscle use..  Tenderness to palpation over the right lower ribs mid axillary line.  Also slight splinting with a deep breath.  Abdomen: Soft.  Not distended.  Nontender.  No guarding.  No rebound. Non-peritoneal.  Negative Mitchell's.  Extremities:  No pitting edema.  No obvious deformities.  Neuro: alert. At baseline.  No focal neurological deficits.    Summary of Previous Medical Records:        Differential Dx/ MDM/ Workup:  59-year-old female with history of diabetes as well as hypertension.  Here with right-sided pain over the ribs.  Her abdominal exam is benign I have considered but do not suspect cholelithiasis or cholecystitis.  Her vital signs are otherwise normal and she is afebrile.  Based on history I do not suspect infectious etiology such as COVID or pneumonia.  Will get a chest x-ray however to rule out any evidence of pneumonia.  Likely a pleuritic like chest pain or musculoskeletal.  I have also considered a  issue however based on the history and physical exam does not appear to be pyelonephritis or renal colic.      ED Course as of 06/23/24 1735   Sun Jun 23, 2024   1719 WBC, UA: 1 [SM]   1719 RBC, UA: 1 [SM]   1719 NITRITE UA: Negative [SM]   1719 Leukocyte Esterase, UA(!): 1+ [SM]   1719 WBC: 8.44 [SM]   1719 Hemoglobin(!): 10.9 [SM]   1719 Platelet Count: 273 [SM]   1719 X-Ray Chest 1 View  Chest x-ray independently interpreted by myself shows normal cardiac size, no infiltrate or focal consolidation, no pneumothorax, no bony abnormalities.  Overall impression negative chest x-ray. [SM]   1722 BILIRUBIN TOTAL: 0.2 [SM]   1722 AST: 14 [SM]   1722 ALT: 16 [SM]   1722 Sodium: 138 [SM]   1722 Potassium: 3.9 [SM]   1722 Creatinine: 0.8 [SM]   1733 Workup is unremarkable.  I suspect musculoskeletal in nature.  Will discharge to follow up with primary care.      No further workup  is indicated in the emergency department today.  I updated pt regarding results and I counseled pt regarding supportive care measures.  Diagnosis and treatment plan explained to patient. I have answered all questions and the patient is satisfied with the plan of care. Patient discharged home in stable condition.  [SM]      ED Course User Index  [SM] Jarad Black DO                 Diagnostic Impression:    1. Flank pain         ED Disposition Condition    Discharge Stable            ED Prescriptions       Medication Sig Dispense Start Date End Date Auth. Provider    ibuprofen (ADVIL,MOTRIN) 600 MG tablet Take 1 tablet (600 mg total) by mouth every 6 (six) hours as needed for Pain. 20 tablet 6/23/2024 -- Jarad Black DO    LIDOcaine (LIDODERM) 5 % Place 1 patch onto the skin once daily. Remove & Discard patch within 12 hours or as directed by MD for 10 days 10 patch 6/23/2024 7/3/2024 Jarad Black,     tiZANidine (ZANAFLEX) 4 MG tablet Take 1 tablet (4 mg total) by mouth every 6 (six) hours as needed. 20 tablet 6/23/2024 7/3/2024 Jarad Black DO          Follow-up Information       Follow up With Specialties Details Why Contact Info    Nakul Jin MD Internal Medicine In 2 weeks  [address] [address]  [phone]               Jarad Black DO  06/23/24 0040

## 2024-06-23 NOTE — DISCHARGE INSTRUCTIONS
I suspect your pain is due to inflammation of the ribs or the muscles between the ribs.  Use the medication and I suspect this will slowly improve symptoms over the next couple of weeks.  If symptoms do not improve or they start significantly worsening or you have new symptoms follow-up immediately with your primary care physician or return to the emergency department for re-evaluation.

## 2024-07-02 DIAGNOSIS — E11.9 CONTROLLED TYPE 2 DIABETES MELLITUS WITHOUT COMPLICATION, WITHOUT LONG-TERM CURRENT USE OF INSULIN: Primary | ICD-10-CM

## 2024-07-02 RX ORDER — METFORMIN HYDROCHLORIDE 500 MG/1
500 TABLET, EXTENDED RELEASE ORAL 2 TIMES DAILY WITH MEALS
Qty: 180 TABLET | Refills: 1 | Status: SHIPPED | OUTPATIENT
Start: 2024-07-02

## 2024-07-02 NOTE — TELEPHONE ENCOUNTER
No care due was identified.  [facility] Embedded Care Due Messages. Reference number: [id number].   7/02/2024 7:02:07 AM CDT

## 2024-07-02 NOTE — TELEPHONE ENCOUNTER
Refill Decision Note   Radha Valle  is requesting a refill authorization.  Brief Assessment and Rationale for Refill:  Approve     Medication Therapy Plan:  Reviewed acute care/admission visit notes; No follow up with PCP recommended by acute care provider; Approved per protocol      Extended chart review required: Yes   Comments:     Note composed:10:06 AM 07/02/2024

## 2024-09-14 DIAGNOSIS — E55.9 VITAMIN D DEFICIENCY: Primary | ICD-10-CM

## 2024-09-14 NOTE — TELEPHONE ENCOUNTER
Care Due:                  Date            Visit Type   Department     Provider  --------------------------------------------------------------------------------                                EP -                              PRIMARY      [facility] INTERNAL  Last Visit: 04-      CARE ([facility])   MEDICINE       Nakul Jin  Next Visit: None Scheduled  None         None Found                                                            Last  Test          Frequency    Reason                     Performed    Due Date  --------------------------------------------------------------------------------    HBA1C.......  6 months...  metFORMIN................  04-   10-    Lipid Panel.  12 months..  atorvastatin.............  10-   10-    Vitamin D...  12 months..  cholecalciferol,.........  10-   10-    [employer] Embedded Care Due Messages. Reference number: [id number].   9/14/2024 7:02:35 AM CDT

## 2024-09-16 RX ORDER — ASPIRIN 325 MG
50000 TABLET, DELAYED RELEASE (ENTERIC COATED) ORAL
Qty: 12 CAPSULE | Refills: 0 | Status: SHIPPED | OUTPATIENT
Start: 2024-09-16

## 2024-09-16 NOTE — TELEPHONE ENCOUNTER
Refill Encounter    PCP Visits: Recent Visits  Date Type Provider Dept   04/12/24 Office Visit Nakul Jin MD [facility] Internal Medicine   01/30/24 Office Visit Nakul Jin MD [facility] Internal Medicine   11/08/23 Office Visit Nakul Jin MD [facility] Internal Medicine   10/11/23 Office Visit Nakul Jin MD [facility] Internal Medicine   Showing recent visits within past 360 days and meeting all other requirements  Future Appointments  No visits were found meeting these conditions.  Showing future appointments within next 720 days and meeting all other requirements     Last 3 Blood Pressure:   BP Readings from Last 3 Encounters:   06/23/24 (!) 150/81   04/30/24 (!) 151/77   04/12/24 138/78     Preferred Pharmacy:   [facility] [address] [place] [id number]  Phone: [phone] Fax: [phone]    Requested RX:  Requested Prescriptions     Pending Prescriptions Disp Refills    cholecalciferol, vitamin D3, 1,250 mcg (50,000 unit) capsule [Pharmacy Med Name: Vitamin D3 1.25 MG (63451 UT) Oral Capsule] 12 capsule 0     Sig: Take 1 capsule by mouth once a week      RX Route: Normal

## 2024-10-10 ENCOUNTER — PATIENT MESSAGE (OUTPATIENT)
Dept: ADMINISTRATIVE | Facility: HOSPITAL | Age: 59
End: 2024-10-10
Payer: COMMERCIAL

## 2024-12-03 DIAGNOSIS — I10 ESSENTIAL HYPERTENSION: ICD-10-CM

## 2024-12-03 RX ORDER — IRBESARTAN 300 MG/1
300 TABLET ORAL NIGHTLY
Qty: 90 TABLET | Refills: 0 | Status: SHIPPED | OUTPATIENT
Start: 2024-12-03

## 2024-12-03 NOTE — TELEPHONE ENCOUNTER
Refill Encounter    PCP Visits: Recent Visits  Date Type Provider Dept   04/12/24 Office Visit Nakul Jin MD [facility] Internal Medicine   01/30/24 Office Visit Nakul Jin MD [facility] Internal Medicine   Showing recent visits within past 360 days and meeting all other requirements  Future Appointments  Date Type Provider Dept   12/05/24 Appointment Nakul Jin MD [facility] Internal Medicine   Showing future appointments within next 720 days and meeting all other requirements     Last 3 Blood Pressure:   BP Readings from Last 3 Encounters:   06/23/24 (!) 150/81   04/30/24 (!) 151/77   04/12/24 138/78     Preferred Pharmacy:   [facility] [address] [place] [address]  Phone: [phone] Fax: [phone]    Requested RX:  Requested Prescriptions     Pending Prescriptions Disp Refills    irbesartan (AVAPRO) 300 MG tablet [Pharmacy Med Name: Irbesartan 300 MG Oral Tablet] 90 tablet 0     Sig: TAKE 1 TABLET BY MOUTH ONCE DAILY IN THE EVENING      RX Route: Normal

## 2024-12-03 NOTE — TELEPHONE ENCOUNTER
Care Due:                  Date            Visit Type   Department     Provider  --------------------------------------------------------------------------------                                EP -                              PRIMARY      [facility] INTERNAL  Last Visit: 04-      CARE ([facility])   MEDICINE       Nakul Jin                              MYCHART                              ANNUAL                              CHECKUP/PHY  [facility] INTERNAL  Next Visit: 12-      S            MEDICINE       Nakul Jin                                                            Last  Test          Frequency    Reason                     Performed    Due Date  --------------------------------------------------------------------------------    HBA1C.......  6 months...  metFORMIN................  04-   10-    Lipid Panel.  12 months..  atorvastatin.............  10-   10-    Vitamin D...  12 months..  cholecalciferol,.........  10-   10-    [employer] Embedded Care Due Messages. Reference number: [id number].   12/03/2024 7:02:13 AM CST

## 2024-12-03 NOTE — TELEPHONE ENCOUNTER
Requested refill has been sent to pharmacy. If patient is due for visit then please schedule appointment. If not then please contact patient and see if they have home blood pressure readings or the ability to check their blood pressure at home. Please obtain their most recent blood pressure along with an average. If they do not have home BP then please schedule follow up within 7-10 days for nurse visit for BP check. Please make sure patient has all meds prescribed and is taking all medications prior to their nurse visit.     Respectfully,  Nakul Grady

## 2024-12-06 DIAGNOSIS — E55.9 VITAMIN D DEFICIENCY: ICD-10-CM

## 2024-12-06 NOTE — TELEPHONE ENCOUNTER
Refill Encounter    PCP Visits: Recent Visits  Date Type Provider Dept   04/12/24 Office Visit Nakul Jin MD [facility] Internal Medicine   01/30/24 Office Visit Nakul Jin MD [facility] Internal Medicine   Showing recent visits within past 360 days and meeting all other requirements  Future Appointments  No visits were found meeting these conditions.  Showing future appointments within next 720 days and meeting all other requirements     Last 3 Blood Pressure:   BP Readings from Last 3 Encounters:   06/23/24 (!) 150/81   04/30/24 (!) 151/77   04/12/24 138/78     Preferred Pharmacy:   [facility] [address] [place] [address] [address] [place] [address]  Phone: [phone] Fax: [phone]    Requested RX:  Requested Prescriptions     Pending Prescriptions Disp Refills    cholecalciferol, vitamin D3, 1,250 mcg (50,000 unit) capsule [Pharmacy Med Name: Vitamin D3 1.25 MG (93815 UT) Oral Capsule] 12 capsule 0     Sig: Take 1 capsule by mouth once a week      RX Route: Normal

## 2024-12-06 NOTE — TELEPHONE ENCOUNTER
No care due was identified.  [facility] Embedded Care Due Messages. Reference number: [id number].   12/06/2024 7:02:12 AM CST

## 2024-12-09 RX ORDER — ASPIRIN 325 MG
50000 TABLET, DELAYED RELEASE (ENTERIC COATED) ORAL
Qty: 12 CAPSULE | Refills: 2 | Status: SHIPPED | OUTPATIENT
Start: 2024-12-09

## 2025-01-09 ENCOUNTER — PATIENT MESSAGE (OUTPATIENT)
Dept: ADMINISTRATIVE | Facility: HOSPITAL | Age: 60
End: 2025-01-09
Payer: COMMERCIAL

## 2025-01-20 ENCOUNTER — PATIENT MESSAGE (OUTPATIENT)
Dept: ADMINISTRATIVE | Facility: HOSPITAL | Age: 60
End: 2025-01-20
Payer: COMMERCIAL

## 2025-02-12 DIAGNOSIS — I10 ESSENTIAL HYPERTENSION: ICD-10-CM

## 2025-02-12 DIAGNOSIS — E55.9 VITAMIN D DEFICIENCY: ICD-10-CM

## 2025-02-12 NOTE — TELEPHONE ENCOUNTER
Care Due:                  Date            Visit Type   Department     Provider  --------------------------------------------------------------------------------                                EP -                              PRIMARY      [facility] INTERNAL  Last Visit: 04-      [facility] ([facility])   MEDICINE       Nakul Jin  Next Visit: None Scheduled  None         None Found                                                            Last  Test          Frequency    Reason                     Performed    Due Date  --------------------------------------------------------------------------------    Office Visit  12 months..  cholecalciferol,,          04- 04-                             linaCLOtide..............    HBA1C.......  6 months...  metFORMIN................  04-   10-    Lipid Panel.  12 months..  atorvastatin.............  10-   10-    Vitamin D...  12 months..  cholecalciferol,.........  10-   10-    [facility] Embedded Care Due Messages. Reference number: [id number].   2/12/2025 8:23:31 AM CST

## 2025-02-12 NOTE — TELEPHONE ENCOUNTER
Refill Encounter    PCP Visits: Recent Visits  Date Type Provider Dept   04/12/24 Office Visit Nakul Jin MD [facility] [place] [facility]   Showing recent visits within past 360 days and meeting all other requirements  Future Appointments  Date Type Provider Dept   03/17/25 Appointment Nakul Jin MD [facility] [place] [facility]   Showing future appointments within next 720 days and meeting all other requirements     Last 3 Blood Pressure:   BP Readings from Last 3 Encounters:   06/23/24 (!) 150/81   04/30/24 (!) 151/77   04/12/24 138/78     Preferred Pharmacy:   [address] [facility]  [address] [facility] [address]  Phone: [phone] Fax: [phone]    Requested RX:  Requested Prescriptions     Pending Prescriptions Disp Refills    irbesartan (AVAPRO) 300 MG tablet 90 tablet 0     Sig: Take 1 tablet (300 mg total) by mouth every evening.    cholecalciferol, vitamin D3, 1,250 mcg (50,000 unit) capsule 12 capsule 2     Sig: Take 1 capsule (50,000 Units total) by mouth every 7 days.      RX Route: Normal

## 2025-02-13 RX ORDER — ASPIRIN 325 MG
50000 TABLET, DELAYED RELEASE (ENTERIC COATED) ORAL
Qty: 12 CAPSULE | Refills: 2 | Status: SHIPPED | OUTPATIENT
Start: 2025-02-13

## 2025-02-13 RX ORDER — IRBESARTAN 300 MG/1
300 TABLET ORAL NIGHTLY
Qty: 90 TABLET | Refills: 0 | Status: SHIPPED | OUTPATIENT
Start: 2025-02-13

## 2025-03-03 ENCOUNTER — PATIENT OUTREACH (OUTPATIENT)
Dept: ADMINISTRATIVE | Facility: HOSPITAL | Age: 60
End: 2025-03-03
Payer: COMMERCIAL

## 2025-03-17 ENCOUNTER — HOSPITAL ENCOUNTER (OUTPATIENT)
Dept: RADIOLOGY | Facility: OTHER | Age: 60
Discharge: HOME | End: 2025-03-17
Attending: INTERNAL MEDICINE
Payer: COMMERCIAL

## 2025-03-17 ENCOUNTER — TELEPHONE (OUTPATIENT)
Dept: INTERNAL MEDICINE | Facility: CLINIC | Age: 60
End: 2025-03-17

## 2025-03-17 ENCOUNTER — PROCEDURE VISIT (OUTPATIENT)
Dept: HEPATOLOGY | Facility: CLINIC | Age: 60
End: 2025-03-17
Payer: COMMERCIAL

## 2025-03-17 ENCOUNTER — OFFICE VISIT (OUTPATIENT)
Dept: INTERNAL MEDICINE | Facility: CLINIC | Age: 60
End: 2025-03-17
Attending: INTERNAL MEDICINE
Payer: COMMERCIAL

## 2025-03-17 VITALS
SYSTOLIC BLOOD PRESSURE: 144 MMHG | BODY MASS INDEX: 33.8 KG/M2 | HEART RATE: 57 BPM | DIASTOLIC BLOOD PRESSURE: 86 MMHG | WEIGHT: 210.31 LBS | HEIGHT: 66 IN | OXYGEN SATURATION: 99 %

## 2025-03-17 DIAGNOSIS — M54.16 LUMBAR RADICULOPATHY: ICD-10-CM

## 2025-03-17 DIAGNOSIS — R07.89 INTERMITTENT LEFT-SIDED CHEST PAIN: Primary | ICD-10-CM

## 2025-03-17 DIAGNOSIS — R73.03 PREDIABETES: ICD-10-CM

## 2025-03-17 DIAGNOSIS — R00.2 PALPITATIONS: ICD-10-CM

## 2025-03-17 DIAGNOSIS — D64.9 ANEMIA, UNSPECIFIED TYPE: ICD-10-CM

## 2025-03-17 DIAGNOSIS — I10 ESSENTIAL HYPERTENSION: ICD-10-CM

## 2025-03-17 DIAGNOSIS — E11.9 CONTROLLED TYPE 2 DIABETES MELLITUS WITHOUT COMPLICATION, WITHOUT LONG-TERM CURRENT USE OF INSULIN: ICD-10-CM

## 2025-03-17 DIAGNOSIS — R25.2 MUSCLE CRAMPS: ICD-10-CM

## 2025-03-17 DIAGNOSIS — Z87.891 FORMER SMOKER: ICD-10-CM

## 2025-03-17 DIAGNOSIS — R74.8 ELEVATED ALKALINE PHOSPHATASE LEVEL: ICD-10-CM

## 2025-03-17 DIAGNOSIS — K75.81 NASH (NONALCOHOLIC STEATOHEPATITIS): ICD-10-CM

## 2025-03-17 DIAGNOSIS — K59.00 CONSTIPATION, UNSPECIFIED CONSTIPATION TYPE: ICD-10-CM

## 2025-03-17 PROCEDURE — 4010F ACE/ARB THERAPY RXD/TAKEN: CPT | Mod: CPTII,S$GLB,, | Performed by: INTERNAL MEDICINE

## 2025-03-17 PROCEDURE — 92228 IMG RTA DETC/MNTR DS PHY/QHP: CPT | Mod: TC,S$GLB,, | Performed by: INTERNAL MEDICINE

## 2025-03-17 PROCEDURE — 1159F MED LIST DOCD IN RCRD: CPT | Mod: CPTII,S$GLB,, | Performed by: INTERNAL MEDICINE

## 2025-03-17 PROCEDURE — 92228 IMG RTA DETC/MNTR DS PHY/QHP: CPT | Mod: 26,S$GLB,, | Performed by: OPTOMETRIST

## 2025-03-17 PROCEDURE — 99215 OFFICE O/P EST HI 40 MIN: CPT | Mod: 25,S$GLB,, | Performed by: INTERNAL MEDICINE

## 2025-03-17 PROCEDURE — 3077F SYST BP >= 140 MM HG: CPT | Mod: CPTII,S$GLB,, | Performed by: INTERNAL MEDICINE

## 2025-03-17 PROCEDURE — 1160F RVW MEDS BY RX/DR IN RCRD: CPT | Mod: CPTII,S$GLB,, | Performed by: INTERNAL MEDICINE

## 2025-03-17 PROCEDURE — 71271 CT THORAX LUNG CANCER SCR C-: CPT | Mod: 26,,, | Performed by: RADIOLOGY

## 2025-03-17 PROCEDURE — 91200 LIVER ELASTOGRAPHY: CPT | Mod: S$GLB,,, | Performed by: PHYSICIAN ASSISTANT

## 2025-03-17 PROCEDURE — 3079F DIAST BP 80-89 MM HG: CPT | Mod: CPTII,S$GLB,, | Performed by: INTERNAL MEDICINE

## 2025-03-17 PROCEDURE — 3008F BODY MASS INDEX DOCD: CPT | Mod: CPTII,S$GLB,, | Performed by: INTERNAL MEDICINE

## 2025-03-17 PROCEDURE — 99999 PR PBB SHADOW E&M-EST. PATIENT-LVL IV: CPT | Mod: PBBFAC,,, | Performed by: INTERNAL MEDICINE

## 2025-03-17 PROCEDURE — 71271 CT THORAX LUNG CANCER SCR C-: CPT | Mod: TC

## 2025-03-17 PROCEDURE — 2025F 7 FLD RTA PHOTO W/O RTNOPTHY: CPT | Mod: CPTII,S$GLB,, | Performed by: OPTOMETRIST

## 2025-03-17 RX ORDER — AMLODIPINE BESYLATE 5 MG/1
5 TABLET ORAL DAILY
Qty: 90 TABLET | Refills: 3 | Status: SHIPPED | OUTPATIENT
Start: 2025-03-17 | End: 2026-03-17

## 2025-03-17 NOTE — PROGRESS NOTES
Radha Valle is a 60 y.o. female here for a diabetic eye screening with non-dilated fundus photos per Dr. Jin.    Patient cooperative?: Yes  Small pupils?: No  Last eye exam: 1 yr ago     For exam results, see Encounter Report.

## 2025-03-17 NOTE — PROGRESS NOTES
Subjective:       Patient ID: Radha Valle is a 60 y.o. female.    Chief Complaint: Hypertension (6m), Diabetes (6m), Generalized Body Aches (Left leg up to calf and buttock area and lower back.), and Family Hx of blood clots (Pt mother and brother. )    Here for routine f/u      Pains in calves, 2-3 times a week. Shotes up back of leg to thigh to buttock to low back lower. No weakness.    Left sided CP under left breast, last for hours at a time. Constant non radiating, Couple times a week for 6 months. Previously reported palpitations once to twice a week. Symptoms do not occur with exertion. Stress test normal 4/2024           ### DM ###  Prior Meds: metformin caused GI upset. Stopped januvia 01/2024 due to frequent HA.   -4/2024 rec to increase metformin to 2 tab BID             HGBA1C                   8.0 (H)             04/12/2024 01:28 PM        HGBA1C                   7.8 (H)             10/11/2023 02:19 PM        HGBA1C                   7.5 (H)             07/07/2023 01:54 PM        HGBA1C                   7.4 (H)             12/30/2022 12:48 PM        HGBA1C                   7.4 (H)             12/30/2022 12:48 PM        HGBA1C                   6.0 (H)             08/14/2019 02:03 PM        HGBA1C                   6.3 (H)             04/01/2019 10:23 AM        HGBA1C                   6.1 (H)             08/28/2018 10:23 AM        HGBA1C                   5.9 (H)             08/01/2017 09:50 AM        HGBA1C                   6.4 (H)             11/17/2016 08:40 AM        ### HTN ###  Irbesartan 300 coreg 3.125 BID (has concomitant palpitations)   3/2025 Start amlodipine     ### CORDERO ###  FIB-4 Calculation: 0.9 at 3/17/2025 10:49 AM  SGOT/AST: 20 U/L at 3/17/2025 10:49 AM  SGPT/ALT: 23 U/L at 3/17/2025 10:49 AM  Platelets: 279 K/uL at 3/17/2025 10:49 AM  Age: 60 years             ALKALINEPHOS             183 (H)             10/11/2023 02:19 PM   -FIbroscan/ELF ordered to compare to  "FIB4    #### Former smoker  Pack years: >0.5 for 30 yrs= 20+  Quit:03/2023  Shared decision making discussed. Pt voiced understanding and is amenable to the r/b of annual lung cancer screening with low dose, non contrast CT.  Ct Chest Due 2025        ### chronic rhinitis ###         Prior HPI: Within 7-10 days of starting lipitor 40mg she has been having cramps of her thighs and calves at night, 3-4 times a week.       History of Present Illness              Review of Systems   Constitutional:  Negative for chills, fatigue, fever and unexpected weight change.   HENT:  Negative for ear pain, hearing loss, postnasal drip, tinnitus, trouble swallowing and voice change.    Respiratory:  Negative for cough, chest tightness, shortness of breath and wheezing.    Cardiovascular:  Negative for chest pain, palpitations and leg swelling.   Gastrointestinal:  Negative for abdominal pain, blood in stool, diarrhea, nausea and vomiting.   Endocrine: Negative for polydipsia, polyphagia and polyuria.   Genitourinary:  Negative for difficulty urinating, dysuria, hematuria and vaginal bleeding.   Skin:  Negative for rash.   Allergic/Immunologic: Negative for food allergies.   Neurological:  Negative for dizziness, numbness and headaches.   Hematological:  Does not bruise/bleed easily.   Psychiatric/Behavioral:  The patient is not nervous/anxious.        Objective:      Vitals:    03/17/25 0913   BP: (!) 144/86   BP Location: Right arm   Patient Position: Sitting   Pulse: (!) 57   SpO2: 99%   Weight: 95.4 kg (210 lb 5.1 oz)   Height: 5' 6" (1.676 m)      Physical Exam  Vitals and nursing note reviewed.   Constitutional:       General: She is not in acute distress.     Appearance: Normal appearance. She is well-developed.   HENT:      Head: Normocephalic and atraumatic.      Mouth/Throat:      Pharynx: No oropharyngeal exudate.   Eyes:      General: No scleral icterus.     Conjunctiva/sclera: Conjunctivae normal.      Pupils: Pupils are " equal, round, and reactive to light.   Neck:      Thyroid: No thyromegaly.   Cardiovascular:      Rate and Rhythm: Normal rate and regular rhythm.      Pulses:           Dorsalis pedis pulses are 1+ on the right side and 1+ on the left side.      Heart sounds: Normal heart sounds. No murmur heard.  Pulmonary:      Effort: Pulmonary effort is normal.      Breath sounds: Normal breath sounds. No wheezing or rales.   Abdominal:      General: There is no distension.   Musculoskeletal:         General: No tenderness.   Feet:      Right foot:      Protective Sensation: 8 sites tested.  8 sites sensed.      Skin integrity: No ulcer or blister.      Left foot:      Protective Sensation: 8 sites tested.  8 sites sensed.      Skin integrity: No ulcer or blister.   Lymphadenopathy:      Cervical: No cervical adenopathy.   Skin:     General: Skin is warm and dry.   Neurological:      Mental Status: She is alert and oriented to person, place, and time.   Psychiatric:         Behavior: Behavior normal.         Assessment:       1. Intermittent left-sided chest pain    2. Palpitations    3. Essential hypertension    4. Controlled type 2 diabetes mellitus without complication, without long-term current use of insulin    5. CORDERO (nonalcoholic steatohepatitis)    6. Elevated alkaline phosphatase level    7. Prediabetes    8. Muscle cramps    9. Constipation, unspecified constipation type    10. Former smoker    11. Anemia, unspecified type    12. Lumbar radiculopathy        Plan:       Radha was seen today for hypertension, diabetes, generalized body aches and family hx of blood clots.    Diagnoses and all orders for this visit:    Intermittent left-sided chest pain  Coronary ruled out.   -     Holter monitor - 48 hour; Future    Palpitations  -     Holter monitor - 48 hour; Future    Essential hypertension   START-     amLODIPine (NORVASC) 5 MG tablet; Take 1 tablet (5 mg total) by mouth once daily.   f/u in 7-10 days for nurse  visit for BP check    Controlled type 2 diabetes mellitus without complication, without long-term current use of insulin  -     Diabetic Eye Screening Photo; Future  -     Enhanced Liver Fibrosis (ELF); Future    CORDERO (nonalcoholic steatohepatitis)  -     Lipid Panel; Future  -     Comprehensive Metabolic Panel; Future  -     FibroScan (Vibration Controlled Transient Elastography); Future  -     Enhanced Liver Fibrosis (ELF); Future    Elevated alkaline phosphatase level  -     Enhanced Liver Fibrosis (ELF); Future    Prediabetes  -     Hemoglobin A1C; Future  -     Microalbumin/Creatinine Ratio, Urine; Future    Muscle cramps   Suspect lumbar radiculopathy   Constipation, unspecified constipation type  -     INCREASE linaCLOtide (LINZESS) 145 mcg Cap capsule; Take 1 capsule (145 mcg total) by mouth before breakfast.    Former smoker  -     CT Chest Lung Screening Low Dose; Future    Anemia, unspecified type  -     Ferritin; Future  -     Iron and TIBC; Future  -     CBC Auto Differential; Future    Lumbar radiculopathy  -     Ambulatory referral/consult to Spine Care; Future   No red flags on chronic Hx or acute Hx. No s/s on exam to suggest emergent evaluation needed. Common pathologies of recurrent MSK discussed. Common triggers discussed. Common OTC and Rx modalities discussed. Discussed and/or given HEP.     >40 minutes were spent today reviewing patient chart.  Much of today's visit was spent discussing with patient my chart review, their concerns, health maintenance, my assessment, and recommendations.                 Assessment & Plan          Visit today is associated with current or anticipated ongoing medical care related to this patient's single serious condition/complex condition of MASLD, preDM, Former smoker. The patient will return to see me as these issues will be followed longitudinally.      This note was generated with the assistance of ambient listening technology. Verbal consent was obtained by  the patient and accompanying visitor(s) for the recording of patient appointment to facilitate this note. I attest to having reviewed and edited the generated note for accuracy, though some syntax or spelling errors may persist. Please contact the author of this note for any clarification.      Nakul Grady MD  Internal Medicine-[facility]        Side effects of medication(s) were discussed in detail and patient voiced understanding.  Patient will call back for any issues or complications.

## 2025-03-18 ENCOUNTER — RESULTS FOLLOW-UP (OUTPATIENT)
Dept: INTERNAL MEDICINE | Facility: CLINIC | Age: 60
End: 2025-03-18

## 2025-03-20 NOTE — PROCEDURES
FibroScan (Vibration Controlled Transient Elastography)    Date/Time: 3/17/2025 11:00 AM    Performed by: Scheuermann, Jennifer B., PA  Authorized by: Nakul Jin MD    Diagnosis:  NAFLD    Probe:  XL    Universal Protocol: Patient's identity, procedure and site were verified, confirmatory pause was performed.  Discussed procedure including risks and potential complications.  Questions answered.  Patient verbalizes understanding and wishes to proceed with VCTE.     Procedure: After providing explanations of the procedure, patient was placed in the supine position with right arm in maximum abduction to allow optimal exposure of right lateral abdomen.  Patient was briefly assessed, Testing was performed in the mid-axillary location, 50Hz Shear Wave pulses were applied and the resulting Shear Wave and Propagation Speed detected with a 3.5 MHz ultrasonic signal, using the FibroScan probe, Skin to liver capsule distance and liver parenchyma were accessed during the entire examination with the FibroScan probe, Patient was instructed to breathe normally and to abstain from sudden movements during the procedure, allowing for random measurements of liver stiffness. At least 10 Shear Waves were produced, Individual measurements of each Shear Wave were calculated.  Patient tolerated the procedure well with no complications.  Meets discharge criteria as was dismissed.  Rates pain 0 out of 10.  Patient will follow up with ordering provider to review results.    Findings  Median liver stiffness score:  5.5  CAP Reading: dB/m:  300    IQR/med %:  8  Interpretation  Fibrosis interpretation is based on medial liver stiffness - Kilopascal (kPa).    Fibrosis Stage:  F 0-1  Steatosis interpretation is based on controlled attenuation parameter - (dB/m).    Steatosis Grade:  S3

## 2025-03-25 ENCOUNTER — HOSPITAL ENCOUNTER (OUTPATIENT)
Dept: CARDIOLOGY | Facility: OTHER | Age: 60
Discharge: HOME | End: 2025-03-25
Attending: INTERNAL MEDICINE
Payer: COMMERCIAL

## 2025-03-25 DIAGNOSIS — R07.89 INTERMITTENT LEFT-SIDED CHEST PAIN: ICD-10-CM

## 2025-03-25 DIAGNOSIS — R00.2 PALPITATIONS: ICD-10-CM

## 2025-03-25 PROCEDURE — 93225 XTRNL ECG REC<48 HRS REC: CPT

## 2025-03-25 PROCEDURE — 93227 XTRNL ECG REC<48 HR R&I: CPT | Mod: ,,, | Performed by: INTERNAL MEDICINE

## 2025-03-28 ENCOUNTER — HOSPITAL ENCOUNTER (OUTPATIENT)
Dept: RADIOLOGY | Facility: OTHER | Age: 60
Discharge: HOME | End: 2025-03-28
Attending: INTERNAL MEDICINE
Payer: COMMERCIAL

## 2025-03-28 DIAGNOSIS — Z12.31 ENCOUNTER FOR SCREENING MAMMOGRAM FOR BREAST CANCER: ICD-10-CM

## 2025-03-28 PROCEDURE — 77063 BREAST TOMOSYNTHESIS BI: CPT | Mod: 26,,, | Performed by: RADIOLOGY

## 2025-03-28 PROCEDURE — 77067 SCR MAMMO BI INCL CAD: CPT | Mod: 26,,, | Performed by: RADIOLOGY

## 2025-03-28 PROCEDURE — 77067 SCR MAMMO BI INCL CAD: CPT | Mod: TC

## 2025-03-29 LAB
OHS CV EVENT MONITOR DAY: 0
OHS CV HOLTER LENGTH DECIMAL HOURS: 47.98
OHS CV HOLTER LENGTH HOURS: 47
OHS CV HOLTER LENGTH MINUTES: 59
OHS CV HOLTER SINUS AVERAGE HR: 75
OHS CV HOLTER SINUS MAX HR: 148
OHS CV HOLTER SINUS MIN HR: 51

## 2025-04-04 ENCOUNTER — CLINICAL SUPPORT (OUTPATIENT)
Dept: INTERNAL MEDICINE | Facility: CLINIC | Age: 60
End: 2025-04-04
Payer: COMMERCIAL

## 2025-04-04 ENCOUNTER — TELEPHONE (OUTPATIENT)
Dept: INTERNAL MEDICINE | Facility: CLINIC | Age: 60
End: 2025-04-04

## 2025-04-04 VITALS — HEART RATE: 61 BPM | DIASTOLIC BLOOD PRESSURE: 70 MMHG | SYSTOLIC BLOOD PRESSURE: 136 MMHG | OXYGEN SATURATION: 98 %

## 2025-04-04 DIAGNOSIS — I10 ESSENTIAL HYPERTENSION: Primary | ICD-10-CM

## 2025-04-04 PROCEDURE — 99999 PR PBB SHADOW E&M-EST. PATIENT-LVL III: CPT | Mod: PBBFAC,,,

## 2025-04-04 NOTE — PROGRESS NOTES
Radha Valle 60 y.o. female is here for Blood Pressure check. in person    Manual Blood pressure reading was  148/74, Pulse 62. (Checked at the end of the visit)    If high, was it repeated after 15 minutes? yes 136/70 Pulse 61    Pt's Home blood pressure machine read in office NO, Pulse N/A.     Diagnosed with Hypertension yes.    Patient took blood pressure medication today yes.  Last dose of blood pressure medication was taken at 10 AM . Patient took 1. Amlodipine 5 mg daily 2. Carvedilol 3.125 mg PO BID ( states not taking ) 3. Irbesartan 300 mg PO daily ( states not taking )  Given copy of Medication list to compare with bottles at home..     All Medications and OTC medication updated yes    Does patient have record of home blood pressure readings / Blood Pressure Log no. Given BP log sheet with BP ranges .  Will try to take BP weekly    Does the pt have any complaints today in regards to their blood pressure medication? no. Complains of None. Patient is asymptomatic.     Were you sitting still for 5-10 minutes prior to taking your Blood pressure? yes     Has your blood pressure monitor ever been checked? no When was last time we checked your blood pressure monitor? N/A    Updated vitals Yes    Follow up date is 09/1725.     Dr. Jin notified.     Creatinine   Date Value Ref Range Status   03/17/2025 0.8 0.5 - 1.4 mg/dL Final     Sodium   Date Value Ref Range Status   03/17/2025 138 136 - 145 mmol/L Final     Potassium   Date Value Ref Range Status   03/17/2025 5.0 3.5 - 5.1 mmol/L Final

## 2025-04-04 NOTE — TELEPHONE ENCOUNTER
Radha Valle 60 y.o. female is here for Blood Pressure check. in person     Manual Blood pressure reading was  148/74, Pulse 62. (Checked at the end of the visit)     If high, was it repeated after 15 minutes? yes 136/70 Pulse 61     Pt's Home blood pressure machine read in office NO, Pulse N/A.      Diagnosed with Hypertension yes.     Patient took blood pressure medication today yes.  Last dose of blood pressure medication was taken at 10 AM . Patient took 1. Amlodipine 5 mg daily 2. Carvedilol 3.125 mg PO BID ( states not taking ) 3. Irbesartan 300 mg PO daily ( states not taking )  Given copy of Medication list to compare with bottles at home.. Wonders if she was to continue one of the other two  along with the Amlodipine that she stopped.     All Medications and OTC medication updated yes     Does patient have record of home blood pressure readings / Blood Pressure Log no. Given BP log sheet with BP ranges .  Will try to take BP weekly     Does the pt have any complaints today in regards to their blood pressure medication? no. Complains of None. Patient is asymptomatic.      Were you sitting still for 5-10 minutes prior to taking your Blood pressure? yes      Has your blood pressure monitor ever been checked? no When was last time we checked your blood pressure monitor? N/A     Updated vitals Yes     Follow up date is 09/1725.      Dr. Jin notified.            Creatinine   Date Value Ref Range Status   03/17/2025 0.8 0.5 - 1.4 mg/dL Final            Sodium   Date Value Ref Range Status   03/17/2025 138 136 - 145 mmol/L Final            Potassium   Date Value Ref Range Status   03/17/2025 5.0 3.5 - 5.1 mmol/L Final

## 2025-04-24 ENCOUNTER — TELEPHONE (OUTPATIENT)
Dept: NEUROSURGERY | Facility: CLINIC | Age: 60
End: 2025-04-24
Payer: COMMERCIAL

## 2025-04-24 ENCOUNTER — PATIENT MESSAGE (OUTPATIENT)
Dept: FAMILY MEDICINE | Facility: CLINIC | Age: 60
End: 2025-04-24
Payer: COMMERCIAL

## 2025-05-16 ENCOUNTER — OFFICE VISIT (OUTPATIENT)
Dept: OBSTETRICS AND GYNECOLOGY | Facility: CLINIC | Age: 60
End: 2025-05-16
Payer: COMMERCIAL

## 2025-05-16 VITALS
BODY MASS INDEX: 33.25 KG/M2 | SYSTOLIC BLOOD PRESSURE: 137 MMHG | HEIGHT: 66 IN | HEART RATE: 66 BPM | WEIGHT: 206.88 LBS | DIASTOLIC BLOOD PRESSURE: 82 MMHG

## 2025-05-16 DIAGNOSIS — Z12.4 CERVICAL CANCER SCREENING: ICD-10-CM

## 2025-05-16 DIAGNOSIS — R32 BLADDER LEAK: ICD-10-CM

## 2025-05-16 DIAGNOSIS — Z20.2 EXPOSURE TO SEXUALLY TRANSMITTED DISEASE (STD): ICD-10-CM

## 2025-05-16 DIAGNOSIS — Z01.419 WELL WOMAN EXAM WITH ROUTINE GYNECOLOGICAL EXAM: Primary | ICD-10-CM

## 2025-05-16 DIAGNOSIS — N89.8 VAGINAL DRYNESS: ICD-10-CM

## 2025-05-16 PROCEDURE — 99999 PR PBB SHADOW E&M-EST. PATIENT-LVL IV: CPT | Mod: PBBFAC,,,

## 2025-05-16 RX ORDER — ESTRADIOL 0.1 MG/G
1 CREAM VAGINAL DAILY
Qty: 42.5 G | Refills: 6 | Status: SHIPPED | OUTPATIENT
Start: 2025-05-16 | End: 2026-05-16

## 2025-05-16 NOTE — PROGRESS NOTES
CC: Annual / Well Woman Exam    HPI:   Pt is a 60 y.o.  female who presents for routine annual exam. She uses BTL for contraception. She does want STD screening today.   She is sexually active with 1 male partner.  denies pain with sex, changes in/pain with bowel/bladder habits, changes in appetite, or headaches. She reports she is safe at home.  denies abnormal vaginal bleeding, abnormal vaginal discharge, vaginal itching/irritation, vaginal odor, burning with urination, or urinary frequency.   Menopausal since late 30's per patient. Denies hot flashes and night sweats.   Reports spotting after sex from dryness, constant vaginal dryness and irritation even after sex and would like to discuss treatment today.  Would like to discuss treatment for urine leaking as she is currently wearing panty liners daily because she leaks urine so often.     LMP: No LMP recorded.     Past Medical History:   Diagnosis Date    Bulging lumbar disc     Bulging of cervical intervertebral disc     Diabetes mellitus, type 2     Hyperlipidemia     Hypertension     Obese     Prediabetes     Vitamin D deficiency      Past Surgical History:   Procedure Laterality Date    COLONOSCOPY N/A 2021    Procedure: COLONOSCOPY;  Surgeon: Abhishek Sanderson MD;  Location: [facility];  Service: Endoscopy;  Laterality: N/A;    TUBAL LIGATION      WISDOM TOOTH EXTRACTION       Current Medications[1]    Review of patient's allergies indicates:  No Known Allergies     FH:   Breast cancer: none  Colon cancer: none  Ovarian cancer: none  Uterine cancer: none    HPV vaccine: 0/3 doses  COVID vaccine: x3 doses  -     Last pap smear:   NILM / HPV -  History of abnormal pap smears: Never  Colonoscopy:   - WNL  DEXA: Never  Mammogram:  - per pt  STD history: Trichomonas (remote) was treated  Birth control: BTL /   OB history:    Tobacco use: Former smoker    ROS:  GENERAL: Feeling well overall. Denies fever or chills.   SKIN:  Denies rash or lesions.   HEAD: Denies head injury or headache.   NODES: Denies enlarged lymph nodes.   CHEST: Denies chest pain or shortness of breath.   CARDIOVASCULAR: Denies palpitations or left sided chest pain.   ABDOMEN: No abdominal pain, constipation, diarrhea, nausea, vomiting or rectal bleeding.   URINARY: No dysuria, hematuria, or burning on urination.  REPRODUCTIVE: See HPI.   BREASTS: Denies pain, lumps, or nipple discharge.   HEMATOLOGIC: No easy bruisability or excessive bleeding.   MUSCULOSKELETAL: Denies joint pain or swelling.   NEUROLOGIC: Denies syncope or weakness.   PSYCHIATRIC: Denies depression, anxiety or mood swings.    PE: Female chaperone present for exam  APPEARANCE: Well nourished, well developed, female in no acute distress.  NODES: no cervical, supraclavicular, or inguinal lymphadenopathy  BREASTS: Symmetrical, no skin changes or visible lesions. No palpable masses, nipple discharge or adenopathy bilaterally.  ABDOMEN: Soft. No tenderness or masses. No distention. No hernias palpated.   VULVA: No lesions. Normal external female genitalia.  LABIA:         RIGHT: No rash, tenderness or lesion.         LEFT: No rash, tenderness or lesion.   URETHRA: No masses, tenderness, or prolapse.  VAGINA: Normal. No vaginal discharge, tenderness or bleeding.   UTERUS:  Normal in size/position. Non-tender and mobile on exam.   CERVIX: Moist. No rashes/lesions, abnormal discharge. Non-tender on exam.  ADNEXA: Normal in size. No masses tenderness on palpation.  ANUS/PERINEUM: Normal. No rashes, lesions on exam.    Diagnosis:  1. Well woman exam with routine gynecological exam    2. Cervical cancer screening    3. Vaginal dryness    4. Bladder leak    5. Exposure to sexually transmitted disease (STD)      Plan:   Orders Placed This Encounter    Hepatitis B Surface Antigen    Hepatitis C Antibody    HIV 1/2 Ag/Ab (4th Gen)    Treponema Pallidium Antibodies IgG, IgM    C. trachomatis/N. gonorrhoeae by  AMP DNA [facility]; Cervicovaginal    Ambulatory referral/consult to Pelvic Medicine    Liquid-Based Pap Smear, Screening    estradioL (ESTRACE) 0.01 % (0.1 mg/gram) vaginal cream     - Pap w/ HPV cotest collected.  - GC CT collected.  - STD blood work ordered (see above).  - Estrace intravaginal cream ordered. Start using nightly for 2 weeks then every 3rd night with vaginal applicator.   - Referral to pelvic floor therapy for bladder leaks in conjunction with vaginal estrogen use.      Patient was counseled today on the new ACS guidelines for cervical cytology screening as well as the current recommendations for breast cancer screening. She was counseled to follow up with her PCP for other routine health maintenance. Counseling session lasted approximately 10 minutes, and all her questions were answered.  For women over the age of 65, you can stop having cervical cancer screenings if you have never had abnormal cervical cells or cervical cancer, and youve had three negative Pap tests in a row. (You also can stop screening if youve had two negative Pap and HPV tests in a row in the past 10 years, with at least one test in the past 5 years.)    Follow-up with me in 1 year for routine well woman exam; pap in 3 years.        Macy Cope, NP-C [facility]          [1]   Current Outpatient Medications   Medication Sig Dispense Refill    amLODIPine (NORVASC) 5 MG tablet Take 1 tablet (5 mg total) by mouth once daily. 90 tablet 3    atorvastatin (LIPITOR) 80 MG tablet Take 1 tablet (80 mg total) by mouth once daily. 90 tablet 3    BLOOD PRESSURE CUFF Misc 1 Device by Misc.(Non-Drug; Combo Route) route as needed (to check blood pressure). 1 each 0    cholecalciferol, vitamin D3, 1,250 mcg (50,000 unit) capsule Take 1 capsule (50,000 Units total) by mouth every 7 days. 12 capsule 2    ibuprofen (ADVIL,MOTRIN) 600 MG tablet Take 1 tablet (600 mg total) by mouth every 6 (six) hours as needed for Pain. 20 tablet 0     linaCLOtide (LINZESS) 145 mcg Cap capsule Take 1 capsule (145 mcg total) by mouth before breakfast. 90 capsule 1    metFORMIN (GLUCOPHAGE-XR) 500 MG ER 24hr tablet TAKE 1 TABLET BY MOUTH TWICE DAILY WITH MEALS 180 tablet 1    carvediloL (COREG) 3.125 MG tablet TAKE 1 TABLET BY MOUTH TWICE DAILY WITH MEALS (Patient not taking: Reported on 5/16/2025) 180 tablet 3    estradioL (ESTRACE) 0.01 % (0.1 mg/gram) vaginal cream Place 1 g vaginally once daily. 42.5 g 6    irbesartan (AVAPRO) 75 MG tablet Take 1 tablet (75 mg total) by mouth every evening. (Patient not taking: Reported on 5/16/2025) 90 tablet 1    lifitegrast (XIIDRA) 5 % Dpet Apply 1 drop to eye every 12 (twelve) hours. (Patient not taking: Reported on 5/16/2025) 180 each 4     No current facility-administered medications for this visit.

## 2025-05-21 ENCOUNTER — RESULTS FOLLOW-UP (OUTPATIENT)
Dept: OBSTETRICS AND GYNECOLOGY | Facility: CLINIC | Age: 60
End: 2025-05-21

## 2025-07-07 DIAGNOSIS — N89.8 VAGINAL DRYNESS: ICD-10-CM

## 2025-07-07 DIAGNOSIS — R32 BLADDER LEAK: Primary | ICD-10-CM

## 2025-08-14 DIAGNOSIS — E55.9 VITAMIN D DEFICIENCY: ICD-10-CM

## 2025-08-14 RX ORDER — ASPIRIN 325 MG
50000 TABLET, DELAYED RELEASE (ENTERIC COATED) ORAL
Qty: 12 CAPSULE | Refills: 0 | Status: SHIPPED | OUTPATIENT
Start: 2025-08-14